# Patient Record
Sex: FEMALE | Race: WHITE | NOT HISPANIC OR LATINO | Employment: UNEMPLOYED | URBAN - METROPOLITAN AREA
[De-identification: names, ages, dates, MRNs, and addresses within clinical notes are randomized per-mention and may not be internally consistent; named-entity substitution may affect disease eponyms.]

---

## 2017-03-03 ENCOUNTER — TELEPHONE (OUTPATIENT)
Dept: OBSTETRICS AND GYNECOLOGY | Facility: CLINIC | Age: 27
End: 2017-03-03

## 2017-03-03 NOTE — TELEPHONE ENCOUNTER
----- Message from Magnolia Wong sent at 3/3/2017  1:10 PM CST -----  Contact: patient  Calling concerning if the hold is lifted on the insurance. Please call patient @ [phone]. Thanks, naseem

## 2017-03-08 ENCOUNTER — INITIAL PRENATAL (OUTPATIENT)
Dept: OBSTETRICS AND GYNECOLOGY | Facility: CLINIC | Age: 27
End: 2017-03-08
Payer: MEDICAID

## 2017-03-08 VITALS
BODY MASS INDEX: 28.93 KG/M2 | SYSTOLIC BLOOD PRESSURE: 114 MMHG | WEIGHT: 190.25 LBS | DIASTOLIC BLOOD PRESSURE: 74 MMHG

## 2017-03-08 DIAGNOSIS — Z98.891 HISTORY OF CESAREAN SECTION: ICD-10-CM

## 2017-03-08 DIAGNOSIS — B15.9 VIRAL HEPATITIS A WITHOUT HEPATIC COMA: ICD-10-CM

## 2017-03-08 DIAGNOSIS — Z36.3 ENCOUNTER FOR ROUTINE SCREENING FOR MALFORMATION USING ULTRASONICS: ICD-10-CM

## 2017-03-08 DIAGNOSIS — B19.10 HEPATITIS B AFFECTING PREGNANCY: ICD-10-CM

## 2017-03-08 DIAGNOSIS — B17.10 ACUTE HEPATITIS C VIRUS INFECTION WITHOUT HEPATIC COMA: ICD-10-CM

## 2017-03-08 DIAGNOSIS — O98.419 HEPATITIS B AFFECTING PREGNANCY: ICD-10-CM

## 2017-03-08 DIAGNOSIS — Z32.00 ENCOUNTER FOR CONFIRMATION OF PREGNANCY TEST RESULT WITH PHYSICAL EXAMINATION: Primary | ICD-10-CM

## 2017-03-08 PROCEDURE — 87086 URINE CULTURE/COLONY COUNT: CPT

## 2017-03-08 PROCEDURE — 99204 OFFICE O/P NEW MOD 45 MIN: CPT | Mod: TH,S$PBB,, | Performed by: ADVANCED PRACTICE MIDWIFE

## 2017-03-08 PROCEDURE — 87591 N.GONORRHOEAE DNA AMP PROB: CPT

## 2017-03-08 PROCEDURE — 99999 PR PBB SHADOW E&M-EST. PATIENT-LVL III: CPT | Mod: PBBFAC,,, | Performed by: ADVANCED PRACTICE MIDWIFE

## 2017-03-08 PROCEDURE — 99213 OFFICE O/P EST LOW 20 MIN: CPT | Mod: PBBFAC | Performed by: ADVANCED PRACTICE MIDWIFE

## 2017-03-08 NOTE — MR AVS SNAPSHOT
[facility]  [address]  [place] [address]  Phone: [phone]  Fax: [phone]                  Radha Vanegas   3/8/2017 3:15 PM   Initial Prenatal    Description:  Female : 1990   Provider:  Rita Pagan CNM   Department:  [facility]           Reason for Visit     Initial Prenatal Visit           Diagnoses this Visit        Comments    Encounter for confirmation of pregnancy test result with physical examination    -  Primary            To Do List           Future Appointments        Provider Department Dept Phone    3/14/2017 3:00 PM ULTRASOUND, OB-GYN [facility]/ -[phone]    3/14/2017 3:30 PM Rita Pagan CNM [facility]/ -[phone]    3/14/2017 4:10 PM LABORATORY, [address] [facility] [phone]      Goals (5 Years of Data)     None      [facility]     [facility] Nurse Care Line -  Assistance  Registered nurses in the [facility] provide clinical advisement, health education, appointment booking, and other advisory services.  Call for this free service at [phone].             Medications           Message regarding Medications     Verify the changes and/or additions to your medication regime listed below are the same as discussed with your clinician today.  If any of these changes or additions are incorrect, please notify your healthcare provider.             Verify that the below list of medications is an accurate representation of the medications you are currently taking.  If none reported, the list may be blank. If incorrect, please contact your healthcare provider. Carry this list with you in case of emergency.           Current Medications     lamotrigine (LAMICTAL) 25 MG tablet Take 1 tablet (25 mg total) by mouth once daily.    norgestimate-ethinyl estradiol (ORTHO TRI-CYCLEN,TRI-SPRINTEC) 0.18/0.215/0.25 mg-35 mcg (28) tablet Take 1 tablet by mouth once daily.     paroxetine (PAXIL) 20 MG tablet Take 1 tablet (20 mg total) by mouth every morning.    quetiapine (SEROQUEL) 50 MG tablet Take 1 tablet (50 mg total) by mouth every evening.    tramadol (ULTRAM) 50 mg tablet Take 1 tablet (50 mg total) by mouth every 6 (six) hours as needed for Pain.           Clinical Reference Information           Prenatal Vitals     Enc. Date GA Prenatal Vitals Prenatal Pulse Pain Level Urine Albumin/Glucose Edema Presentation Dilation/Effacement/Station    3/8/17  114/74 / 86.3 kg (190 lb 4.1 oz)  / 131 / Present  0          Your Vitals Were     BP Weight Last Period BMI       114/74 86.3 kg (190 lb 4.1 oz) 10/11/2016 (Approximate) 28.93 kg/m2       Allergies as of 3/8/2017     No Known Allergies      Immunizations Administered on Date of Encounter - 3/8/2017     None      Orders Placed During Today's Visit      Normal Orders This Visit    C. trachomatis/N. gonorrhoeae by AMP DNA Cervix     Toxicology screen, urine     Urine culture     Future Labs/Procedures Expected by Expires    CBC auto differential  3/8/2017 3/8/2018    Hepatitis B surface antigen  3/8/2017 3/8/2018    HIV-1 and HIV-2 antibodies  3/8/2017 3/8/2018    RPR  3/8/2017 3/8/2018    Rubella antibody, IgG  3/8/2017 3/8/2018    Type & Screen - Ob Profile  3/8/2017 3/8/2018      Language Assistance Services     ATTENTION: Language assistance services are available, free of charge. Please call [phone].      ATENCIÓN: Si habla español, tiene a montano disposición servicios gratuitos de asistencia lingüística. Llame al [phone].     CHÚ Ý: N?u b?n nói Ti?ng Vi?t, có các d?ch v? h? tr? ngôn ng? mi?n phí dành cho b?n. G?i s? [phone].         [facility] complies with applicable Federal civil rights laws and does not discriminate on the basis of race, color, national origin, age, disability, or sex.

## 2017-03-09 PROBLEM — O98.419 HEPATITIS B AFFECTING PREGNANCY: Status: ACTIVE | Noted: 2017-03-09

## 2017-03-09 PROBLEM — B19.10 HEPATITIS B AFFECTING PREGNANCY: Status: ACTIVE | Noted: 2017-03-09

## 2017-03-09 PROBLEM — B19.20 HEPATITIS C: Status: ACTIVE | Noted: 2017-03-09

## 2017-03-09 PROBLEM — Z98.891 HISTORY OF CESAREAN SECTION: Status: ACTIVE | Noted: 2017-03-09

## 2017-03-09 PROBLEM — B15.9 VIRAL HEPATITIS A WITHOUT HEPATIC COMA: Status: ACTIVE | Noted: 2017-03-09

## 2017-03-09 NOTE — PROGRESS NOTES
Subjective:      Radha Vanegas is being seen today for her first obstetrical visit.  This is not a planned pregnancy. She is at Unknown gestation. Her obstetrical history is significant for late to care, hx drug abuse, Hep A, B, and C.   Pregnancy history fully reviewed.    Menstrual History:  OB History      Para Term  AB TAB SAB Ectopic Multiple Living    4 2 2  1 1   0 1           Patient's last menstrual period was 10/11/2016 (approximate).       The following portions of the patient's history were reviewed and updated as appropriate: allergies, current medications, past family history, past medical history, past social history, past surgical history and problem list.    Review of Systems  A comprehensive review of systems was negative.      Objective:         General Appearance:    Alert, cooperative, no distress, appears stated age   Head:    Normocephalic, without obvious abnormality, atraumatic       Back:     Symmetric, no curvature, ROM normal, no CVA tenderness   Lungs:      respirations unlabored           Abdomen:     Soft, non-tender, bowel sounds active all four quadrants,     no masses, no organomegaly, gravid   Genitalia:   Vulva normal in appearance, no rash or lesions noted. Vagina moist, with adequate ruggae, pink in appearance, no tenderness or lesions noted, no discharge noted. Cervix closed, pink, without bleeding or discharge. Specimens collected.  Bimanual exam: no CMT, uterus palpated approx 30 wk size, ovaries not palpated. Good vaginal tone noted. Rectal exam deferred.             Skin:   Skin color, texture, turgor normal, no rashes or lesions            Assessment:      Pregnancy at 31 and 0/7 weeks      Plan:      Initial labs drawn today  Problem list reviewed and updated.  Role of ultrasound in pregnancy discussed; fetal survey: ordered.  Amniocentesis discussed: not indicated.  Follow up in 2 weeks.  75% of 20 min visit spent on counseling and coordination of  care.

## 2017-03-10 LAB
BACTERIA UR CULT: NO GROWTH
C TRACH DNA SPEC QL NAA+PROBE: NEGATIVE
N GONORRHOEA DNA SPEC QL NAA+PROBE: NEGATIVE

## 2017-03-14 ENCOUNTER — PROCEDURE VISIT (OUTPATIENT)
Dept: OBSTETRICS AND GYNECOLOGY | Facility: CLINIC | Age: 27
End: 2017-03-14
Payer: MEDICAID

## 2017-03-14 ENCOUNTER — ROUTINE PRENATAL (OUTPATIENT)
Dept: OBSTETRICS AND GYNECOLOGY | Facility: CLINIC | Age: 27
End: 2017-03-14
Payer: MEDICAID

## 2017-03-14 ENCOUNTER — LAB VISIT (OUTPATIENT)
Dept: LAB | Facility: HOSPITAL | Age: 27
End: 2017-03-14
Attending: ADVANCED PRACTICE MIDWIFE
Payer: MEDICAID

## 2017-03-14 VITALS — DIASTOLIC BLOOD PRESSURE: 60 MMHG | BODY MASS INDEX: 28.96 KG/M2 | WEIGHT: 190.5 LBS | SYSTOLIC BLOOD PRESSURE: 118 MMHG

## 2017-03-14 DIAGNOSIS — F19.11 H/O DRUG ABUSE: ICD-10-CM

## 2017-03-14 DIAGNOSIS — O98.419 HEPATITIS B AFFECTING PREGNANCY: ICD-10-CM

## 2017-03-14 DIAGNOSIS — B19.10 HEPATITIS B AFFECTING PREGNANCY: ICD-10-CM

## 2017-03-14 DIAGNOSIS — Z32.00 ENCOUNTER FOR CONFIRMATION OF PREGNANCY TEST RESULT WITH PHYSICAL EXAMINATION: ICD-10-CM

## 2017-03-14 DIAGNOSIS — Z36.3 ENCOUNTER FOR ROUTINE SCREENING FOR MALFORMATION USING ULTRASONICS: ICD-10-CM

## 2017-03-14 DIAGNOSIS — B17.10 ACUTE HEPATITIS C VIRUS INFECTION WITHOUT HEPATIC COMA: ICD-10-CM

## 2017-03-14 DIAGNOSIS — Z34.93 ENCOUNTER FOR SUPERVISION OF NORMAL PREGNANCY IN THIRD TRIMESTER, UNSPECIFIED GRAVIDITY: Primary | ICD-10-CM

## 2017-03-14 LAB
ABO + RH BLD: NORMAL
BASOPHILS # BLD AUTO: 0.01 K/UL
BASOPHILS NFR BLD: 0.1 %
BLD GP AB SCN CELLS X3 SERPL QL: NORMAL
DIFFERENTIAL METHOD: ABNORMAL
EOSINOPHIL # BLD AUTO: 0.1 K/UL
EOSINOPHIL NFR BLD: 0.7 %
ERYTHROCYTE [DISTWIDTH] IN BLOOD BY AUTOMATED COUNT: 13.4 %
HCT VFR BLD AUTO: 35.6 %
HGB BLD-MCNC: 12.2 G/DL
LYMPHOCYTES # BLD AUTO: 2 K/UL
LYMPHOCYTES NFR BLD: 22.4 %
MCH RBC QN AUTO: 31.9 PG
MCHC RBC AUTO-ENTMCNC: 34.3 %
MCV RBC AUTO: 93 FL
MONOCYTES # BLD AUTO: 0.6 K/UL
MONOCYTES NFR BLD: 6.3 %
NEUTROPHILS # BLD AUTO: 6.1 K/UL
NEUTROPHILS NFR BLD: 70 %
PLATELET # BLD AUTO: 245 K/UL
PMV BLD AUTO: 10.9 FL
RBC # BLD AUTO: 3.82 M/UL
WBC # BLD AUTO: 8.76 K/UL

## 2017-03-14 PROCEDURE — 99212 OFFICE O/P EST SF 10 MIN: CPT | Mod: PBBFAC,25 | Performed by: ADVANCED PRACTICE MIDWIFE

## 2017-03-14 PROCEDURE — 76819 FETAL BIOPHYS PROFIL W/O NST: CPT | Mod: PBBFAC | Performed by: OBSTETRICS & GYNECOLOGY

## 2017-03-14 PROCEDURE — 76819 FETAL BIOPHYS PROFIL W/O NST: CPT | Mod: 26,S$PBB,, | Performed by: OBSTETRICS & GYNECOLOGY

## 2017-03-14 PROCEDURE — 99999 PR PBB SHADOW E&M-EST. PATIENT-LVL II: CPT | Mod: PBBFAC,,, | Performed by: ADVANCED PRACTICE MIDWIFE

## 2017-03-14 PROCEDURE — 76805 OB US >/= 14 WKS SNGL FETUS: CPT | Mod: PBBFAC | Performed by: OBSTETRICS & GYNECOLOGY

## 2017-03-14 PROCEDURE — 99213 OFFICE O/P EST LOW 20 MIN: CPT | Mod: TH,S$PBB,, | Performed by: ADVANCED PRACTICE MIDWIFE

## 2017-03-14 PROCEDURE — 76805 OB US >/= 14 WKS SNGL FETUS: CPT | Mod: 26,S$PBB,, | Performed by: OBSTETRICS & GYNECOLOGY

## 2017-03-14 NOTE — PROGRESS NOTES
Pt states doing well. U/s today shows pt to be 32w2d. Appt scheduled to establish care with Dr Galloway for Hep A, B, and C.  New ob/28 week labs today. PTL talk. bg

## 2017-03-14 NOTE — MR AVS SNAPSHOT
[facility]  [address]  [place] [address]  Phone: [phone]  Fax: [phone]                  Radha Vanegas   3/14/2017 3:30 PM   Routine Prenatal    Description:  Female : 1990   Provider:  Rita Pagan CNM   Department:  [facility]           Reason for Visit     Routine Prenatal Visit                To Do List           Future Appointments        Provider Department Dept Phone    3/14/2017 3:30 PM Rita Pagan CNM [facility]/ -[phone]    3/14/2017 4:10 PM LABORATORY, [address] [facility] [phone]    3/27/2017 1:40 PM Zeb Galloway MD [facility] [phone]    3/28/2017 2:45 PM [facility] [phone]      Goals (5 Years of Data)     None      [facility]     [facility] Care [facility] -  Assistance  Registered nurses in the [facility] provide clinical advisement, health education, appointment booking, and other advisory services.  Call for this free service at [phone].             Medications           Message regarding Medications     Verify the changes and/or additions to your medication regime listed below are the same as discussed with your clinician today.  If any of these changes or additions are incorrect, please notify your healthcare provider.             Verify that the below list of medications is an accurate representation of the medications you are currently taking.  If none reported, the list may be blank. If incorrect, please contact your healthcare provider. Carry this list with you in case of emergency.           Current Medications     lamotrigine (LAMICTAL) 25 MG tablet Take 1 tablet (25 mg total) by mouth once daily.    norgestimate-ethinyl estradiol (ORTHO TRI-CYCLEN,TRI-SPRINTEC) 0.18/0.215/0.25 mg-35 mcg (28) tablet Take 1 tablet by mouth once daily.    paroxetine (PAXIL) 20 MG tablet Take 1 tablet (20 mg  total) by mouth every morning.    quetiapine (SEROQUEL) 50 MG tablet Take 1 tablet (50 mg total) by mouth every evening.    tramadol (ULTRAM) 50 mg tablet Take 1 tablet (50 mg total) by mouth every 6 (six) hours as needed for Pain.           Clinical Reference Information           Prenatal Vitals     Enc. Date GA Prenatal Vitals Prenatal Pulse Pain Level Urine Albumin/Glucose Edema Presentation Dilation/Effacement/Station    3/14/17 32w2d 118/60 / 86.4 kg (190 lb 7.6 oz)  / 152  0        3/8/17 31w3d 114/74 / 86.3 kg (190 lb 4.1 oz)  / 131 / Present  0 Negative / Negative None / None        Your Vitals Were     BP Weight Last Period BMI       118/60 86.4 kg (190 lb 7.6 oz) 10/11/2016 (Approximate) 28.96 kg/m2       Allergies as of 3/14/2017     No Known Allergies      Immunizations Administered on Date of Encounter - 3/14/2017     None      Language Assistance Services     ATTENTION: Language assistance services are available, free of charge. Please call [phone].      ATENCIÓN: Si habla jus, tiene a montano disposición servicios gratuitos de asistencia lingüística. Llame al [phone].     SEDRICK Ý: N?u b?n nói Ti?ng Vi?t, có các d?ch v? h? tr? ngôn ng? mi?n phí dành cho b?n. G?i s? [phone].         [facility] complies with applicable Federal civil rights laws and does not discriminate on the basis of race, color, national origin, age, disability, or sex.

## 2017-03-15 LAB
HAV IGM SERPL QL IA: NEGATIVE
HBSAG CONFIRMATION: POSITIVE
HBV CORE IGM SERPL QL IA: POSITIVE
HBV SURFACE AG SERPL QL IA: POSITIVE
HBV SURFACE AG SERPL QL IA: POSITIVE
HCV AB SERPL QL IA: POSITIVE
HIV 1+2 AB+HIV1 P24 AG SERPL QL IA: NEGATIVE
RPR SER QL: NORMAL
RUBV IGG SER-ACNC: 12.2 IU/ML
RUBV IGG SER-IMP: REACTIVE

## 2017-03-27 ENCOUNTER — PATIENT MESSAGE (OUTPATIENT)
Dept: GASTROENTEROLOGY | Facility: CLINIC | Age: 27
End: 2017-03-27

## 2017-03-27 ENCOUNTER — INITIAL CONSULT (OUTPATIENT)
Dept: GASTROENTEROLOGY | Facility: CLINIC | Age: 27
End: 2017-03-27
Payer: MEDICAID

## 2017-03-27 ENCOUNTER — LAB VISIT (OUTPATIENT)
Dept: LAB | Facility: HOSPITAL | Age: 27
End: 2017-03-27
Attending: INTERNAL MEDICINE
Payer: MEDICAID

## 2017-03-27 VITALS
HEART RATE: 79 BPM | DIASTOLIC BLOOD PRESSURE: 78 MMHG | BODY MASS INDEX: 29.37 KG/M2 | WEIGHT: 193.81 LBS | HEIGHT: 68 IN | SYSTOLIC BLOOD PRESSURE: 128 MMHG

## 2017-03-27 DIAGNOSIS — O98.419 HEPATITIS B SURFACE ANTIGEN POSITIVE, CURRENTLY PREGNANT: ICD-10-CM

## 2017-03-27 DIAGNOSIS — B18.1 HEPATITIS B SURFACE ANTIGEN POSITIVE, CURRENTLY PREGNANT: ICD-10-CM

## 2017-03-27 DIAGNOSIS — R76.8 HEPATITIS B CORE ANTIBODY POSITIVE: ICD-10-CM

## 2017-03-27 DIAGNOSIS — R76.8 HEPATITIS C ANTIBODY TEST POSITIVE: ICD-10-CM

## 2017-03-27 DIAGNOSIS — O98.413 HEPATITIS B AFFECTING PREGNANCY IN THIRD TRIMESTER, ANTEPARTUM: Primary | ICD-10-CM

## 2017-03-27 DIAGNOSIS — B19.10 HEPATITIS B AFFECTING PREGNANCY IN THIRD TRIMESTER, ANTEPARTUM: Primary | ICD-10-CM

## 2017-03-27 DIAGNOSIS — O98.413 HEPATITIS B AFFECTING PREGNANCY IN THIRD TRIMESTER, ANTEPARTUM: ICD-10-CM

## 2017-03-27 DIAGNOSIS — B19.10 HEPATITIS B AFFECTING PREGNANCY IN THIRD TRIMESTER, ANTEPARTUM: ICD-10-CM

## 2017-03-27 PROCEDURE — 87522 HEPATITIS C REVRS TRNSCRPJ: CPT

## 2017-03-27 PROCEDURE — 99204 OFFICE O/P NEW MOD 45 MIN: CPT | Mod: S$PBB,,, | Performed by: INTERNAL MEDICINE

## 2017-03-27 PROCEDURE — 87340 HEPATITIS B SURFACE AG IA: CPT

## 2017-03-27 PROCEDURE — 86706 HEP B SURFACE ANTIBODY: CPT

## 2017-03-27 PROCEDURE — 86704 HEP B CORE ANTIBODY TOTAL: CPT

## 2017-03-27 PROCEDURE — 87517 HEPATITIS B DNA QUANT: CPT

## 2017-03-27 PROCEDURE — 87902 NFCT AGT GNTYP ALYS HEP C: CPT

## 2017-03-27 PROCEDURE — 99999 PR PBB SHADOW E&M-EST. PATIENT-LVL III: CPT | Mod: PBBFAC,,, | Performed by: INTERNAL MEDICINE

## 2017-03-27 PROCEDURE — 36415 COLL VENOUS BLD VENIPUNCTURE: CPT

## 2017-03-27 NOTE — MR AVS SNAPSHOT
[facility]  [address]  [place] [address]  Phone: [phone]  Fax: [phone]                  Radha Vanegas   3/27/2017 1:40 PM   Initial consult    Description:  Female : 1990   Provider:  Zeb Galloway MD   [facility]           Reason for Visit     Hepatitis           Diagnoses this Visit        Comments    Hepatitis B affecting pregnancy in third trimester, antepartum    -  Primary     Hepatitis B core antibody positive         Hepatitis C antibody test positive         Hepatitis B surface antigen positive, currently pregnant                To Do List           Future Appointments        Provider Department Dept Phone    3/27/2017 4:45 PM LAB, SAME DAY [address] [facility] [phone]    2017 11:00 AM NAVEEN [place] [facility] [phone]    4/10/2017 9:40 AM MARGO Najera [facility] [phone]      Goals (5 Years of Data)     None      Follow-Up and Disposition     Return in about 3 weeks (around 2017), or if symptoms worsen or fail to improve, for To check labs and determine prophylaxis for Hep B.      [facility]     [facility] Care [facility] -  Assistance  Registered nurses in the [facility] provide clinical advisement, health education, appointment booking, and other advisory services.  Call for this free service at [phone].             Medications           Message regarding Medications     Verify the changes and/or additions to your medication regime listed below are the same as discussed with your clinician today.  If any of these changes or additions are incorrect, please notify your healthcare provider.             Verify that the below list of medications is an accurate representation of the medications you are currently taking.  If none reported, the list may be blank. If incorrect, please contact your healthcare  "provider. Carry this list with you in case of emergency.           Current Medications     lamotrigine (LAMICTAL) 25 MG tablet Take 1 tablet (25 mg total) by mouth once daily.    norgestimate-ethinyl estradiol (ORTHO TRI-CYCLEN,TRI-SPRINTEC) 0.18/0.215/0.25 mg-35 mcg (28) tablet Take 1 tablet by mouth once daily.    paroxetine (PAXIL) 20 MG tablet Take 1 tablet (20 mg total) by mouth every morning.    quetiapine (SEROQUEL) 50 MG tablet Take 1 tablet (50 mg total) by mouth every evening.    tramadol (ULTRAM) 50 mg tablet Take 1 tablet (50 mg total) by mouth every 6 (six) hours as needed for Pain.           Clinical Reference Information           Prenatal Vitals     Enc. Date GA Prenatal Vitals Prenatal Pulse Pain Level Urine Albumin/Glucose Edema Presentation Dilation/Effacement/Station    3/27/17 34w1d 128/78 / 87.9 kg (193 lb 12.6 oz)  79         3/14/17 32w2d 118/60 / 86.4 kg (190 lb 7.6 oz)  / 152  0        3/8/17 31w3d 114/74 / 86.3 kg (190 lb 4.1 oz)  / 131 / Present  0 Negative / Negative None / None        Your Vitals Were     BP Pulse Height Weight Last Period BMI    128/78 79 5' 8" (1.727 m) 87.9 kg (193 lb 12.6 oz) 10/11/2016 (Approximate) 29.46 kg/m2      Blood Pressure          Most Recent Value    BP  128/78      Allergies as of 3/27/2017     No Known Allergies      Immunizations Administered on Date of Encounter - 3/27/2017     None      Orders Placed During Today's Visit     Future Labs/Procedures Expected by Expires    Hepatitis B core antibody, total  3/27/2017 5/26/2018    Hepatitis B DNA, Quant  3/27/2017 5/26/2018    Hepatitis B surface antibody  3/27/2017 5/26/2018    Hepatitis B surface antigen  3/27/2017 5/26/2018    Hepatitis C genotype  3/27/2017 5/26/2018    Hepatitis C RNA, quantitative, PCR  3/27/2017 5/26/2018      Instructions      Treating Hepatitis B (HBV)     Keep appointments with your healthcare provider.    Acute hepatitis B symptoms usually go away. You can be treated for " hepatitis B. But there is very rarely a cure. Follow your healthcare providers instructions for follow-up to be sure the virus is gone from your blood. If you develop long-term (chronic) hepatitis B, you can help your body fight it. Your provider may tell you to follow these guidelines:  Avoid most over-the-counter pain medicines  Dont take aspirin, ibuprofen, or naproxen. If taking acetaminophen, dont take more than 2 grams a day. Always ask your provider before taking any medicines. Some can do more harm to your liver.   Don't drink alcohol  It may do more harm to your liver.  Eat a balanced diet  A diet low in fat, high in fiber, and full of fresh fruits and vegetables helps you stay healthy.  Keep your appointments  Going to checkups with your provider helps keep a close watch on your liver. If you are pregnant, your provider will check for hepatitis B. He or she will discuss treatment to reduce the chance you will pass the disease to your baby.  Get treatment if your provider tells you to  Usually, no medicine is used to treat an acute infection. But if you have chronic hepatitis B, your healthcare provider may give you medicine to treat it. The medicines are called antiviral drugs. Your provider will watch your infection to see if it getting worse. He or she will also watch to see if you have any liver damage. Sometimes the medicines must be taken for years.  Use a condom when having sex  This is to avoid passing the disease on to someone else. Also let your partner know you have hepatitis. HBV is a sexually transmitted disease. This means it can be passed on to other people through sex. It can also be transmitted through drug use by sharing needles, straws, and other drug equipment. The disease can also be prevented by getting a vaccine.      Be informed  Stay in touch with your provider. Make sure anyone you have close contact with sees a provider for testing, vaccination, and counseling. Help tell others  about how HBV is spread. The more people know about hepatitis B, the easier it is to prevent its spread.   Date Last Reviewed: 7/1/2016 © 2000-2016 [employer]. [address] [place] [address], [place] [address]. All rights reserved. This information is not intended as a substitute for professional medical care. Always follow your healthcare professional's instructions.        Hepatitis Type B    You have been diagnosed with hepatitis B, also called HBV. Hepatitis is an inflammation of the liver. In your case, it is from an infection with the hepatitis B virus.  Causes  The most common causes of hepatitis are viruses. Alcohol and drug abuse, chemical toxins, and autoimmune disorders can also cause hepatitis.  When a virus causes hepatitis, it is called viral hepatitis. The hepatitis viruses A, B, and C commonly cause viral hepatitis. Other viral infections can also cause hepatitis, such as the viruses that cause mononucleosis and chicken pox.  What all the hepatic (liver) viruses have in common is that once they are transmitted to you, they infect the liver and then cause inflammation (hepatitis). The different viruses are spread in different ways, but all of them can affect your health over a long time. Possible complications include cirrhosis, liver cancer, and liver failure.  HBV is the most common cause of acute (short-term) viral hepatitis. HBV is commonly spread through contact with the body fluids of an infected person, exposure to contaminated blood or needles, unprotected sex with an infected person, or from an infected mother to baby.  Avoiding these common routes of infection is the best way to prevent the spread of HBV.  Symptoms  Many people with HBV have either mild symptoms or no symptoms when they are first infected. Often this is also the case for many years afterwards. However, hepatitis can cause damage to your liver. It can become chronic in some people. Symptoms usually last 1 to 3 months  in the early stages. Symptoms can include:  · Tiredness, fatigue, or weakness  · Low-grade fever  · Loss of appetite  · Upset stomach, nausea, or vomiting  · Abdominal pain  · Dark-colored urine  · Light-colored or pale stool  · Yellow color of the skin or eyes (jaundice)  Home care  · A diet low in saturated fats and high in fruits and vegetables is best for you and your liver. Have small, frequent meals if you experience nausea.  · If you are having symptoms of hepatitis, you may fatigue easily. Get lots of rest. Don't exert yourself too much.  · Acetaminophen and anti-inflammatory drugs such as ibuprofen and naproxen can be toxic to the liver in high doses, with prolonged use, or in the presence of existing liver damage.  ¨ If you have hepatitis, you should not take these medicines until you talk about them with your healthcare provider.  ¨ If you have only mild or no liver damage from chronic hepatitis, you may take acetaminophen in low doses (2 grams per 24 hours). Do not take anti-inflammatory medicines. Never take acetaminophen with alcohol, since this increases the risk of liver damage.  · Alcohol stresses the liver. People with hepatitis should avoid it.  Preventing the spread of hepatitis  · This virus is present in the blood and body fluids. It can be spread through sexual contact. Inform your partner and use condoms when you have sex until the virus has been eliminated from your system. Condoms may lower the risk of spreading the virus, but they are not a guarantee  · Never share needles, syringes, tattoo equipment, snorting straws, razors, or toothbrushes.  · Do not attempt to donate blood.  · If you require medical or dental care, inform the staff that you have hepatitis so that extra care may be taken to avoid spread of the infection.  · If you are pregnant or plan to become pregnant, notify your physician. Hepatitis can be transmitted to the unborn baby.  · People who are exposed to you in any of the  ways described in the Causes section above should contact their healthcare provider or the local public health department as soon as possible. This is so they can be tested and receive immunization. A vaccine can be given up to 2 weeks after a person is exposed.  Follow-up care  Follow up with your healthcare provider, or as advised, to be sure that you are doing well and do not have any complications. It is possible to become a chronic carrier of HBV. If this happens, you could infect others even though you no longer feel ill. Only a blood test can identify this condition. This makes it important to have a follow-up exam.  If X-rays, a CT scan, or an ultrasound were done, they will be reviewed by a specialist. You will be notified of the results, especially if they affect treatment.  Call 911  Call emergency services right away if any of these occur:  · Trouble breathing or swallowing, wheezing  · Confusion  · Extreme drowsiness or trouble awakening  · Fainting or loss of consciousness  · Rapid heart rate  When to seek medical advice  Call your healthcare provider right away if any of these occur:  · Frequent vomiting  · Weight loss from poor appetite  · Increase in abdominal pain or swelling  · Increasing drowsiness or confusion  · Weakness or dizziness  · New or increasing yellow color of skin or eyes  · Bleeding from the gums or nose or easy bruising  Date Last Reviewed: 6/16/2015 © 2000-2016 [employer]. [address] [place] [address], [place] [address]. All rights reserved. This information is not intended as a substitute for professional medical care. Always follow your healthcare professional's instructions.      Hepatitis B affecting pregnancy in third trimester, antepartum  -     Hepatitis B surface antigen; Future; Expected date: 3/27/17  -     Hepatitis B surface antibody; Future; Expected date: 3/27/17  -     Hepatitis B core antibody, total; Future; Expected date: 3/27/17  -     Hepatitis B DNA,  Quant; Future; Expected date: 3/27/17    Hepatitis B core antibody positive    Hepatitis C antibody test positive  -     Hepatitis C RNA, quantitative, PCR; Future; Expected date: 3/27/17  -     Hepatitis C genotype; Future; Expected date: 3/27/17    Hepatitis B surface antigen positive, currently pregnant  -     Hepatitis B DNA, Quant; Future; Expected date: 3/27/17    Return in about 3 weeks (around 4/17/2017), or if symptoms worsen or fail to improve, for To check labs and determine prophylaxis for Hep B.    Thanks for trusting us with your healthcare needs and using [facility]. If you want to ask us a question, you can do so by replying to this message or by calling [phone].    Sincerely,    Zeb Galloway M.D.      To rate your experience with Dr. Galloway please click on the link below:    [email or web address]               Language Assistance Services     ATTENTION: Language assistance services are available, free of charge. Please call [phone].      ATENCIÓN: Si habla español, tiene a montano disposición servicios gratuitos de asistencia lingüística. Llame al [phone].     CHÚ Ý: N?u b?n nói Ti?ng Vi?t, có các d?ch v? h? tr? ngôn ng? mi?n phí dành cho b?n. G?i s? [phone].         [facility] complies with applicable Federal civil rights laws and does not discriminate on the basis of race, color, national origin, age, disability, or sex.

## 2017-03-27 NOTE — LETTER
March 27, 2017      Rita Pagan, [facility]  [address] [facility] [address]           [facility]  [address] [place] [id number]  Phone: [phone]  Fax: [phone]          Patient: Radha Vanegas   MR Number: [id number]   YOB: 1990   Date of Visit: 3/27/2017       Dear Rita Pagan:    Thank you for referring Radha Vanegas to me for evaluation. Attached you will find relevant portions of my assessment and plan of care.    If you have questions, please do not hesitate to call me. I look forward to following Radha Vanegas along with you.    Sincerely,    Zeb Galloway MD    Enclosure  CC:  No Recipients    If you would like to receive this communication electronically, please contact [email or web address] or [phone] to request more information on [employer] access.    For providers and/or their staff who would like to refer a patient to [facility], please contact us through our one-stop-shop provider referral line, [facility], at [phone].    If you feel you have received this communication in error or would no longer like to receive these types of communications, please e-mail [email or web address]

## 2017-03-27 NOTE — PROGRESS NOTES
Subjective:       Patient ID: Radha Vanegas is a 26 y.o. female.    Chief Complaint: Hepatitis    HPI Comments: Hepatitis C: Patient complains of a positive Hepatitis C antibody test. She also tested positive for Hepatitis B surface Antigen and Core antibody. Patient tested positive 4 months ago. Test was performed as part of an evaluation of abnormal liver function test:(elevated ALT, elevated AST, elevated total bilirubin) and jaundice. Hepatitis C risk factors present are IV drug abuse. Patient denies history of blood transfusion, renal dialysis. Patient has had other studies performed. Results: see labs. Patient has not had prior treatment for Hepatitis C. Patient does not have a past history of liver disease. Patient does not have a family history of liver disease.  Patient's current symptoms include fatigue. Patient denies abdominal pain, fever, nausea and weight loss. Symptoms have been present for approximately several months. The symptoms are stable.    Radha was using IV drugs until last October. She ended up in assisted for violating her probation and while in retirement she got really sick, developing itching/pruritus, nausea, vomiting and jaundice. She was diagnosed with acute hepatitis. She is also 34 weeks pregnant.     Lab Results       Component                Value               Date                       ALT                      20                  12/06/2013                 AST                      27                  12/06/2013                 ALKPHOS                  69                  12/06/2013                 BILITOT                  1.1 (H)             12/06/2013              BMP  Lab Results       Component                Value               Date                       NA                       137                 12/06/2013                 K                        3.1 (L)             12/06/2013                 CL                       105                 12/06/2013                 CO2                       19 (L)              2013                 BUN                      17                  2013                 CREATININE               0.7                 2013                 CALCIUM                  9.2                 2013                 ANIONGAP                 13                  2013                 ESTGFRAFRICA             >60                 2013                 EGFRNONAA                >60                 2013            Lab Results       Component                Value               Date                       WBC                      8.76                2017                 HGB                      12.2                2017                 HCT                      35.6 (L)            2017                 MCV                      93                  2017                 PLT                      245                 2017                  Past Medical History:   Diagnosis Date    ADD (attention deficit disorder)     Anxiety     Depression     Hepatitis     Hep A,B,C    Hypertension     Migraine headache     Panic attack      Past Surgical History:   Procedure Laterality Date    APPENDECTOMY       SECTION      x1     Current Outpatient Prescriptions on File Prior to Visit   Medication Sig Dispense Refill    lamotrigine (LAMICTAL) 25 MG tablet Take 1 tablet (25 mg total) by mouth once daily. 30 tablet 3    norgestimate-ethinyl estradiol (ORTHO TRI-CYCLEN,TRI-SPRINTEC) 0.18/0.215/0.25 mg-35 mcg (28) tablet Take 1 tablet by mouth once daily. 28 tablet 11    paroxetine (PAXIL) 20 MG tablet Take 1 tablet (20 mg total) by mouth every morning. 30 tablet 2    quetiapine (SEROQUEL) 50 MG tablet Take 1 tablet (50 mg total) by mouth every evening. 30 tablet 2    tramadol (ULTRAM) 50 mg tablet Take 1 tablet (50 mg total) by mouth every 6 (six) hours as needed for Pain. 15 tablet 0     No current facility-administered  medications on file prior to visit.      Review of patient's allergies indicates:  No Known Allergies    Social History     Social History    Marital status: Single     Spouse name: N/A    Number of children: N/A    Years of education: N/A     Occupational History    Not on file.     Social History Main Topics    Smoking status: Former Smoker    Smokeless tobacco: Never Used    Alcohol use No    Drug use: No    Sexual activity: Yes     Partners: Male     Other Topics Concern    Not on file     Social History Narrative    No narrative on file     Family History   Problem Relation Age of Onset    Hypertension Mother     Breast cancer Neg Hx     Ovarian cancer Neg Hx     Deep vein thrombosis Neg Hx     Stomach cancer Neg Hx     Liver disease Neg Hx     Colon cancer Neg Hx        Review of Systems   Constitutional: Negative for activity change, chills, diaphoresis, fatigue and fever.   HENT: Negative for ear pain, facial swelling, nosebleeds, rhinorrhea, sneezing, sore throat and trouble swallowing.    Eyes: Negative for photophobia, pain and visual disturbance.   Respiratory: Negative for apnea, cough, chest tightness, shortness of breath and wheezing.    Gastrointestinal: Negative for abdominal pain, blood in stool, constipation, nausea, rectal pain and vomiting.   Genitourinary: Negative for decreased urine volume, difficulty urinating, dysuria, flank pain and hematuria.   Musculoskeletal: Negative for arthralgias, back pain, gait problem, neck pain and neck stiffness.   Skin: Negative for pallor and rash.   Neurological: Negative for seizures, syncope, speech difficulty, weakness and headaches.   Hematological: Negative for adenopathy. Does not bruise/bleed easily.   Psychiatric/Behavioral: Negative for behavioral problems, confusion and hallucinations.       Objective:      Physical Exam   Constitutional: She is oriented to person, place, and time. She appears well-developed and well-nourished.    HENT:   Head: Normocephalic and atraumatic.   Eyes: EOM are normal. Pupils are equal, round, and reactive to light.   Neck: Normal range of motion. Neck supple. No thyromegaly present.   Cardiovascular: Normal rate, regular rhythm, normal heart sounds and intact distal pulses.    No murmur heard.  Pulmonary/Chest: Effort normal and breath sounds normal. No respiratory distress. She has no wheezes. She has no rales.   Abdominal: Soft. Bowel sounds are normal. She exhibits distension. She exhibits no mass. There is no tenderness.   34 weeks gestation abdomen   Musculoskeletal: Normal range of motion. She exhibits no edema or tenderness.   Lymphadenopathy:     She has no cervical adenopathy.   Neurological: She is alert and oriented to person, place, and time. She has normal reflexes. No cranial nerve deficit.   Skin: Skin is warm and dry. No erythema.   Psychiatric: She has a normal mood and affect. Her behavior is normal.   Vitals reviewed.      Assessment:       1. Hepatitis B affecting pregnancy in third trimester, antepartum    2. Hepatitis B core antibody positive    3. Hepatitis C antibody test positive    4. Hepatitis B surface antigen positive, currently pregnant        Plan:         Hepatitis B affecting pregnancy in third trimester, antepartum  -     Hepatitis B surface antigen; Future; Expected date: 3/27/17  -     Hepatitis B surface antibody; Future; Expected date: 3/27/17  -     Hepatitis B core antibody, total; Future; Expected date: 3/27/17  -     Hepatitis B DNA, Quant; Future; Expected date: 3/27/17    Hepatitis B core antibody positive    Hepatitis C antibody test positive  -     Hepatitis C RNA, quantitative, PCR; Future; Expected date: 3/27/17  -     Hepatitis C genotype; Future; Expected date: 3/27/17    Hepatitis B surface antigen positive, currently pregnant  -     Hepatitis B DNA, Quant; Future; Expected date: 3/27/17    Return in about 3 weeks (around 4/17/2017), or if symptoms worsen or  fail to improve, for To check labs and determine prophylaxis for Hep B. We need to determine if this is a chronic infection or in the process of clearing the Hep B infection. Also need to determine if Hepatitis C is active. If chronic Hepatitis B, she will need prophylaxis prior to her baby being delivered; hepatitis C can be treated after delivery.     Thanks for letting us participate in the care of your patient.    Zeb Galloway M.D.

## 2017-03-27 NOTE — PATIENT INSTRUCTIONS
Treating Hepatitis B (HBV)     Keep appointments with your healthcare provider.    Acute hepatitis B symptoms usually go away. You can be treated for hepatitis B. But there is very rarely a cure. Follow your healthcare providers instructions for follow-up to be sure the virus is gone from your blood. If you develop long-term (chronic) hepatitis B, you can help your body fight it. Your provider may tell you to follow these guidelines:  Avoid most over-the-counter pain medicines  Dont take aspirin, ibuprofen, or naproxen. If taking acetaminophen, dont take more than 2 grams a day. Always ask your provider before taking any medicines. Some can do more harm to your liver.   Don't drink alcohol  It may do more harm to your liver.  Eat a balanced diet  A diet low in fat, high in fiber, and full of fresh fruits and vegetables helps you stay healthy.  Keep your appointments  Going to checkups with your provider helps keep a close watch on your liver. If you are pregnant, your provider will check for hepatitis B. He or she will discuss treatment to reduce the chance you will pass the disease to your baby.  Get treatment if your provider tells you to  Usually, no medicine is used to treat an acute infection. But if you have chronic hepatitis B, your healthcare provider may give you medicine to treat it. The medicines are called antiviral drugs. Your provider will watch your infection to see if it getting worse. He or she will also watch to see if you have any liver damage. Sometimes the medicines must be taken for years.  Use a condom when having sex  This is to avoid passing the disease on to someone else. Also let your partner know you have hepatitis. HBV is a sexually transmitted disease. This means it can be passed on to other people through sex. It can also be transmitted through drug use by sharing needles, straws, and other drug equipment. The disease can also be prevented by getting a vaccine.      Be  informed  Stay in touch with your provider. Make sure anyone you have close contact with sees a provider for testing, vaccination, and counseling. Help tell others about how HBV is spread. The more people know about hepatitis B, the easier it is to prevent its spread.   Date Last Reviewed: 7/1/2016  © 8545-3633 [employer] [address] [place] [address], [place] [address]. All rights reserved. This information is not intended as a substitute for professional medical care. Always follow your healthcare professional's instructions.        Hepatitis Type B    You have been diagnosed with hepatitis B, also called HBV. Hepatitis is an inflammation of the liver. In your case, it is from an infection with the hepatitis B virus.  Causes  The most common causes of hepatitis are viruses. Alcohol and drug abuse, chemical toxins, and autoimmune disorders can also cause hepatitis.  When a virus causes hepatitis, it is called viral hepatitis. The hepatitis viruses A, B, and C commonly cause viral hepatitis. Other viral infections can also cause hepatitis, such as the viruses that cause mononucleosis and chicken pox.  What all the hepatic (liver) viruses have in common is that once they are transmitted to you, they infect the liver and then cause inflammation (hepatitis). The different viruses are spread in different ways, but all of them can affect your health over a long time. Possible complications include cirrhosis, liver cancer, and liver failure.  HBV is the most common cause of acute (short-term) viral hepatitis. HBV is commonly spread through contact with the body fluids of an infected person, exposure to contaminated blood or needles, unprotected sex with an infected person, or from an infected mother to baby.  Avoiding these common routes of infection is the best way to prevent the spread of HBV.  Symptoms  Many people with HBV have either mild symptoms or no symptoms when they are first infected. Often this is  also the case for many years afterwards. However, hepatitis can cause damage to your liver. It can become chronic in some people. Symptoms usually last 1 to 3 months in the early stages. Symptoms can include:  · Tiredness, fatigue, or weakness  · Low-grade fever  · Loss of appetite  · Upset stomach, nausea, or vomiting  · Abdominal pain  · Dark-colored urine  · Light-colored or pale stool  · Yellow color of the skin or eyes (jaundice)  Home care  · A diet low in saturated fats and high in fruits and vegetables is best for you and your liver. Have small, frequent meals if you experience nausea.  · If you are having symptoms of hepatitis, you may fatigue easily. Get lots of rest. Don't exert yourself too much.  · Acetaminophen and anti-inflammatory drugs such as ibuprofen and naproxen can be toxic to the liver in high doses, with prolonged use, or in the presence of existing liver damage.  ¨ If you have hepatitis, you should not take these medicines until you talk about them with your healthcare provider.  ¨ If you have only mild or no liver damage from chronic hepatitis, you may take acetaminophen in low doses (2 grams per 24 hours). Do not take anti-inflammatory medicines. Never take acetaminophen with alcohol, since this increases the risk of liver damage.  · Alcohol stresses the liver. People with hepatitis should avoid it.  Preventing the spread of hepatitis  · This virus is present in the blood and body fluids. It can be spread through sexual contact. Inform your partner and use condoms when you have sex until the virus has been eliminated from your system. Condoms may lower the risk of spreading the virus, but they are not a guarantee  · Never share needles, syringes, tattoo equipment, snorting straws, razors, or toothbrushes.  · Do not attempt to donate blood.  · If you require medical or dental care, inform the staff that you have hepatitis so that extra care may be taken to avoid spread of the  infection.  · If you are pregnant or plan to become pregnant, notify your physician. Hepatitis can be transmitted to the unborn baby.  · People who are exposed to you in any of the ways described in the Causes section above should contact their healthcare provider or the local public health department as soon as possible. This is so they can be tested and receive immunization. A vaccine can be given up to 2 weeks after a person is exposed.  Follow-up care  Follow up with your healthcare provider, or as advised, to be sure that you are doing well and do not have any complications. It is possible to become a chronic carrier of HBV. If this happens, you could infect others even though you no longer feel ill. Only a blood test can identify this condition. This makes it important to have a follow-up exam.  If X-rays, a CT scan, or an ultrasound were done, they will be reviewed by a specialist. You will be notified of the results, especially if they affect treatment.  Call 911  Call emergency services right away if any of these occur:  · Trouble breathing or swallowing, wheezing  · Confusion  · Extreme drowsiness or trouble awakening  · Fainting or loss of consciousness  · Rapid heart rate  When to seek medical advice  Call your healthcare provider right away if any of these occur:  · Frequent vomiting  · Weight loss from poor appetite  · Increase in abdominal pain or swelling  · Increasing drowsiness or confusion  · Weakness or dizziness  · New or increasing yellow color of skin or eyes  · Bleeding from the gums or nose or easy bruising  Date Last Reviewed: 6/16/2015 © 2000-2016 [employer]. [address] [place] [address], [place] [address]. All rights reserved. This information is not intended as a substitute for professional medical care. Always follow your healthcare professional's instructions.      Hepatitis B affecting pregnancy in third trimester, antepartum  -     Hepatitis B surface antigen; Future;  Expected date: 3/27/17  -     Hepatitis B surface antibody; Future; Expected date: 3/27/17  -     Hepatitis B core antibody, total; Future; Expected date: 3/27/17  -     Hepatitis B DNA, Quant; Future; Expected date: 3/27/17    Hepatitis B core antibody positive    Hepatitis C antibody test positive  -     Hepatitis C RNA, quantitative, PCR; Future; Expected date: 3/27/17  -     Hepatitis C genotype; Future; Expected date: 3/27/17    Hepatitis B surface antigen positive, currently pregnant  -     Hepatitis B DNA, Quant; Future; Expected date: 3/27/17    Return in about 3 weeks (around 4/17/2017), or if symptoms worsen or fail to improve, for To check labs and determine prophylaxis for Hep B.    Thanks for trusting us with your healthcare needs and using [facility]. If you want to ask us a question, you can do so by replying to this message or by calling [phone].    Sincerely,    Zeb Galloway M.D.      To rate your experience with Dr. Galloway please click on the link below:    [email or web address]

## 2017-03-28 LAB
HBV CORE AB SERPL QL IA: POSITIVE
HBV SURFACE AB SER-ACNC: NEGATIVE M[IU]/ML
HBV SURFACE AG SERPL QL IA: POSITIVE

## 2017-03-30 ENCOUNTER — TELEPHONE (OUTPATIENT)
Dept: GASTROENTEROLOGY | Facility: CLINIC | Age: 27
End: 2017-03-30

## 2017-03-30 NOTE — TELEPHONE ENCOUNTER
----- Message from Ce Rajput sent at 3/30/2017 10:02 AM CDT -----  Contact: Pt   States she is rtn nurses call from yesterday and can be reached at [phone]//[email or web address]

## 2017-03-31 LAB
HBV DNA SERPL NAA+PROBE-ACNC: NORMAL LOGIU/ML
HBV DNA SERPL NAA+PROBE-LOG IU: NORMAL IU/ML
HCV GENTYP SERPL NAA+PROBE: ABNORMAL
HCV QUALITATIVE RESULT: DETECTED
HCV QUANTITATIVE LOG: 5.21 LOG (10) IU/ML
HCV RNA SPEC NAA+PROBE-ACNC: ABNORMAL IU/ML

## 2017-04-01 ENCOUNTER — TELEPHONE (OUTPATIENT)
Dept: GASTROENTEROLOGY | Facility: HOSPITAL | Age: 27
End: 2017-04-01

## 2017-04-01 DIAGNOSIS — B18.2 CHRONIC HEPATITIS C WITHOUT HEPATIC COMA: Primary | ICD-10-CM

## 2017-04-01 NOTE — TELEPHONE ENCOUNTER
I called the patient and informed of the lab results.   Hepatitis B viral DNA is not detected.     Hepatitis C is detected.     She will need repeat labs including HBV DNA before delivery.     After she delivers her baby, she will need Rx for HCV.     Thanks,      Zeb Galloway

## 2017-04-07 ENCOUNTER — TELEPHONE (OUTPATIENT)
Dept: OBSTETRICS AND GYNECOLOGY | Facility: CLINIC | Age: 27
End: 2017-04-07

## 2017-04-07 ENCOUNTER — HOSPITAL ENCOUNTER (OUTPATIENT)
Facility: HOSPITAL | Age: 27
Discharge: HOME | End: 2017-04-07
Attending: OBSTETRICS & GYNECOLOGY | Admitting: OBSTETRICS & GYNECOLOGY
Payer: MEDICAID

## 2017-04-07 VITALS
DIASTOLIC BLOOD PRESSURE: 67 MMHG | BODY MASS INDEX: 28.79 KG/M2 | WEIGHT: 190 LBS | RESPIRATION RATE: 18 BRPM | TEMPERATURE: 98 F | HEIGHT: 68 IN | HEART RATE: 86 BPM | SYSTOLIC BLOOD PRESSURE: 127 MMHG

## 2017-04-07 DIAGNOSIS — O26.899 ABDOMINAL PAIN AFFECTING PREGNANCY: ICD-10-CM

## 2017-04-07 DIAGNOSIS — R10.9 ABDOMINAL PAIN AFFECTING PREGNANCY: ICD-10-CM

## 2017-04-07 LAB
BACTERIA #/AREA URNS HPF: ABNORMAL /HPF
BILIRUB UR QL STRIP: ABNORMAL
CLARITY UR: ABNORMAL
COLOR UR: YELLOW
GLUCOSE UR QL STRIP: NEGATIVE
HGB UR QL STRIP: NEGATIVE
KETONES UR QL STRIP: ABNORMAL
LEUKOCYTE ESTERASE UR QL STRIP: ABNORMAL
MICROSCOPIC COMMENT: ABNORMAL
NITRITE UR QL STRIP: NEGATIVE
PH UR STRIP: 6 [PH] (ref 5–8)
PROT UR QL STRIP: ABNORMAL
RBC #/AREA URNS HPF: 3 /HPF (ref 0–4)
SP GR UR STRIP: >=1.03 (ref 1–1.03)
SQUAMOUS #/AREA URNS HPF: 6 /HPF
URN SPEC COLLECT METH UR: ABNORMAL
UROBILINOGEN UR STRIP-ACNC: 1 EU/DL
WBC #/AREA URNS HPF: 8 /HPF (ref 0–5)

## 2017-04-07 PROCEDURE — 81000 URINALYSIS NONAUTO W/SCOPE: CPT

## 2017-04-07 PROCEDURE — 99211 OFF/OP EST MAY X REQ PHY/QHP: CPT

## 2017-04-07 PROCEDURE — 59025 FETAL NON-STRESS TEST: CPT | Mod: 26,S$PBB,, | Performed by: OBSTETRICS & GYNECOLOGY

## 2017-04-07 PROCEDURE — 99213 OFFICE O/P EST LOW 20 MIN: CPT | Mod: 25,TH,S$PBB, | Performed by: OBSTETRICS & GYNECOLOGY

## 2017-04-07 PROCEDURE — 59025 FETAL NON-STRESS TEST: CPT

## 2017-04-07 NOTE — DISCHARGE SUMMARY
[facility] -   Discharge Summary  Obstetrics - Triage      Admit Date: 2017    Discharge Date and Time  2017 2:44 PM:     Attending Physician: Vera Dorado, *     Discharge Provider: Lolly Espino    Reason for Admission: abdominal pain in pregnancy    Procedures Performed: * No surgery found *    Hospital Course (synopsis of major diagnoses, care, treatment, and services provided during the course of the hospital stay):     Radha Vanegas is a 27 y.o.  CaucasianF at 35w5d presents complaining of abdominal cramping.     This IUP is complicated by Hepatitis, hx drug abuse; late prenatal care due to incarceration.  Patient denies contractions, denies vaginal bleeding, denies LOF.   Fetal Movement: normal.     Vital Signs:   LMP 10/11/2016 (Approximate)  Breastfeeding? No      Physical Exam:   General:   in no apparent distress, well developed and well nourished, non-toxic, in no respiratory distress and acyanotic, alert, oriented times 3, afebrile and normal vitals   Cardiovascular: not examined   Respiratory: Respirations unlabored   Abdominal: FHT present and soft nontender   Extremities: no redness or tenderness in the calves or thighs, no edema     SVE: deferred    NST: reactive  TOCO: None    Labs:  Blood type: O POS  UA: dehydration noted with + ketone, dark urine    ASSESSMENT: Radha Vanegas is a 27 y.o.   at 35w5d with dehydration.    Final Diagnoses:    Principal Problem: <principal problem not specified>   Secondary Diagnoses:   Active Hospital Problems    Diagnosis  POA   No active problems to display.      Resolved Hospital Problems    Diagnosis Date Resolved POA    Abdominal pain affecting pregnancy [O26.899, R10.9] 2017 Yes       PLAN:  1. Encouraged to PO hydrate, labor precautions, FKCs, keep appt as scheduled for Tuesday  2. Discharge Condition: good  3. Discharge Disposition: Discharge to Home     Pt was given routine  pregnancy instructions including to return to triage if she had vaginal bleeding (other than spotting for the next 48 hrs), any loss of fluid, decreased fetal movement, or contractions that occur every 2-5 min lasting for 2 hours or more. Pt was also instructed to drink about 8-10 glasses of water per day. She was also given instructions to return for pre-eclampsia symptoms including: headache not relieved with tylenol, shortness of breath, changes in her vision, or pain in the right upper quadrant of her abdomen. Patient voiced understanding of all these instructions and was subsequently discharged home.    Follow Up/Patient Instructions:     Medications:  Reconciled Home Medications:   Current Discharge Medication List      CONTINUE these medications which have NOT CHANGED    Details   lamotrigine (LAMICTAL) 25 MG tablet Take 1 tablet (25 mg total) by mouth once daily.  Qty: 30 tablet, Refills: 3      paroxetine (PAXIL) 20 MG tablet Take 1 tablet (20 mg total) by mouth every morning.  Qty: 30 tablet, Refills: 2      quetiapine (SEROQUEL) 50 MG tablet Take 1 tablet (50 mg total) by mouth every evening.  Qty: 30 tablet, Refills: 2         STOP taking these medications       norgestimate-ethinyl estradiol (ORTHO TRI-CYCLEN,TRI-SPRINTEC) 0.18/0.215/0.25 mg-35 mcg (28) tablet Comments:   Reason for Stopping:         tramadol (ULTRAM) 50 mg tablet Comments:   Reason for Stopping:               Discharge Procedure Orders  Diet general       Follow-up Information     Follow up On 4/11/2017.    Why:  If symptoms worsen, As needed

## 2017-04-07 NOTE — IP AVS SNAPSHOT
[facility]  [address] [place] [address] [address] [place] [address]           Patient Discharge Instructions   Our goal is to set you up for success. This packet includes information on your condition, medications, and your home care.  It will help you care for yourself to prevent having to return to the hospital.     Please ask your nurse if you have any questions.      There are many details to remember when preparing to leave the hospital. Here is what you will need to do:    1. Take your medicine. If you are prescribed medications, review your Medication List on the following pages. You may have new medications to  at the pharmacy and others that you'll need to stop taking. Review the instructions for how and when to take your medications. Talk with your doctor or nurses if you are unsure of what to do.     2. Go to your follow-up appointments. Specific follow-up information is listed in the following pages. Your may be contacted by a nurse or clinical provider about future appointments. Be sure we have all of the phone numbers to reach you. Please contact your provider's office if you are unable to make an appointment.     3. Watch for warning signs. Your doctor or nurse will give you detailed warning signs to watch for and when to call for assistance. These instructions may also include educational information about your condition. If you experience any of warning signs to your health, call your doctor.           [facility]  Unless otherwise directed by your provider, please   contact [facility], our nurse care line   that is available for 24/7 assistance.     [phone] (toll-free)     Registered nurses in the [facility]   provide: appointment scheduling, clinical advisement, health education, and other advisory services.                  ** Verify the list of medication(s) below is accurate and up to date. Carry this with you in case of emergency. If your  medications have changed, please notify your healthcare provider.             Medication List      CONTINUE taking these medications        Additional Info                      lamotrigine 25 MG tablet   Commonly known as:  LAMICTAL   Quantity:  30 tablet   Refills:  3   Dose:  25 mg    Instructions:  Take 1 tablet (25 mg total) by mouth once daily.     Begin Date    AM    Noon    PM    Bedtime       paroxetine 20 MG tablet   Commonly known as:  PAXIL   Quantity:  30 tablet   Refills:  2   Dose:  20 mg    Instructions:  Take 1 tablet (20 mg total) by mouth every morning.     Begin Date    AM    Noon    PM    Bedtime       quetiapine 50 MG tablet   Commonly known as:  SEROQUEL   Quantity:  30 tablet   Refills:  2   Dose:  50 mg    Instructions:  Take 1 tablet (50 mg total) by mouth every evening.     Begin Date    AM    Noon    PM    Bedtime         STOP taking these medications     norgestimate-ethinyl estradiol 0.18/0.215/0.25 mg-35 mcg (28) tablet   Commonly known as:  ORTHO TRI-CYCLEN,TRI-SPRINTEC       tramadol 50 mg tablet   Commonly known as:  ULTRAM                  Please bring to all follow up appointments:    1. A copy of your discharge instructions.  2. All medicines you are currently taking in their original bottles.  3. Identification and insurance card.    Please arrive 15 minutes ahead of scheduled appointment time.    Please call 24 hours in advance if you must reschedule your appointment and/or time.        Your Scheduled Appointments     Apr 10, 2017  9:40 AM CDT   GASTROENTEROLOGY ESTABLISHED PATIENT with MARGO Najera   [facility]astroenter[facility] ([facility])    [address] [facility] [facility] [address]  [place] [address]   [phone]            Apr 11, 2017  1:30 PM CDT   Routine Prenatal Visit with NAVEEN Clements ([facility])    [address]  [facility] [id number] [phone]              Follow-up Information     Follow up On 4/11/2017.     Why:  If symptoms worsen, As needed        Discharge Instructions     Future Orders    Diet general     Questions:    Total calories:      Fat restriction, if any:      Protein restriction, if any:      Na restriction, if any:      Fluid restriction:      Additional restrictions:          Discharge Instructions        Discharge Instructions    Self Care Instructions:    Diet:  · Eat from the five basic food groups  · Fruits and proteins are good choices  · Limit fast foods and added salt/sugar  · Moderate carbonated and caffeine drinks    Hydration:  · Drink at least 8 large glasses of water a day    Kick Counts:  · After a meal, rest on your side and note the baby's movements until you have 8-10 movements in a 2 hour counting period.    · If you do not feel your baby move 8-10 times within 2 hours or you sense a change in the type or character of the baby's movement, you should come in to the hospital at once.  · Remember; your baby can sleep for 20-40 minutes at a time.      When to notify your provider:    · Vaginal bleeding like a period;  You may spot if we examined your cervix.  · If your water breaks, come to the birth center.  Note time, color and odor.  · Abdominal tenderness or pain that does not go away  · Contractions every 3 to 5 minutes for 1 to 2 hours.  True contractions move from front to back, are regular; usually get longer, stronger and closer together and do not stop if you change your position or activity.  · Any burning, urgency or frequency in relation to emptying your bladder.  · Any temperature greater than 100.4 degrees, chills, flu-like symptoms          Discharge References/Attachments     LABOR, RECOGNIZING (ENGLISH)    KICK COUNTS (ENGLISH)        Admission Information     Date & Time Provider Department CSN    2017 12:16 PM Vera Dorado MD [facility] [id number]      Care Providers     Provider Role Specialty Primary office phone    Vera SAGASTUME  Tita Dorado MD Attending Provider Obstetrics [phone]      Your Vitals Were     Last Period                   10/11/2016 (Approximate)           Recent Lab Values     No lab values to display.      Allergies as of 4/7/2017     No Known Allergies      Advance Directives     An advance directive is a document which, in the event you are no longer able to make decisions for yourself, tells your healthcare team what kind of treatment you do or do not want to receive, or who you would like to make those decisions for you.  If you do not currently have an advance directive, [facility] encourages you to create one.  For more information call:  [phone] ([phone]), [phone] ([phone]),  or log on to [email or web address].        Smoking Cessation     If you would like to quit smoking:   You may be eligible for free services if you are a [place] resident and started smoking cigarettes before September 1, 1988.  Call the Smoking Cessation Trust ([facility]) toll free at ([phone]) 719-7643 or [phone] [phone].   Call 8-013-QUIT-NOW if you do not meet the above criteria.   Contact us via email: [email or web address]   View our website for more information: [email or web address]        Language Assistance Services     ATTENTION: Language assistance services are available, free of charge. Please call [phone].      ATENCIÓN: Si habla español, tiene a montano disposición servicios gratuitos de asistencia lingüística. Llame al [phone].     CHÚ Ý: N?u b?n nói Ti?ng Vi?t, có các d?ch v? h? tr? ngôn ng? mi?n phí dành cho b?n. G?i s? [phone].         [facility] complies with applicable Federal civil rights laws and does not discriminate on the basis of race, color, national origin, age, disability, or sex.

## 2017-04-07 NOTE — TELEPHONE ENCOUNTER
----- Message from Daniela Leon sent at 4/7/2017 10:52 AM CDT -----  Contact: Patient  Radha, patient [phone], Patient is 36 weeks and having contractions. Call  transferred to POD. Please advise Thanks.

## 2017-04-07 NOTE — PROGRESS NOTES
FETAL SURVEILLANCE TESTING SUMMARY  NST    INDICATIONS:  rule out uterine contractions    Fetal doppler heart rate tracing obtained in the usual fashion with the patient in the left supine position.    OBJECTIVE RESULTS:  Baseline fetal heart rate: 135 bpm    Fetal heart variability: moderate  Fetal Heart Rate decelerations: none  Fetal Heart Rate accelerations: yes  Baseline FHR: 135 per minute  Fetal Non-stress Test: reactive    Fetal surveillance: reassuring

## 2017-04-11 ENCOUNTER — ROUTINE PRENATAL (OUTPATIENT)
Dept: OBSTETRICS AND GYNECOLOGY | Facility: CLINIC | Age: 27
End: 2017-04-11
Payer: MEDICAID

## 2017-04-11 VITALS
SYSTOLIC BLOOD PRESSURE: 120 MMHG | BODY MASS INDEX: 29.97 KG/M2 | DIASTOLIC BLOOD PRESSURE: 80 MMHG | WEIGHT: 197.06 LBS

## 2017-04-11 DIAGNOSIS — Z34.93 ENCOUNTER FOR SUPERVISION OF NORMAL PREGNANCY IN THIRD TRIMESTER, UNSPECIFIED GRAVIDITY: Primary | ICD-10-CM

## 2017-04-11 PROCEDURE — 99213 OFFICE O/P EST LOW 20 MIN: CPT | Mod: TH,S$PBB,, | Performed by: ADVANCED PRACTICE MIDWIFE

## 2017-04-11 PROCEDURE — 99212 OFFICE O/P EST SF 10 MIN: CPT | Mod: PBBFAC,PO | Performed by: ADVANCED PRACTICE MIDWIFE

## 2017-04-11 PROCEDURE — 87081 CULTURE SCREEN ONLY: CPT

## 2017-04-11 PROCEDURE — 99999 PR PBB SHADOW E&M-EST. PATIENT-LVL II: CPT | Mod: PBBFAC,,, | Performed by: ADVANCED PRACTICE MIDWIFE

## 2017-04-11 NOTE — PROGRESS NOTES
Doing well. GBS collected. All court ordered drug testing continues to be negative.  States initial visit with Dr Galloway went well. Will f/u tomorrow with him. Labor talk, kick counts. bg

## 2017-04-12 ENCOUNTER — OFFICE VISIT (OUTPATIENT)
Dept: GASTROENTEROLOGY | Facility: CLINIC | Age: 27
End: 2017-04-12
Payer: MEDICAID

## 2017-04-12 VITALS
DIASTOLIC BLOOD PRESSURE: 80 MMHG | BODY MASS INDEX: 29.4 KG/M2 | HEIGHT: 68 IN | SYSTOLIC BLOOD PRESSURE: 120 MMHG | HEART RATE: 64 BPM | WEIGHT: 194 LBS

## 2017-04-12 DIAGNOSIS — B19.10 HEPATITIS B AFFECTING PREGNANCY: Primary | ICD-10-CM

## 2017-04-12 DIAGNOSIS — O98.419 HEPATITIS B AFFECTING PREGNANCY: Primary | ICD-10-CM

## 2017-04-12 DIAGNOSIS — B18.2 CHRONIC HEPATITIS C WITHOUT HEPATIC COMA: ICD-10-CM

## 2017-04-12 PROCEDURE — 99213 OFFICE O/P EST LOW 20 MIN: CPT | Mod: PBBFAC,PO | Performed by: NURSE PRACTITIONER

## 2017-04-12 PROCEDURE — 99214 OFFICE O/P EST MOD 30 MIN: CPT | Mod: S$PBB,,, | Performed by: NURSE PRACTITIONER

## 2017-04-12 PROCEDURE — 99999 PR PBB SHADOW E&M-EST. PATIENT-LVL III: CPT | Mod: PBBFAC,,, | Performed by: NURSE PRACTITIONER

## 2017-04-12 NOTE — MR AVS SNAPSHOT
[facility]  [address] [place] [address]  Phone: [phone]  Fax: [phone]                  Radha Vanegas   2017 2:00 PM   Office Visit    Description:  Female : 1990   Provider:  MARGO Najera   Department:  [facility]           Reason for Visit     Follow-up           Diagnoses this Visit        Comments    Hepatitis B affecting pregnancy    -  Primary     Chronic hepatitis C without hepatic coma                To Do List           Future Appointments        Provider Department Dept Phone    2017 10:45 AM Rita Pagan CNM [facility] - OBGYN [phone]      Goals (5 Years of Data)     None      Follow-Up and Disposition     Return in about 4 months (around 2017).      [facility]     [facility] Nurse Care Line -  Assistance  Unless otherwise directed by your provider, please contact [facility], our nurse care line that is available for  assistance.     Registered nurses in the [facility] provide: appointment scheduling, clinical advisement, health education, and other advisory services.  Call: [phone] (toll free)               Medications           Message regarding Medications     Verify the changes and/or additions to your medication regime listed below are the same as discussed with your clinician today.  If any of these changes or additions are incorrect, please notify your healthcare provider.             Verify that the below list of medications is an accurate representation of the medications you are currently taking.  If none reported, the list may be blank. If incorrect, please contact your healthcare provider. Carry this list with you in case of emergency.           Current Medications     prenatal vit #105-iron-FA-dha 30 mg iron- 1.4 mg-300 mg Cmpk Take by mouth.           Clinical Reference Information           Prenatal Vitals     Enc. Date GA Prenatal Vitals Prenatal  "Pulse Pain Level Urine Albumin/Glucose Edema Presentation Dilation/Effacement/Station    4/12/17 36w3d 120/80 / 88 kg (194 lb 0.1 oz)  64         4/11/17 36w2d 120/80 / 89.4 kg (197 lb 1.5 oz)  / 138 / Present  0  None / None / None / No      4/7/17 35w5d Admission Dept: [facility] L&D    3/14/17 32w2d 118/60 / 86.4 kg (190 lb 7.6 oz)  / 152  0        3/8/17 31w3d 114/74 / 86.3 kg (190 lb 4.1 oz)  / 131 / Present  0 Negative / Negative None / None         Height: 5' 8" (1.727 m)       Your Vitals Were     BP Pulse Height Weight Last Period BMI    120/80 64 5' 8" (1.727 m) 88 kg (194 lb 0.1 oz) 10/11/2016 (Approximate) 29.5 kg/m2      Blood Pressure          Most Recent Value    BP  120/80      Allergies as of 4/12/2017     No Known Allergies      Immunizations Administered on Date of Encounter - 4/12/2017     None      Orders Placed During Today's Visit     Future Labs/Procedures Expected by Expires    Hepatitis A antibody, IgG  4/12/2017 6/11/2018    Hepatitis B core antibody, total  4/12/2017 6/11/2018    Hepatitis B e antibody  4/12/2017 6/11/2018    Hepatitis B e antigen  4/12/2017 6/11/2018    Hepatitis B surface antibody  4/12/2017 6/11/2018    Hepatitis B surface antigen  4/12/2017 6/11/2018    HEPATITIS B VIRAL DNA, QUANTITATIVE  4/12/2017 6/11/2018      Language Assistance Services     ATTENTION: Language assistance services are available, free of charge. Please call [phone].      ATENCIÓN: Si habla español, tiene a montano disposición servicios gratuitos de asistencia lingüística. Llame al [phone].     CHÚ Ý: N?u b?n nói Ti?ng Vi?t, có các d?ch v? h? tr? ngôn ng? mi?n phí dành cho b?n. G?i s? [phone].         [facility] complies with applicable Federal civil rights laws and does not discriminate on the basis of race, color, national origin, age, disability, or sex.        "

## 2017-04-13 NOTE — PROGRESS NOTES
Clinic Follow Up:  [facility] Follow Up Note    Reason for Follow Up:  The primary encounter diagnosis was Hepatitis B affecting pregnancy. A diagnosis of Chronic hepatitis C without hepatic coma was also pertinent to this visit.    PCP: Anika Johnston       HPI:  This is a 27 y.o. female here for follow up of the above  Pt was reviously seen by Dr. Galloway, comes today for follow up.  SHe was recently diagnosed with Hep B and Hep C.  Pt is 32 weeks pregnant.    Recent labs show a HBV viral ct of <20.  Hep B ag is positive, AB is negative.  SHe als was diagnosed with HCV, GT 1a.  Pt states that she is overall feeling well without any GI complaints.  Denies any abdominal pain.  No nausea or vomiting.  No change in bowel pattern.  No melena or hematochezia. No weight loss.  No upper GI bleeding.  No ascites or BLE.  No overt confusion.  She is due to deliver on 17.  Plans for infant per OB.    Review of Systems   Constitutional: Negative for chills, fever, malaise/fatigue and weight loss.   Respiratory: Negative for cough.    Cardiovascular: Negative for chest pain.   Gastrointestinal:        Per HPI   Musculoskeletal: Negative for myalgias.   Skin: Negative for itching and rash.   Neurological: Negative for headaches.   Psychiatric/Behavioral: The patient is not nervous/anxious.        Medical History:  Past Medical History:   Diagnosis Date    ADD (attention deficit disorder)     Anxiety     Depression     Hepatitis     Hep A,B,C    Hypertension     Migraine headache     Panic attack        Surgical History:   Past Surgical History:   Procedure Laterality Date    APPENDECTOMY       SECTION      x1       Family History:   Family History   Problem Relation Age of Onset    Hypertension Mother     Breast cancer Neg Hx     Ovarian cancer Neg Hx     Deep vein thrombosis Neg Hx     Stomach cancer Neg Hx     Liver disease Neg Hx     Colon cancer Neg Hx        Social History:  "  Social History   Substance Use Topics    Smoking status: Former Smoker    Smokeless tobacco: Never Used    Alcohol use No       Allergies: Reviewed    Home Medications:  Current Outpatient Prescriptions on File Prior to Visit   Medication Sig Dispense Refill    prenatal vit #105-iron-FA-dha 30 mg iron- 1.4 mg-300 mg Cmpk Take by mouth.       No current facility-administered medications on file prior to visit.        Physical Exam:  Vital Signs:  /80  Pulse 64  Ht 5' 8" (1.727 m)  Wt 88 kg (194 lb 0.1 oz)  LMP 10/11/2016 (Approximate)  BMI 29.5 kg/m2  Body mass index is 29.5 kg/(m^2).  Physical Exam   Constitutional: She is oriented to person, place, and time. She appears well-developed and well-nourished.   HENT:   Head: Normocephalic.   Eyes: No scleral icterus.   Neck: Normal range of motion.   Cardiovascular: Normal rate and regular rhythm.    Pulmonary/Chest: Effort normal and breath sounds normal.   Abdominal: Soft. Bowel sounds are normal. There is no tenderness.   Musculoskeletal: Normal range of motion.   Neurological: She is alert and oriented to person, place, and time.   Skin: Skin is warm and dry.   Psychiatric: She has a normal mood and affect.   Vitals reviewed.      Labs: Pertinent labs reviewed.      Assessment:  1. Hepatitis B affecting pregnancy    2. Chronic hepatitis C without hepatic coma        Recommendations:  - Case discussed with Dr. Tomlinson  - Will recheck labs 3 months and 6 months PP  - Treatment needs will be determined by those labs.  Hepatitis B affecting pregnancy  Chronic hepatitis C without hepatic coma  -     Hepatitis B e antigen; Future; Expected date: 4/12/17  -     Hepatitis B e antibody; Future; Expected date: 4/12/17  -     Hepatitis A antibody, IgG; Future; Expected date: 4/12/17  -     Hepatitis B surface antibody; Future; Expected date: 4/12/17  -     Hepatitis B surface antigen; Future; Expected date: 4/12/17  -     Hepatitis B core antibody, total; " Future; Expected date: 4/12/17  -     HEPATITIS B VIRAL DNA, QUANTITATIVE; Future; Expected date: 4/12/17        Return to Clinic:    Return in about 4 months (around 8/12/2017).

## 2017-04-15 LAB — BACTERIA SPEC AEROBE CULT: NORMAL

## 2017-04-18 ENCOUNTER — ROUTINE PRENATAL (OUTPATIENT)
Dept: OBSTETRICS AND GYNECOLOGY | Facility: CLINIC | Age: 27
End: 2017-04-18
Payer: MEDICAID

## 2017-04-18 VITALS
DIASTOLIC BLOOD PRESSURE: 80 MMHG | WEIGHT: 200.31 LBS | BODY MASS INDEX: 30.45 KG/M2 | SYSTOLIC BLOOD PRESSURE: 128 MMHG

## 2017-04-18 DIAGNOSIS — Z34.93 ENCOUNTER FOR SUPERVISION OF NORMAL PREGNANCY IN THIRD TRIMESTER, UNSPECIFIED GRAVIDITY: Primary | ICD-10-CM

## 2017-04-18 PROCEDURE — 99213 OFFICE O/P EST LOW 20 MIN: CPT | Mod: TH,S$PBB,, | Performed by: ADVANCED PRACTICE MIDWIFE

## 2017-04-18 PROCEDURE — 99999 PR PBB SHADOW E&M-EST. PATIENT-LVL II: CPT | Mod: PBBFAC,,, | Performed by: ADVANCED PRACTICE MIDWIFE

## 2017-04-18 PROCEDURE — 99212 OFFICE O/P EST SF 10 MIN: CPT | Mod: PBBFAC,PO | Performed by: ADVANCED PRACTICE MIDWIFE

## 2017-04-25 ENCOUNTER — ANESTHESIA EVENT (OUTPATIENT)
Dept: OBSTETRICS AND GYNECOLOGY | Facility: HOSPITAL | Age: 27
End: 2017-04-25
Payer: MEDICAID

## 2017-04-25 ENCOUNTER — PROCEDURE VISIT (OUTPATIENT)
Dept: OBSTETRICS AND GYNECOLOGY | Facility: CLINIC | Age: 27
End: 2017-04-25
Payer: MEDICAID

## 2017-04-25 ENCOUNTER — ANESTHESIA (OUTPATIENT)
Dept: OBSTETRICS AND GYNECOLOGY | Facility: HOSPITAL | Age: 27
End: 2017-04-25
Payer: MEDICAID

## 2017-04-25 ENCOUNTER — ROUTINE PRENATAL (OUTPATIENT)
Dept: OBSTETRICS AND GYNECOLOGY | Facility: CLINIC | Age: 27
End: 2017-04-25
Payer: MEDICAID

## 2017-04-25 ENCOUNTER — HOSPITAL ENCOUNTER (INPATIENT)
Facility: HOSPITAL | Age: 27
LOS: 3 days | Discharge: HOME | End: 2017-04-28
Attending: OBSTETRICS & GYNECOLOGY | Admitting: OBSTETRICS & GYNECOLOGY
Payer: MEDICAID

## 2017-04-25 ENCOUNTER — SURGERY (OUTPATIENT)
Age: 27
End: 2017-04-25

## 2017-04-25 VITALS
BODY MASS INDEX: 31.64 KG/M2 | SYSTOLIC BLOOD PRESSURE: 140 MMHG | DIASTOLIC BLOOD PRESSURE: 90 MMHG | WEIGHT: 208.13 LBS

## 2017-04-25 DIAGNOSIS — O36.5990 PREGNANCY AFFECTED BY FETAL GROWTH RESTRICTION: ICD-10-CM

## 2017-04-25 DIAGNOSIS — O14.00: ICD-10-CM

## 2017-04-25 DIAGNOSIS — Z3A.38 38 WEEKS GESTATION OF PREGNANCY: ICD-10-CM

## 2017-04-25 DIAGNOSIS — R03.0 ELEVATED BP WITHOUT DIAGNOSIS OF HYPERTENSION: ICD-10-CM

## 2017-04-25 DIAGNOSIS — Z34.93 ENCOUNTER FOR SUPERVISION OF NORMAL PREGNANCY IN THIRD TRIMESTER, UNSPECIFIED GRAVIDITY: Primary | ICD-10-CM

## 2017-04-25 DIAGNOSIS — Z98.891 STATUS POST REPEAT LOW TRANSVERSE CESAREAN SECTION: Primary | ICD-10-CM

## 2017-04-25 LAB
ABO + RH BLD: NORMAL
ALBUMIN SERPL BCP-MCNC: 2.4 G/DL
ALP SERPL-CCNC: 182 U/L
ALT SERPL W/O P-5'-P-CCNC: 51 U/L
AMPHET+METHAMPHET UR QL: NEGATIVE
ANION GAP SERPL CALC-SCNC: 10 MMOL/L
AST SERPL-CCNC: 40 U/L
BARBITURATES UR QL SCN>200 NG/ML: NEGATIVE
BASOPHILS # BLD AUTO: 0.02 K/UL
BASOPHILS NFR BLD: 0.2 %
BENZODIAZ UR QL SCN>200 NG/ML: NEGATIVE
BILIRUB SERPL-MCNC: 0.3 MG/DL
BLD GP AB SCN CELLS X3 SERPL QL: NORMAL
BUN SERPL-MCNC: 13 MG/DL
BZE UR QL SCN: NEGATIVE
CALCIUM SERPL-MCNC: 8.3 MG/DL
CANNABINOIDS UR QL SCN: NEGATIVE
CHLORIDE SERPL-SCNC: 112 MMOL/L
CO2 SERPL-SCNC: 19 MMOL/L
CREAT SERPL-MCNC: 0.7 MG/DL
CREAT UR-MCNC: 267.9 MG/DL
CREAT UR-MCNC: 267.9 MG/DL
DIFFERENTIAL METHOD: ABNORMAL
EOSINOPHIL # BLD AUTO: 0.1 K/UL
EOSINOPHIL NFR BLD: 0.7 %
ERYTHROCYTE [DISTWIDTH] IN BLOOD BY AUTOMATED COUNT: 12.9 %
EST. GFR  (AFRICAN AMERICAN): >60 ML/MIN/1.73 M^2
EST. GFR  (NON AFRICAN AMERICAN): >60 ML/MIN/1.73 M^2
GLUCOSE SERPL-MCNC: 71 MG/DL
HCT VFR BLD AUTO: 33.1 %
HGB BLD-MCNC: 11.5 G/DL
LYMPHOCYTES # BLD AUTO: 2 K/UL
LYMPHOCYTES NFR BLD: 24.8 %
MCH RBC QN AUTO: 31.3 PG
MCHC RBC AUTO-ENTMCNC: 34.7 %
MCV RBC AUTO: 90 FL
METHADONE UR QL SCN>300 NG/ML: NEGATIVE
MONOCYTES # BLD AUTO: 0.5 K/UL
MONOCYTES NFR BLD: 5.7 %
NEUTROPHILS # BLD AUTO: 5.5 K/UL
NEUTROPHILS NFR BLD: 68.6 %
OPIATES UR QL SCN: NEGATIVE
PCP UR QL SCN>25 NG/ML: NEGATIVE
PLATELET # BLD AUTO: 160 K/UL
PMV BLD AUTO: 12.8 FL
POTASSIUM SERPL-SCNC: 3.9 MMOL/L
PROT SERPL-MCNC: 6.1 G/DL
PROT UR-MCNC: 34 MG/DL
PROT/CREAT RATIO, UR: 0.13
RBC # BLD AUTO: 3.67 M/UL
SODIUM SERPL-SCNC: 141 MMOL/L
TOXICOLOGY INFORMATION: NORMAL
WBC # BLD AUTO: 8.05 K/UL

## 2017-04-25 PROCEDURE — 37000009 HC ANESTHESIA EA ADD 15 MINS: Performed by: OBSTETRICS & GYNECOLOGY

## 2017-04-25 PROCEDURE — 59025 FETAL NON-STRESS TEST: CPT

## 2017-04-25 PROCEDURE — 86920 COMPATIBILITY TEST SPIN: CPT

## 2017-04-25 PROCEDURE — 25000003 PHARM REV CODE 250: Performed by: NURSE ANESTHETIST, CERTIFIED REGISTERED

## 2017-04-25 PROCEDURE — 99499 UNLISTED E&M SERVICE: CPT | Mod: S$PBB,,, | Performed by: ADVANCED PRACTICE MIDWIFE

## 2017-04-25 PROCEDURE — 86900 BLOOD TYPING SEROLOGIC ABO: CPT

## 2017-04-25 PROCEDURE — 99999 PR PBB SHADOW E&M-EST. PATIENT-LVL II: CPT | Mod: PBBFAC,,, | Performed by: ADVANCED PRACTICE MIDWIFE

## 2017-04-25 PROCEDURE — 76820 UMBILICAL ARTERY ECHO: CPT | Mod: 26,S$PBB,, | Performed by: OBSTETRICS & GYNECOLOGY

## 2017-04-25 PROCEDURE — 80053 COMPREHEN METABOLIC PANEL: CPT | Mod: 91

## 2017-04-25 PROCEDURE — 63600175 PHARM REV CODE 636 W HCPCS: Performed by: NURSE ANESTHETIST, CERTIFIED REGISTERED

## 2017-04-25 PROCEDURE — 25000003 PHARM REV CODE 250: Performed by: ADVANCED PRACTICE MIDWIFE

## 2017-04-25 PROCEDURE — 84156 ASSAY OF PROTEIN URINE: CPT

## 2017-04-25 PROCEDURE — 36000685 HC CESAREAN SECTION LEVEL I

## 2017-04-25 PROCEDURE — 63600175 PHARM REV CODE 636 W HCPCS: Performed by: OBSTETRICS & GYNECOLOGY

## 2017-04-25 PROCEDURE — 76819 FETAL BIOPHYS PROFIL W/O NST: CPT | Mod: 26,S$PBB,, | Performed by: OBSTETRICS & GYNECOLOGY

## 2017-04-25 PROCEDURE — 85025 COMPLETE CBC W/AUTO DIFF WBC: CPT | Mod: 91

## 2017-04-25 PROCEDURE — 25000003 PHARM REV CODE 250: Performed by: OBSTETRICS & GYNECOLOGY

## 2017-04-25 PROCEDURE — 86901 BLOOD TYPING SEROLOGIC RH(D): CPT

## 2017-04-25 PROCEDURE — 51702 INSERT TEMP BLADDER CATH: CPT

## 2017-04-25 PROCEDURE — 11000001 HC ACUTE MED/SURG PRIVATE ROOM

## 2017-04-25 PROCEDURE — 99211 OFF/OP EST MAY X REQ PHY/QHP: CPT | Mod: 27

## 2017-04-25 PROCEDURE — 59514 CESAREAN DELIVERY ONLY: CPT | Mod: AT,,, | Performed by: OBSTETRICS & GYNECOLOGY

## 2017-04-25 PROCEDURE — 80307 DRUG TEST PRSMV CHEM ANLYZR: CPT

## 2017-04-25 PROCEDURE — 37000008 HC ANESTHESIA 1ST 15 MINUTES: Performed by: OBSTETRICS & GYNECOLOGY

## 2017-04-25 RX ORDER — SODIUM CITRATE AND CITRIC ACID MONOHYDRATE 334; 500 MG/5ML; MG/5ML
SOLUTION ORAL
Status: DISCONTINUED | OUTPATIENT
Start: 2017-04-25 | End: 2017-04-25

## 2017-04-25 RX ORDER — PROPOFOL 10 MG/ML
VIAL (ML) INTRAVENOUS
Status: DISCONTINUED | OUTPATIENT
Start: 2017-04-25 | End: 2017-04-25

## 2017-04-25 RX ORDER — SODIUM CHLORIDE, SODIUM LACTATE, POTASSIUM CHLORIDE, CALCIUM CHLORIDE 600; 310; 30; 20 MG/100ML; MG/100ML; MG/100ML; MG/100ML
INJECTION, SOLUTION INTRAVENOUS CONTINUOUS
Status: DISCONTINUED | OUTPATIENT
Start: 2017-04-25 | End: 2017-04-28 | Stop reason: HOSPADM

## 2017-04-25 RX ORDER — ONDANSETRON 8 MG/1
8 TABLET, ORALLY DISINTEGRATING ORAL EVERY 8 HOURS PRN
Status: DISCONTINUED | OUTPATIENT
Start: 2017-04-25 | End: 2017-04-25

## 2017-04-25 RX ORDER — AMOXICILLIN 250 MG
1 CAPSULE ORAL NIGHTLY PRN
Status: DISCONTINUED | OUTPATIENT
Start: 2017-04-25 | End: 2017-04-28 | Stop reason: HOSPADM

## 2017-04-25 RX ORDER — PHENYLEPHRINE HYDROCHLORIDE 10 MG/ML
INJECTION INTRAVENOUS
Status: DISCONTINUED | OUTPATIENT
Start: 2017-04-25 | End: 2017-04-25

## 2017-04-25 RX ORDER — ONDANSETRON 2 MG/ML
INJECTION INTRAMUSCULAR; INTRAVENOUS
Status: DISCONTINUED | OUTPATIENT
Start: 2017-04-25 | End: 2017-04-25

## 2017-04-25 RX ORDER — ACETAMINOPHEN 325 MG/1
650 TABLET ORAL EVERY 6 HOURS PRN
Status: DISCONTINUED | OUTPATIENT
Start: 2017-04-25 | End: 2017-04-28 | Stop reason: HOSPADM

## 2017-04-25 RX ORDER — OXYCODONE AND ACETAMINOPHEN 10; 325 MG/1; MG/1
1 TABLET ORAL EVERY 4 HOURS PRN
Status: DISCONTINUED | OUTPATIENT
Start: 2017-04-25 | End: 2017-04-28 | Stop reason: HOSPADM

## 2017-04-25 RX ORDER — DIPHENHYDRAMINE HYDROCHLORIDE 50 MG/ML
25 INJECTION INTRAMUSCULAR; INTRAVENOUS EVERY 4 HOURS PRN
Status: DISCONTINUED | OUTPATIENT
Start: 2017-04-25 | End: 2017-04-28 | Stop reason: HOSPADM

## 2017-04-25 RX ORDER — OXYCODONE AND ACETAMINOPHEN 5; 325 MG/1; MG/1
1 TABLET ORAL EVERY 4 HOURS PRN
Status: DISCONTINUED | OUTPATIENT
Start: 2017-04-25 | End: 2017-04-28 | Stop reason: HOSPADM

## 2017-04-25 RX ORDER — OXYTOCIN/RINGER'S LACTATE 20/1000 ML
PLASTIC BAG, INJECTION (ML) INTRAVENOUS CONTINUOUS PRN
Status: DISCONTINUED | OUTPATIENT
Start: 2017-04-25 | End: 2017-04-25

## 2017-04-25 RX ORDER — LIDOCAINE HCL/PF 100 MG/5ML
SYRINGE (ML) INTRAVENOUS
Status: DISCONTINUED | OUTPATIENT
Start: 2017-04-25 | End: 2017-04-25

## 2017-04-25 RX ORDER — MISOPROSTOL 200 UG/1
800 TABLET ORAL
Status: DISCONTINUED | OUTPATIENT
Start: 2017-04-25 | End: 2017-04-28 | Stop reason: HOSPADM

## 2017-04-25 RX ORDER — ONDANSETRON 8 MG/1
8 TABLET, ORALLY DISINTEGRATING ORAL EVERY 8 HOURS PRN
Status: DISCONTINUED | OUTPATIENT
Start: 2017-04-25 | End: 2017-04-28 | Stop reason: HOSPADM

## 2017-04-25 RX ORDER — BISACODYL 10 MG
10 SUPPOSITORY, RECTAL RECTAL ONCE AS NEEDED
Status: ACTIVE | OUTPATIENT
Start: 2017-04-25 | End: 2017-04-25

## 2017-04-25 RX ORDER — DIPHENHYDRAMINE HCL 25 MG
25 CAPSULE ORAL EVERY 4 HOURS PRN
Status: DISCONTINUED | OUTPATIENT
Start: 2017-04-25 | End: 2017-04-28 | Stop reason: HOSPADM

## 2017-04-25 RX ORDER — MORPHINE SULFATE 1 MG/ML
INJECTION, SOLUTION EPIDURAL; INTRATHECAL; INTRAVENOUS
Status: DISCONTINUED | OUTPATIENT
Start: 2017-04-25 | End: 2017-04-25

## 2017-04-25 RX ORDER — KETOROLAC TROMETHAMINE 30 MG/ML
INJECTION, SOLUTION INTRAMUSCULAR; INTRAVENOUS
Status: DISCONTINUED | OUTPATIENT
Start: 2017-04-25 | End: 2017-04-25

## 2017-04-25 RX ORDER — ADHESIVE BANDAGE
30 BANDAGE TOPICAL EVERY 6 HOURS PRN
Status: DISCONTINUED | OUTPATIENT
Start: 2017-04-26 | End: 2017-04-28 | Stop reason: HOSPADM

## 2017-04-25 RX ORDER — OXYTOCIN/RINGER'S LACTATE 20/1000 ML
41.65 PLASTIC BAG, INJECTION (ML) INTRAVENOUS CONTINUOUS
Status: ACTIVE | OUTPATIENT
Start: 2017-04-25 | End: 2017-04-26

## 2017-04-25 RX ORDER — SIMETHICONE 80 MG
1 TABLET,CHEWABLE ORAL EVERY 6 HOURS PRN
Status: DISCONTINUED | OUTPATIENT
Start: 2017-04-25 | End: 2017-04-28 | Stop reason: HOSPADM

## 2017-04-25 RX ORDER — ACETAMINOPHEN 500 MG
500 TABLET ORAL EVERY 6 HOURS PRN
Status: DISCONTINUED | OUTPATIENT
Start: 2017-04-25 | End: 2017-04-28 | Stop reason: HOSPADM

## 2017-04-25 RX ORDER — CEFAZOLIN SODIUM 2 G/50ML
2 SOLUTION INTRAVENOUS
Status: COMPLETED | OUTPATIENT
Start: 2017-04-25 | End: 2017-04-25

## 2017-04-25 RX ORDER — ZOLPIDEM TARTRATE 5 MG/1
5 TABLET ORAL NIGHTLY PRN
Status: DISCONTINUED | OUTPATIENT
Start: 2017-04-25 | End: 2017-04-28 | Stop reason: HOSPADM

## 2017-04-25 RX ORDER — ACETAMINOPHEN 10 MG/ML
1000 INJECTION, SOLUTION INTRAVENOUS ONCE
Status: COMPLETED | OUTPATIENT
Start: 2017-04-25 | End: 2017-04-26

## 2017-04-25 RX ADMIN — SODIUM CHLORIDE, SODIUM LACTATE, POTASSIUM CHLORIDE, AND CALCIUM CHLORIDE: 600; 310; 30; 20 INJECTION, SOLUTION INTRAVENOUS at 09:04

## 2017-04-25 RX ADMIN — ONDANSETRON 4 MG: 2 INJECTION, SOLUTION INTRAMUSCULAR; INTRAVENOUS at 09:04

## 2017-04-25 RX ADMIN — SODIUM CHLORIDE, SODIUM LACTATE, POTASSIUM CHLORIDE, AND CALCIUM CHLORIDE 2000 ML: .6; .31; .03; .02 INJECTION, SOLUTION INTRAVENOUS at 08:04

## 2017-04-25 RX ADMIN — PROPOFOL 40 MG: 10 INJECTION, EMULSION INTRAVENOUS at 09:04

## 2017-04-25 RX ADMIN — SODIUM CITRATE AND CITRIC ACID MONOHYDRATE 30 ML: 500; 334 SOLUTION ORAL at 08:04

## 2017-04-25 RX ADMIN — CEFAZOLIN SODIUM 2 G: 2 SOLUTION INTRAVENOUS at 09:04

## 2017-04-25 RX ADMIN — KETOROLAC TROMETHAMINE 30 MG: 30 INJECTION, SOLUTION INTRAMUSCULAR; INTRAVENOUS at 09:04

## 2017-04-25 RX ADMIN — PROPOFOL 40 MG: 10 INJECTION, EMULSION INTRAVENOUS at 10:04

## 2017-04-25 RX ADMIN — ACETAMINOPHEN 1000 MG: 10 INJECTION, SOLUTION INTRAVENOUS at 11:04

## 2017-04-25 RX ADMIN — MORPHINE SULFATE 150 MCG: 1 INJECTION, SOLUTION EPIDURAL; INTRATHECAL; INTRAVENOUS at 09:04

## 2017-04-25 RX ADMIN — Medication 333 MILLI-UNITS/MIN: at 11:04

## 2017-04-25 RX ADMIN — Medication: at 09:04

## 2017-04-25 RX ADMIN — LIDOCAINE HYDROCHLORIDE 30 MG: 20 INJECTION, SOLUTION INTRAVENOUS at 09:04

## 2017-04-25 RX ADMIN — OXYCODONE HYDROCHLORIDE AND ACETAMINOPHEN 1 TABLET: 10; 325 TABLET ORAL at 11:04

## 2017-04-25 RX ADMIN — PHENYLEPHRINE HYDROCHLORIDE 50 MCG: 10 INJECTION INTRAVENOUS at 09:04

## 2017-04-25 NOTE — H&P
"[facility]   Obstetrics  History & Physical    Patient Name: Radha Vanegas  MRN: [id number]  Admission Date: 2017  Primary Care Provider: Anika Johnston MD    Subjective:     Principal Problem:<principal problem not specified>    History of Present Illness:  Sent from clinic for high blood pressure and IUGR     Obstetric HPI:  Patient reports Date/time of onset: 17 irregular contractions, active fetal movement, No vaginal bleeding , No loss of fluid     This pregnancy has been complicated by heroin use, smoking, late prenatal care, Hep A,B,& C.     Obstetric History       T2      TAB1   SAB0   E0   M0   L1       # Outcome Date GA Lbr Brandon/2nd Weight Sex Delivery Anes PTL Lv   4 Current            3 Term 14 41w2d  4.321 kg (9 lb 8.4 oz) F CS-LTranv Spinal N Y      Apgar1:  9                Apgar5: 9   2 Term 13 39w5d  3.544 kg (7 lb 13 oz) M CS-Unspec      1 TAB                 Past Medical History:   Diagnosis Date    ADD (attention deficit disorder)     Anxiety     Depression     Hepatitis     Hep A,B,C    Hypertension     Migraine headache     Panic attack      Past Surgical History:   Procedure Laterality Date    APPENDECTOMY       SECTION      x1       PTA Medications   Medication Sig    prenatal vit #105-iron-FA-dha 30 mg iron- 1.4 mg-300 mg Cmpk Take by mouth.       Review of patient's allergies indicates:  No Known Allergies     Family History     Problem Relation (Age of Onset)    Hypertension Mother        Social History Main Topics    Smoking status: Former Smoker    Smokeless tobacco: Never Used    Alcohol use No    Drug use: No    Sexual activity: Yes     Partners: Male     Review of Systems   Constitutional: Positive for unexpected weight change.        7 lb weight gain since last visit according to patient   Eyes: Positive for visual disturbance.        "floaters"   Respiratory: Negative.    Cardiovascular: Positive for leg " swelling.   Gastrointestinal: Positive for abdominal pain.        Epigastric and RUQ abd pain since 1330 today   Genitourinary: Negative.    Musculoskeletal: Negative.    Skin:  Negative.   Neurological: Positive for headaches.   Hematological: Negative.    Psychiatric/Behavioral: Negative.       Objective:     Vital Signs (Most Recent):    Vital Signs (24h Range):  BP: (140)/(90) 140/90        There is no height or weight on file to calculate BMI.    FHT: 130 bpm Cat 1 (reassuring)  TOCO: none    Physical Exam:   Constitutional: She is oriented to person, place, and time. She appears well-developed and well-nourished.       Cardiovascular: Normal rate and regular rhythm.     Pulmonary/Chest: Effort normal and breath sounds normal.        Abdominal: Soft. Bowel sounds are normal.     Genitourinary: Uterus normal.               Neurological: She is alert and oriented to person, place, and time. She displays abnormal reflex.   DTR's +3    Skin: Skin is warm and dry.    Psychiatric: She has a normal mood and affect.       SVE defferred      Significant Labs:  Lab Results   Component Value Date    GROUPTRH O POS 2017    HEPBSAG Positive (A) 2017       I have personallly reviewed all pertinent lab results from the last 24 hours.    Assessment/Plan:     27 y.o. female  at 38w2d for:    Mild preeclampsia  Serial BP's, labs, UDS for drug Hx   NST   Discussed POC with Dr. Mcgill.    If severe PreE, will plan for c section tonight.       Lolly Mishra CNM  Obstetrics  [facility]

## 2017-04-25 NOTE — PROGRESS NOTES
Doing well. Few uc's yesterday, nothing regular. BP elevated, no PIH complaints. DTR's wnl. BPP 8/8. Overall growth 14%, AC 5%. S/D ratios mildly elevated proximal to PCI. Will consult Dr Dorado (reading u/s today).  PIH precautions provided. Staying clean. Was able to get joint custody of her daughter.

## 2017-04-25 NOTE — IP AVS SNAPSHOT
[address] [place] [address]           Patient Discharge Instructions   Our goal is to set you up for success. This packet includes information on your condition, medications, and your home care.  It will help you care for yourself to prevent having to return to the hospital.     Please ask your nurse if you have any questions.      There are many details to remember when preparing to leave the hospital. Here is what you will need to do:    1. Take your medicine. If you are prescribed medications, review your Medication List on the following pages. You may have new medications to  at the pharmacy and others that you'll need to stop taking. Review the instructions for how and when to take your medications. Talk with your doctor or nurses if you are unsure of what to do.     2. Go to your follow-up appointments. Specific follow-up information is listed in the following pages. Your may be contacted by a nurse or clinical provider about future appointments. Be sure we have all of the phone numbers to reach you. Please contact your provider's office if you are unable to make an appointment.     3. Watch for warning signs. Your doctor or nurse will give you detailed warning signs to watch for and when to call for assistance. These instructions may also include educational information about your condition. If you experience any of warning signs to your health, call your doctor.               ** Verify the list of medication(s) below is accurate and up to date. Carry this with you in case of emergency. If your medications have changed, please notify your healthcare provider.             Medication List      START taking these medications        Additional Info                      ibuprofen 600 MG tablet   Commonly known as:  ADVIL,MOTRIN   Quantity:  30 tablet   Refills:  1   Dose:  600 mg    Last time this was given:  600 mg on 4/28/2017  6:00 AM   Instructions:   Take 1 tablet (600 mg total) by mouth every 6 (six) hours as needed for Pain.     Begin Date    AM    Noon    PM    Bedtime       oxycodone-acetaminophen 5-325 mg per tablet   Commonly known as:  PERCOCET   Quantity:  30 tablet   Refills:  0   Dose:  1 tablet    Instructions:  Take 1 tablet by mouth every 4 (four) hours as needed for Pain.     Begin Date    AM    Noon    PM    Bedtime         CONTINUE taking these medications        Additional Info                      prenatal vit #105-iron-FA-dha 30 mg iron- 1.4 mg-300 mg Cmpk   Refills:  0    Instructions:  Take by mouth.     Begin Date    AM    Noon    PM    Bedtime            Where to Get Your Medications      These medications were sent to [facility]/pharmacy #[id number] - [place], [place] - [address]  [address] [address] [address]     Phone:  [phone]     ibuprofen 600 MG tablet    oxycodone-acetaminophen 5-325 mg per tablet                  Please bring to all follow up appointments:    1. A copy of your discharge instructions.  2. All medicines you are currently taking in their original bottles.  3. Identification and insurance card.    Please arrive 15 minutes ahead of scheduled appointment time.    Please call 24 hours in advance if you must reschedule your appointment and/or time.        Your Scheduled Appointments     May 02, 2017 11:45 AM CDT   [facility]    [address] [id number] [phone]            Jun 05, 2017 10:00 AM CDT   Post Partum with MD Austin Alejo - OB/ GYN ([facility])    [address] [facility] [id number] [phone]            Aug 14, 2017  9:45 AM CDT   Non-Fasting Lab with [facility]    [address] [id number] [phone]            Aug 28, 2017  9:00 AM CDT   GASTROENTEROLOGY ESTABLISHED PATIENT  "with MARGO Najera   [facility] - Gastroenterology ([facility])    [address] [place] [id number] [phone]              Follow-up Information     Follow up with Cecilia Mcgill MD. Go on 6/5/2017.    Specialties:  Obstetrics, Obstetrics and Gynecology    Why:  10AM    Contact information:    [address] [place] [address] [phone]          Follow up with Rita Pagan CNM. Go on 5/2/2017.    Specialty:  Obstetrics    Why:  11:45AM.....dressing removal    Contact information:    [address]  [place] [address] [phone]          Discharge Instructions     Future Orders    Call MD for:  difficulty breathing or increased cough     Call MD for:  persistent dizziness, light-headedness, or visual disturbances     Call MD for:  persistent nausea and vomiting or diarrhea     Call MD for:  redness, tenderness, or signs of infection (pain, swelling, redness, odor or green/yellow discharge around incision site)     Call MD for:  severe persistent headache     Call MD for:  severe uncontrolled pain     Call MD for:  temperature >100.4     Diet general     Questions:    Total calories:      Fat restriction, if any:      Protein restriction, if any:      Na restriction, if any:      Fluid restriction:      Additional restrictions:      Leave dressing on - Keep it clean, dry, and intact until clinic visit     Lifting restrictions     Scheduling Instructions:    No lifting more than 15 pounds x 4 weeks.    Other restrictions (specify):     Scheduling Instructions:    Pelvic rest x 6 weeks        Discharge Instructions       Mother Self Care:    Activity: Avoid strenuous exercise and get adequate rest.  No driving until the physician consent given.  Emotional Changes: Most women find birth to be a time of great emotional upheaval.  Sense of loss, mood swings, fatigue, anxiety, and feeling "let down" are common.  If feelings worsen or last more than a week, call your physician.  Breast " Care/Breastfeeding: Wear a bra for comfort.  Keep nipples dry and apply your own breast milk or lanolin cream as needed for soreness.  Engorgement can be relieved with warm, moist heat before feedings.  You may also take Ibuprofen.  Breast Care/Bottle Feeding: Wear support bra 24 hours a day for one week.  Avoid stimulation to breasts.  You may use ice packs for discomfort.  Petr-Care/Vaginal Bleeding: Remember to use your petr-bottle after urinating.  Your flow will change from red, to pink, to yellow/white color over a period of 2 weeks.  Menstruation will return in 3-8 weeks, or longer if breastfeeding.  Episiotomy Vaginal Delivery: Stitches will dissolve within 10 days to 3 weeks.  Warm baths, tucks, and dermoplast will promote healing.  Avoid bubble baths or strong soaps.   Section/Tubal Ligation: Keep incision clean and dry.  Please remove steri-strips in 5-7 days.  You may shower, but avoid baths.  Sexual Activity/Pelvic Rest: No sexual activity, tampons, or douching until your physician gives you consent.  Diet: Continue to eat from the five basic food groups, including plenty of protein, fruits, vegetables, and whole grains.  Limit empty calories and high fat foods.  Drink enough fluids to satisfy thirst and add an extra 500 calories for breastfeeding.  Constipation/Hemorrhoids: Drink plenty of water.  You may take a stool softener or natural laxative (Metamucil). You may use tucks or hemorrhoid ointment and soak in a warm tub.    CALL YOUR OB DOCTOR IF ANY OF THE FOLLOWING OCCURS:  *Heavy bleeding - saturating a pad an hour or passing any large (2-3 inches in size) blood clots.  *Any pain, redness, or tenderness in lower leg.  *You cannot care for yourself or your baby.  *Any signs of infection-      - Temperature greater than 100.5 degrees F      - Foul smelling vaginal discharge and/or incisional drainage      - Increased episiotomy or incisional pain      - Hot, hard, red or sore area on  "breast      - Flu-like symptoms      - Any urgency, frequency or burning with urination      Primary Diagnosis     Your primary diagnosis was:  Status Post Repeat Low Transverse  Section      Admission Information     Date & Time Provider Department CSN    2017  3:47 PM Cecilia Mcgill MD [facility] [id number]      Care Providers     Provider Role Specialty Primary office phone    Cecilia Mcgill MD Attending Provider Obstetrics [phone]    Cecilia Mcgill MD Surgeon  Obstetrics [phone]      Your Vitals Were     BP Pulse Temp Resp Height Weight    124/66 69 98.2 °F (36.8 °C) (Oral) 18 5' 8" (1.727 m) 94 kg (207 lb 3.7 oz)    Last Period SpO2 BMI          10/11/2016 (Approximate) 99% 31.51 kg/m2        Recent Lab Values     No lab values to display.      Pending Labs     Order Current Status    Specimen to Pathology - Surgery In process    Prepare RBC 2 Units; Hemorrhage Preliminary result      Allergies as of 2017     No Known Allergies      [facility]     [facility] Nurse Care Line -  Assistance  Unless otherwise directed by your provider, please contact [facility], our nurse care line that is available for  assistance.     Registered nurses in the [facility] provide clinical advisement, health education, appointment booking, and other advisory services.  Call for this free service at [phone].        Advance Directives     An advance directive is a document which, in the event you are no longer able to make decisions for yourself, tells your healthcare team what kind of treatment you do or do not want to receive, or who you would like to make those decisions for you.  If you do not currently have an advance directive, [facility] encourages you to create one.  For more information call:  [phone] ([phone]), [phone] ([phone]),  or log on to [email or web address].        Smoking Cessation     If you would like to " quit smoking:   You may be eligible for free services if you are a [place] resident and started smoking cigarettes before September 1, 1988.  Call the Smoking Cessation Trust ([facility]) toll free at (357) 675-6188 or [phone]) [phone].   Call 1-800-QUIT-NOW if you do not meet the above criteria.   Contact us via email: [email or web address]   View our website for more information: [email or web address]        Language Assistance Services     ATTENTION: Language assistance services are available, free of charge. Please call [phone].      ATENCIÓN: Si habla español, tiene a montano disposición servicios gratuitos de asistencia lingüística. Llame al [phone].     CHÚ Ý: N?u b?n nói Ti?ng Vi?t, có các d?ch v? h? tr? ngôn ng? mi?n phí dành cho b?n. G?i s? [phone].        Blood Transfusion Reaction Signs and Symptoms     The blood you have received has been matched for you as carefully as possible. Most patients who receive a blood transfusion do not experience problems. However, there can be a delayed reaction that happens a few weeks after your blood transfusion. Contact your physician immediately if you experience any NEW SYMPTOMS listed below:     Fever greater than 100.4 degrees    Chills   Yellow color to your skin or eyes(Jaundice)   Back pain, chest pain, or pain at the infusion site   Weakness (more than usual)   Discomfort or uneasiness more than usual (Malaise)   Nausea or vomiting   Shortness of breath, wheezing, or coughing   Higher or lower blood pressure than normal   Skin rash, itching, skin redness, or localized swelling (example: hands or feet)   Urinating less than normal   Urine appears reddish or orange and is darker than normal      Remember that some these signs may already exist for you--such as having chronic back pain or high blood pressure. You only need to look for and report to your doctor any new occurrences since your blood transfusion that are of concern.          [facility] complies with applicable Federal civil rights laws and does not discriminate on the basis of race, color, national origin, age, disability, or sex.

## 2017-04-25 NOTE — PROGRESS NOTES
Spoke to Dr Dorado. Wants pt to go to Labor and delivery for evaluation and NST. Called pt and informed her.  Verbalizes understanding. bg

## 2017-04-25 NOTE — SUBJECTIVE & OBJECTIVE
"Obstetric HPI:  Patient reports Date/time of onset: 17 irregular contractions, active fetal movement, No vaginal bleeding , No loss of fluid     This pregnancy has been complicated by heroin use, smoking, late prenatal care, Hep A,B,& C.     Obstetric History       T2      TAB1   SAB0   E0   M0   L1       # Outcome Date GA Lbr Brandon/2nd Weight Sex Delivery Anes PTL Lv   4 Current            3 Term 14 41w2d  4.321 kg (9 lb 8.4 oz) F CS-LTranv Spinal N Y      Apgar1:  9                Apgar5: 9   2 Term 13 39w5d  3.544 kg (7 lb 13 oz) M CS-Unspec      1 TAB                 Past Medical History:   Diagnosis Date    ADD (attention deficit disorder)     Anxiety     Depression     Hepatitis     Hep A,B,C    Hypertension     Migraine headache     Panic attack      Past Surgical History:   Procedure Laterality Date    APPENDECTOMY       SECTION      x1       PTA Medications   Medication Sig    prenatal vit #105-iron-FA-dha 30 mg iron- 1.4 mg-300 mg Cmpk Take by mouth.       Review of patient's allergies indicates:  No Known Allergies     Family History     Problem Relation (Age of Onset)    Hypertension Mother        Social History Main Topics    Smoking status: Former Smoker    Smokeless tobacco: Never Used    Alcohol use No    Drug use: No    Sexual activity: Yes     Partners: Male     Review of Systems   Constitutional: Positive for unexpected weight change.        7 lb weight gain since last visit according to patient   Eyes: Positive for visual disturbance.        "floaters"   Respiratory: Negative.    Cardiovascular: Positive for leg swelling.   Gastrointestinal: Positive for abdominal pain.        Epigastric and RUQ abd pain since 1330 today   Genitourinary: Negative.    Musculoskeletal: Negative.    Skin:  Negative.   Neurological: Positive for headaches.   Hematological: Negative.    Psychiatric/Behavioral: Negative.       Objective:     Vital Signs (Most " Recent):    Vital Signs (24h Range):  BP: (140)/(90) 140/90        There is no height or weight on file to calculate BMI.    FHT: 130 bpm Cat 1 (reassuring)  TOCO: none    Physical Exam:   Constitutional: She is oriented to person, place, and time. She appears well-developed and well-nourished.       Cardiovascular: Normal rate and regular rhythm.     Pulmonary/Chest: Effort normal and breath sounds normal.        Abdominal: Soft. Bowel sounds are normal.     Genitourinary: Uterus normal.               Neurological: She is alert and oriented to person, place, and time. She displays abnormal reflex.   DTR's +3    Skin: Skin is warm and dry.    Psychiatric: She has a normal mood and affect.       SVE defferred      Significant Labs:  Lab Results   Component Value Date    GROUPTRH O POS 03/14/2017    HEPBSAG Positive (A) 03/27/2017       I have personallly reviewed all pertinent lab results from the last 24 hours.

## 2017-04-25 NOTE — ASSESSMENT & PLAN NOTE
Serial BP's, labs, UDS for drug Hx   NST   Discussed POC with Dr. Mcgill.    If severe PreE, will plan for c section tonight.

## 2017-04-26 ENCOUNTER — ANESTHESIA (OUTPATIENT)
Dept: OBSTETRICS AND GYNECOLOGY | Facility: HOSPITAL | Age: 27
End: 2017-04-26
Payer: MEDICAID

## 2017-04-26 ENCOUNTER — ANESTHESIA EVENT (OUTPATIENT)
Dept: OBSTETRICS AND GYNECOLOGY | Facility: HOSPITAL | Age: 27
End: 2017-04-26
Payer: MEDICAID

## 2017-04-26 LAB
BASOPHILS # BLD AUTO: 0.01 K/UL
BASOPHILS # BLD AUTO: 0.02 K/UL
BASOPHILS NFR BLD: 0.1 %
BASOPHILS NFR BLD: 0.1 %
BLD PROD TYP BPU: NORMAL
BLD PROD TYP BPU: NORMAL
BLOOD UNIT EXPIRATION DATE: NORMAL
BLOOD UNIT EXPIRATION DATE: NORMAL
BLOOD UNIT TYPE CODE: 5100
BLOOD UNIT TYPE CODE: 5100
BLOOD UNIT TYPE: NORMAL
BLOOD UNIT TYPE: NORMAL
CODING SYSTEM: NORMAL
CODING SYSTEM: NORMAL
DIFFERENTIAL METHOD: ABNORMAL
DIFFERENTIAL METHOD: ABNORMAL
DISPENSE STATUS: NORMAL
DISPENSE STATUS: NORMAL
EOSINOPHIL # BLD AUTO: 0 K/UL
EOSINOPHIL # BLD AUTO: 0.1 K/UL
EOSINOPHIL NFR BLD: 0.4 %
EOSINOPHIL NFR BLD: 0.4 %
ERYTHROCYTE [DISTWIDTH] IN BLOOD BY AUTOMATED COUNT: 12.8 %
ERYTHROCYTE [DISTWIDTH] IN BLOOD BY AUTOMATED COUNT: 14.2 %
HCT VFR BLD AUTO: 23.6 %
HCT VFR BLD AUTO: 25.1 %
HGB BLD-MCNC: 8.5 G/DL
HGB BLD-MCNC: 8.6 G/DL
LYMPHOCYTES # BLD AUTO: 1.9 K/UL
LYMPHOCYTES # BLD AUTO: 3 K/UL
LYMPHOCYTES NFR BLD: 17.4 %
LYMPHOCYTES NFR BLD: 17.7 %
MCH RBC QN AUTO: 31.2 PG
MCH RBC QN AUTO: 31.6 PG
MCHC RBC AUTO-ENTMCNC: 34.3 %
MCHC RBC AUTO-ENTMCNC: 36 %
MCV RBC AUTO: 88 FL
MCV RBC AUTO: 91 FL
MONOCYTES # BLD AUTO: 0.6 K/UL
MONOCYTES # BLD AUTO: 1 K/UL
MONOCYTES NFR BLD: 5.3 %
MONOCYTES NFR BLD: 6 %
NEUTROPHILS # BLD AUTO: 12.9 K/UL
NEUTROPHILS # BLD AUTO: 8.2 K/UL
NEUTROPHILS NFR BLD: 76.5 %
NEUTROPHILS NFR BLD: 76.7 %
NUM UNITS TRANS PACKED RBC: NORMAL
NUM UNITS TRANS PACKED RBC: NORMAL
PLATELET # BLD AUTO: 111 K/UL
PLATELET # BLD AUTO: 160 K/UL
PMV BLD AUTO: 11.9 FL
PMV BLD AUTO: 12 FL
RBC # BLD AUTO: 2.69 M/UL
RBC # BLD AUTO: 2.76 M/UL
WBC # BLD AUTO: 10.66 K/UL
WBC # BLD AUTO: 16.99 K/UL

## 2017-04-26 PROCEDURE — 25000003 PHARM REV CODE 250: Performed by: OBSTETRICS & GYNECOLOGY

## 2017-04-26 PROCEDURE — 63600175 PHARM REV CODE 636 W HCPCS: Performed by: OBSTETRICS & GYNECOLOGY

## 2017-04-26 PROCEDURE — 25000003 PHARM REV CODE 250: Performed by: NURSE ANESTHETIST, CERTIFIED REGISTERED

## 2017-04-26 PROCEDURE — 63600175 PHARM REV CODE 636 W HCPCS: Performed by: NURSE ANESTHETIST, CERTIFIED REGISTERED

## 2017-04-26 PROCEDURE — 88305 TISSUE EXAM BY PATHOLOGIST: CPT | Performed by: PATHOLOGY

## 2017-04-26 PROCEDURE — 11000001 HC ACUTE MED/SURG PRIVATE ROOM

## 2017-04-26 PROCEDURE — 36430 TRANSFUSION BLD/BLD COMPNT: CPT

## 2017-04-26 PROCEDURE — 88305 TISSUE EXAM BY PATHOLOGIST: CPT | Mod: 26,,, | Performed by: PATHOLOGY

## 2017-04-26 PROCEDURE — 10D17ZZ EXTRACTION OF PRODUCTS OF CONCEPTION, RETAINED, VIA NATURAL OR ARTIFICIAL OPENING: ICD-10-PCS | Performed by: OBSTETRICS & GYNECOLOGY

## 2017-04-26 PROCEDURE — 36415 COLL VENOUS BLD VENIPUNCTURE: CPT

## 2017-04-26 PROCEDURE — P9016 RBC LEUKOCYTES REDUCED: HCPCS

## 2017-04-26 PROCEDURE — 85025 COMPLETE CBC W/AUTO DIFF WBC: CPT

## 2017-04-26 RX ORDER — SODIUM CHLORIDE, SODIUM LACTATE, POTASSIUM CHLORIDE, CALCIUM CHLORIDE 600; 310; 30; 20 MG/100ML; MG/100ML; MG/100ML; MG/100ML
INJECTION, SOLUTION INTRAVENOUS CONTINUOUS PRN
Status: DISCONTINUED | OUTPATIENT
Start: 2017-04-26 | End: 2017-04-26

## 2017-04-26 RX ORDER — MORPHINE SULFATE 4 MG/ML
2 INJECTION, SOLUTION INTRAMUSCULAR; INTRAVENOUS ONCE
Status: COMPLETED | OUTPATIENT
Start: 2017-04-26 | End: 2017-04-26

## 2017-04-26 RX ORDER — KETOROLAC TROMETHAMINE 30 MG/ML
30 INJECTION, SOLUTION INTRAMUSCULAR; INTRAVENOUS EVERY 6 HOURS
Status: DISCONTINUED | OUTPATIENT
Start: 2017-04-26 | End: 2017-04-27

## 2017-04-26 RX ORDER — HYDROCODONE BITARTRATE AND ACETAMINOPHEN 500; 5 MG/1; MG/1
TABLET ORAL
Status: DISCONTINUED | OUTPATIENT
Start: 2017-04-26 | End: 2017-04-28 | Stop reason: HOSPADM

## 2017-04-26 RX ORDER — MORPHINE SULFATE 2 MG/ML
2 INJECTION, SOLUTION INTRAMUSCULAR; INTRAVENOUS EVERY 4 HOURS PRN
Status: DISCONTINUED | OUTPATIENT
Start: 2017-04-26 | End: 2017-04-28 | Stop reason: HOSPADM

## 2017-04-26 RX ORDER — LIDOCAINE HCL/PF 100 MG/5ML
SYRINGE (ML) INTRAVENOUS
Status: DISCONTINUED | OUTPATIENT
Start: 2017-04-26 | End: 2017-04-26

## 2017-04-26 RX ORDER — PROPOFOL 10 MG/ML
VIAL (ML) INTRAVENOUS
Status: DISCONTINUED | OUTPATIENT
Start: 2017-04-26 | End: 2017-04-26

## 2017-04-26 RX ORDER — MISOPROSTOL 200 UG/1
800 TABLET ORAL ONCE
Status: COMPLETED | OUTPATIENT
Start: 2017-04-26 | End: 2017-04-26

## 2017-04-26 RX ORDER — MIDAZOLAM HYDROCHLORIDE 1 MG/ML
INJECTION, SOLUTION INTRAMUSCULAR; INTRAVENOUS
Status: DISCONTINUED | OUTPATIENT
Start: 2017-04-26 | End: 2017-04-26

## 2017-04-26 RX ORDER — FENTANYL CITRATE 50 UG/ML
INJECTION, SOLUTION INTRAMUSCULAR; INTRAVENOUS
Status: DISCONTINUED | OUTPATIENT
Start: 2017-04-26 | End: 2017-04-26

## 2017-04-26 RX ORDER — MORPHINE SULFATE 4 MG/ML
2 INJECTION, SOLUTION INTRAMUSCULAR; INTRAVENOUS EVERY 4 HOURS PRN
Status: DISCONTINUED | OUTPATIENT
Start: 2017-04-26 | End: 2017-04-26

## 2017-04-26 RX ADMIN — FENTANYL CITRATE 50 MCG: 50 INJECTION, SOLUTION INTRAMUSCULAR; INTRAVENOUS at 09:04

## 2017-04-26 RX ADMIN — SODIUM CHLORIDE, SODIUM LACTATE, POTASSIUM CHLORIDE, AND CALCIUM CHLORIDE: .6; .31; .03; .02 INJECTION, SOLUTION INTRAVENOUS at 11:04

## 2017-04-26 RX ADMIN — MORPHINE SULFATE 2 MG: 2 INJECTION, SOLUTION INTRAMUSCULAR; INTRAVENOUS at 05:04

## 2017-04-26 RX ADMIN — MORPHINE SULFATE 2 MG: 4 INJECTION, SOLUTION INTRAMUSCULAR; INTRAVENOUS at 12:04

## 2017-04-26 RX ADMIN — OXYCODONE HYDROCHLORIDE AND ACETAMINOPHEN 1 TABLET: 10; 325 TABLET ORAL at 03:04

## 2017-04-26 RX ADMIN — OXYCODONE HYDROCHLORIDE AND ACETAMINOPHEN 1 TABLET: 10; 325 TABLET ORAL at 08:04

## 2017-04-26 RX ADMIN — LIDOCAINE HYDROCHLORIDE 2 ML: 20 INJECTION, SOLUTION INTRAVENOUS at 09:04

## 2017-04-26 RX ADMIN — PROPOFOL 50 MG: 10 INJECTION, EMULSION INTRAVENOUS at 09:04

## 2017-04-26 RX ADMIN — OXYCODONE HYDROCHLORIDE AND ACETAMINOPHEN 1 TABLET: 10; 325 TABLET ORAL at 11:04

## 2017-04-26 RX ADMIN — SODIUM CHLORIDE, SODIUM LACTATE, POTASSIUM CHLORIDE, AND CALCIUM CHLORIDE: 600; 310; 30; 20 INJECTION, SOLUTION INTRAVENOUS at 09:04

## 2017-04-26 RX ADMIN — MIDAZOLAM 2 MG: 1 INJECTION INTRAMUSCULAR; INTRAVENOUS at 09:04

## 2017-04-26 RX ADMIN — MORPHINE SULFATE 2 MG: 2 INJECTION, SOLUTION INTRAMUSCULAR; INTRAVENOUS at 06:04

## 2017-04-26 RX ADMIN — MISOPROSTOL 800 MCG: 200 TABLET ORAL at 02:04

## 2017-04-26 RX ADMIN — KETOROLAC TROMETHAMINE 30 MG: 30 INJECTION, SOLUTION INTRAMUSCULAR at 01:04

## 2017-04-26 RX ADMIN — SODIUM CHLORIDE, SODIUM LACTATE, POTASSIUM CHLORIDE, AND CALCIUM CHLORIDE 1000 ML: .6; .31; .03; .02 INJECTION, SOLUTION INTRAVENOUS at 03:04

## 2017-04-26 RX ADMIN — KETOROLAC TROMETHAMINE 30 MG: 30 INJECTION, SOLUTION INTRAMUSCULAR at 06:04

## 2017-04-26 NOTE — PLAN OF CARE
Notified Dr. Mcgill of amount of bleeding at this time. Green pad changed x 3 with large amount of blood.  New order to admin cytotec 800 mcg po and opbtain cbc in am.  Will continue to monitor.

## 2017-04-26 NOTE — PROGRESS NOTES
Re-evaluated pt, still w/ clots in vaginal vault. Will  Proceed with exam under anesthesia secondary to postpartum hemorrhage.

## 2017-04-26 NOTE — PLAN OF CARE
Problem: Patient Care Overview  Goal: Plan of Care Review  Outcome: Ongoing (interventions implemented as appropriate)  PP repeat C/S. Some heavy/moderate bleeding during recovery with clots expressed; light bleeding after. Percocet, tylenol, and morphine given for pain. VSS. Plans to breast and formula feed. Bonding appropriately.

## 2017-04-26 NOTE — PLAN OF CARE
Problem: Patient Care Overview  Goal: Plan of Care Review  Outcome: Ongoing (interventions implemented as appropriate)  Will continue to monitor bleeding and pad changes as well as self care and care of infant.

## 2017-04-26 NOTE — OP NOTE
Section Procedure Note 17    Pre-operative Diagnosis:   1. Previous  x 2  2. Hepatitis C & B  3. History of substance abuse  4. Limited prenatal care  5. Mild pre-eclampsia    Post-operative Diagnosis: same     PROCEDURE:   repeat low transverse  section     Surgeon: Cecilia Mcgill MD     Assistants: Regulo Robb MS III    Anesthesia: Spinal anesthesia     IV Fluids: 1000mL    Estimated Blood Loss: 300mL     UOP: 100mL     Specimens: none    Complications: None     Operative findings: viable male infant vertex, Apgars 9/9, 6lbs 3oz; normal bilateral tubes/ovaries    Procedure Details   The patient was seen in the Holding Room. The risks, benefits, complications, treatment options, and expected outcomes were discussed with the patient. The patient concurred with the proposed plan, giving informed consent. The patient was taken to Operating Room, identified as Radhaliz Nieto Guilherme and the procedure verified as  Delivery.     After induction of anesthesia, the patient was draped and prepped in the usual sterile manner in the dorsal supine position. A Pfannenstiel incision was made and carried down through the subcutaneous tissue to the fascia. Fascial incision was made and extended transversely. The fascia was  from the underlying rectus tissue superiorly and inferiorly. The peritoneum was identified and entered bluntly then extended superiorly and inferiorly with good visualization of the underlying bowel and bladder.   The utero-vesical peritoneal reflection was adequately sharply dissected from the lower uterine segment. A low transverse uterine incision was made. There was clear amniotic fluid noted. The fetal vertex was delivered atraumatically, bulb suctioned on the field, then fully delivered. Delayed cord clamping performed. The placenta was simply expressed and the uterine cavity was cleared of clots and debris. The uterine incision was reapproximated with  running locked sutures of #1 chromic.    Lavage was performed and hemostasis noted. The peritoneum and rectus muscles were re-approximated with 3-0 chromic. The fascia was then reapproximated with running sutures of #1 loop PDS. The skin was reapproximated with 4-0 vicryl in a subcuticular fashion. Instrument, sponge, and needle counts were correct prior the abdominal closure and at the conclusion of the case.     Disposition: to recovery PP room     Condition: stable

## 2017-04-26 NOTE — PLAN OF CARE
Weighed all pads that have been changed since arrival.  First weight obtained was 500 ml blood loss 0345.  0445 changed pad again and weighed pad again.  Blood loss of additional 195 ml.  No9

## 2017-04-26 NOTE — ANESTHESIA PREPROCEDURE EVALUATION
04/26/2017  Radha Vanegas is a 27 y.o., female.    Pre-op Assessment    I have reviewed the Patient Summary Reports.     I have reviewed the Nursing Notes.   I have reviewed the Medications.     Review of Systems  Anesthesia Hx:  No problems with previous Anesthesia  History of prior surgery of interest to airway management or planning: Previous anesthesia: Spinal  c/s yesterday with spinal.  Denies Family Hx of Anesthesia complications.   Denies Personal Hx of Anesthesia complications.   Social:  Smoker  Tobacco Use: Active smoker Illicit Drug Use: drug use is in recovery.  Hematology/Oncology:        Hematology Comments: H&H 8/25 this am. Pt receiving first unit PRBC   Renal/:  Renal/ Normal     Hepatic/GI:   Hep b&C Hepatic/GI Symptoms: heartburn.  Esophageal / Stomach Disorders Gerd        Physical Exam  General:  Well nourished    Airway/Jaw/Neck:  Airway Findings: Tongue: Normal General Airway Assessment: Adult  Mallampati: II  Improves to II with phonation.  TM Distance: Normal, at least 6 cm      Dental:  Dental Findings: In tact        Mental Status:  Mental Status Findings:  Cooperative         Anesthesia Plan  Type of Anesthesia, risks & benefits discussed:  Anesthesia Type:  general  Patient's Preference:   Intra-op Monitoring Plan:   Intra-op Monitoring Plan Comments:   Post Op Pain Control Plan:   Post Op Pain Control Plan Comments:   Induction:    Beta Blocker:  Patient is not currently on a Beta-Blocker (No further documentation required).       Informed Consent: Patient understands risks and agrees with Anesthesia plan.  Questions answered. Anesthesia consent signed with patient.  ASA Score: 2  emergent   Day of Surgery Review of History & Physical: I have interviewed and examined the patient. I have reviewed the patient's H&P dated:  There are no significant changes.       Anesthesia Plan Notes: Pt with post partum hemmorhage. Receiving blood. 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

## 2017-04-26 NOTE — PLAN OF CARE
Left message on L and D to have current midwife Lolly Mishra call when available.  Lolly called while I was in patient's room changing pads.  Inquired through message of another [facility] nurse if she wanted to see amount of blood loss as of this point.  Through message of same [facility] nurse, Lolly declined at this time. OC Lorrie DE ANDA Rn came at this time to observe blood loss.  Instructed to continue to continue to change and monitor pads every 15 minutes.  Will continue to monitor at this time.

## 2017-04-26 NOTE — ANESTHESIA POSTPROCEDURE EVALUATION
"Anesthesia Post Evaluation    Patient: Radha Vanegas    Procedure(s) Performed: Procedure(s) (LRB):  DELIVERY- SECTION (N/A)    Final Anesthesia Type: spinal  Patient location during evaluation: labor & delivery  Patient participation: Yes- Able to Participate  Level of consciousness: awake and alert and oriented  Post-procedure vital signs: reviewed and stable  Pain management: adequate  Airway patency: patent  PONV status at discharge: No PONV  Anesthetic complications: no      Cardiovascular status: blood pressure returned to baseline  Respiratory status: unassisted, spontaneous ventilation and room air  Hydration status: euvolemic  Follow-up not needed.        Visit Vitals    /68    Pulse 65    Temp 36.6 °C (97.9 °F) (Oral)    Resp 20    Ht 5' 8" (1.727 m)    Wt 94 kg (207 lb 3.7 oz)    LMP 10/11/2016 (Approximate)    SpO2 97%    Breastfeeding No    BMI 31.51 kg/m2       Pain/Deng Score: No Data Recorded      "

## 2017-04-26 NOTE — ANESTHESIA PROCEDURE NOTES
Spinal    Diagnosis: csection  Patient location during procedure: OR  End time: 4/25/2017 9:10 PM  Staffing  Anesthesiologist: JOSE MANUEL BURRIS  Performed by: anesthesiologist   Preanesthetic Checklist  Completed: patient identified, surgical consent, pre-op evaluation, timeout performed, IV checked, risks and benefits discussed and monitors and equipment checked  Spinal Block  Patient position: sitting  Prep: Betadine  Patient monitoring: heart rate, cardiac monitor and continuous pulse ox  Approach: midline  Location: L3-4  Injection technique: single shot  CSF Fluid: clear free-flowing CSF  Needle  Needle type: pencil-tip   Needle gauge: 25 G  Needle length: 3.5 in  Additional Documentation: negative aspiration for heme  Needle localization: anatomical landmarks  Assessment  Ease of block: easy  Patient's tolerance of the procedure: comfortable throughout block and no complaints  Medications:  Bolus administered: 1.6 mL of with dextrose and 0.75 bupivacaine  Opioid administered: 150 mcg of   morphine

## 2017-04-26 NOTE — ANESTHESIA RELEASE NOTE
"Anesthesia Release from PACU Note    Patient: Radha Vanegas    Procedure(s) Performed: Procedure(s) (LRB):  DELIVERY- SECTION (N/A)    Anesthesia type: spinal    Post pain: Adequate analgesia    Post assessment: no apparent anesthetic complications and tolerated procedure well    Last Vitals:   Visit Vitals    /68    Pulse 65    Temp 36.6 °C (97.9 °F) (Oral)    Resp 20    Ht 5' 8" (1.727 m)    Wt 94 kg (207 lb 3.7 oz)    LMP 10/11/2016 (Approximate)    SpO2 97%    Breastfeeding No    BMI 31.51 kg/m2       Post vital signs: stable    Level of consciousness: awake, alert  and oriented    Nausea/Vomiting: no nausea/no vomiting    Complications: none    Airway Patency: patent    Respiratory: unassisted, spontaneous ventilation, room air    Cardiovascular: stable and blood pressure at baseline    Hydration: euvolemic  "

## 2017-04-26 NOTE — PROGRESS NOTES
Chart reviewed. Plan for repeat csection secondary to elevated BPs; abnormal LFT may be due h/o hep B & hep C. Expresses desire for permanent sterilization-will sign BT medicaid consents & schedule outpatient

## 2017-04-26 NOTE — PLAN OF CARE
Problem: Patient Care Overview  Goal: Individualization & Mutuality  Outcome: Ongoing (interventions implemented as appropriate)  Patient stable at this time. No acute distress or pain noted. Bleeding is controlled and being monitored. Safety maintained throughout shift. Will continue to monitor.

## 2017-04-26 NOTE — ANESTHESIA PREPROCEDURE EVALUATION
04/25/2017  Radha Vanegas is a 27 y.o., female.    Pre-op Assessment    I have reviewed the Patient Summary Reports.    I have reviewed the Nursing Notes.   I have reviewed the Medications.     Review of Systems  Anesthesia Hx:  History of prior surgery of interest to airway management or planning: Previous anesthesia: General, Epidural Denies Family Hx of Anesthesia complications.   Denies Personal Hx of Anesthesia complications.   Social:  Smoker, No Alcohol Use Hx of IV drug use   EENT/Dental:EENT/Dental Normal   Cardiovascular:   Hypertension    Pulmonary:  Pulmonary Normal    Hepatic/GI:   Liver Disease, Hepatitis, C Hep A non-active  Hep B inactive   Neurological:   Headaches    Endocrine:  Endocrine Normal    Psych:   depression          Physical Exam  General:  Well nourished    Airway/Jaw/Neck:  Airway Findings: Mouth Opening: Normal Tongue: Normal  General Airway Assessment: Adult  Mallampati: II  Improves to II with phonation.  TM Distance: Normal, at least 6 cm       Chest/Lungs:  Chest/Lungs Findings: Normal Respiratory Rate     Heart/Vascular:  Heart Findings: Rate: Normal        Mental Status:  Mental Status Findings:  Cooperative, Alert and Oriented         Anesthesia Plan  Type of Anesthesia, risks & benefits discussed:  Anesthesia Type:  spinal  Patient's Preference:   Intra-op Monitoring Plan:   Intra-op Monitoring Plan Comments:   Post Op Pain Control Plan:   Post Op Pain Control Plan Comments:   Induction:    Beta Blocker:  Patient is not currently on a Beta-Blocker (No further documentation required).       Informed Consent: Patient understands risks and agrees with Anesthesia plan.  Questions answered. Anesthesia consent signed with patient.  ASA Score: 2     Day of Surgery Review of History & Physical: I have interviewed and examined the patient. I have reviewed the patient's H&P  dated:  There are no significant changes.

## 2017-04-26 NOTE — PLAN OF CARE
Problem: Patient Care Overview  Goal: Plan of Care Review  Outcome: Ongoing (interventions implemented as appropriate)  Plan of care discussed with pt. Pt tolerating pain with po meds. Pt tolerating po fluid and and crackers. Pt bleeding is scant to none. VSS, will continue to monitor.

## 2017-04-26 NOTE — LACTATION NOTE
Lactation Rounds:    Nurse reports mother in a lot of pain had D&C and infant getting formula. Rounds made on patient. Patient reports maybe tomorrow we can work on latch and position and is not feeling up to it right now. Support offered. Will follow up tomorrow.

## 2017-04-26 NOTE — PHYSICIAN QUERY
"PT Name: Radha Vanegas  MR #: [id number]    Physician Query Form - Hematology Clarification      CDS/: Samantha Wharton RN-BSN              Contact information:[phone]    This form is a permanent document in the medical record.      Query Date: 2017    By submitting this query, we are merely seeking further clarification of documentation. Please utilize your independent clinical judgment when addressing the question(s) below.    The Medical record contains the following:   Indicators  Supporting Clinical Findings Location in Medical Record    "Anemia" documented     X H & H = Hgb=8.6-11.9  Hct=25.1-34.6 LAB -    BP =                     HR=      "GI bleeding" documented     X Acute bleeding (Non GI site) "1500cc clot extracted from vaginal vault"    "Pt with persistent vaginal bleeding since csection. H/H has dropped, vital signs have been normal throughout night."    "still w/ clots in vaginal vault. Will Proceed with exam under anesthesia secondary to postpartum hemorrhage. "    "Estimated Blood Loss: 300mL" OB Progress note @0810                  OB Progress note @0839          OP note    X Transfusion(s) Transfuse RBC 2 Units Orders    X Treatment: misoprostol tablet 800 mcg  MAR     X Other:  "repeat low transverse  section " OP note      Provider, please specify diagnosis or diagnoses associated with above clinical findings.    [  ] Acute blood loss anemia expected post-operatively  [ x ] Acute blood loss anemia  [  ] Other Hematological Diagnosis (please specify): _________________________________  [  ] Clinically Undetermined       Please document in your progress notes daily for the duration of treatment, until resolved, and include in your discharge summary.                                                                                                      "

## 2017-04-26 NOTE — TRANSFER OF CARE
"Anesthesia Transfer of Care Note    Patient: Radha Vanegas    Procedure(s) Performed: Procedure(s) (LRB):  DELIVERY- SECTION (N/A)    Patient location: Labor and Delivery    Anesthesia Type: spinal    Transport from OR: Transported from OR on room air with adequate spontaneous ventilation    Post pain: adequate analgesia    Post assessment: no apparent anesthetic complications    Post vital signs: stable    Level of consciousness: awake, alert and oriented    Nausea/Vomiting: no nausea/vomiting    Complications: none          Last vitals:   Visit Vitals    /68    Pulse 65    Temp 36.6 °C (97.9 °F) (Oral)    Resp 20    Ht 5' 8" (1.727 m)    Wt 94 kg (207 lb 3.7 oz)    LMP 10/11/2016 (Approximate)    SpO2 97%    Breastfeeding No    BMI 31.51 kg/m2     "

## 2017-04-26 NOTE — PLAN OF CARE
Notified Dr. morillo of total 795ml blood loss in 3hrs.  No new orders noted at this time.  Will continue to monitor pads.

## 2017-04-26 NOTE — PROCEDURES
DATE OF PROCEDURE: 4/26/17                                                                                                                         PREOPERATIVE DIAGNOSIS:      1. Postpartum hemorrhage s/p repeat csection    2. H/o substance abuse with poor pain control                                                                                 POSTOPERATIVE DIAGNOSIS:      1. Same as above                                                                                                         PROCEDURE: exam under anesthesia, uterine curettage; 2 units of PRBCs transfused pre- & intra-oepratively                                              SURGEON:  Cecilia Mcgill M.D.                                                                                                                               ANESTHESIA:  MAC anesthesia.                                                                                                             IV FLUIDS: 600 mL                                                                                                                                    URINE OUTPUT:  50 mL.                                                                                                                                     ESTIMATED BLOOD LOSS:  400 mL.                                                                                                                                                                                                                                COMPLICATIONS: anterior cervical laceration repaired with 3-0 chromic                                                                                                                                    SPECIMENS:  Blood clots, possible products of conception                                                                                                                     OPERATIVE FINDINGS:  Uterine fundus firm, approximately 400cc blood clots  evacuated from vaginal vault & lower uterine segment                                                                                 PROCEDURE IN DETAIL:  After informed consent, the patient was brought to     the Operating Room. The patient, Radha Vanegas, was brought to the Operating Room.    A time out was performed and the procedure was confirmed as a exam under anesthesia with uterine curettage. She was placed under MAC anesthesia without difficulty and placed in dorsal             lithotomy position, prepped and draped in sterile fashion.  We placed a weighted       speculum in the vagina.  Anterior lip of the cervix was grasped with         single-tooth tenaculum. Sharp curettage was performed throughout the endometrial cavity. Possible scant membranes seen, mostly blood clots cleared from lower uterine segment.                                                                            The suction was then inserted to remove any clots and        debris. There was minimal bleeding at the end of the case.  Anterior lip of cervix laceration repaired. All sites were noted to be hemostatic.  Again, all sites were noted to be hemostatic after the case.      All instruments were removed from the vagina.  The patient tolerated the     procedure.  All lap counts were correct.

## 2017-04-26 NOTE — PROGRESS NOTES
POD#1  Radha Vanegas 27 y.o.  s/p repeat csection. Pt with persistent vaginal bleeding since csection. H/H has dropped, vital signs have been normal throughout night.s/p cytotec 800mcg po @ 230am. Pt w/ history of substance abuse, has poor pain control at this time.    Vitals:    17 0104 17 0118 17 0200 17 0400   BP: 117/79 (!) 133/91 139/89 (!) 137/95   Pulse: 64 63 73 (!) 123   Resp:   18 18   Temp:   98 °F (36.7 °C) 97.7 °F (36.5 °C)   TempSrc:   Oral Oral   SpO2:       Weight:       Height:         PE:  GENERAL: NAD, A&O  CHEST: normal effort  ABDOMEN: soft, fundus firm, bandage c/d/i  : 1500cc clot extracted from vaginal vault. Difficult for full uterine sweep/pelvic exam due to pt discomfort as well as narrow pelvis    A/P  1. Will re-eval-if persistent bleeding, will proceed to exam under anesthesia  2. Type & crossmatch x 2 units

## 2017-04-26 NOTE — PROGRESS NOTES
Pt transferred to OR at 0930, pt arrived in OR at 0931. Dr. Mcgill in OR at 0939. All staff participated in Time Out at 0943. Pt was prepped at 0947. Counts of 10 sponges [facility]. Case started at 0950. 1 laceration on anterior cervix repaired with 3.0 chromic. 1 needle accounted for. Counts verified at end of case to be correct. 1010 case finish. Pt in PACU at 1028.

## 2017-04-27 PROBLEM — D62 ACUTE BLOOD LOSS ANEMIA: Status: ACTIVE | Noted: 2017-04-27

## 2017-04-27 LAB
BASOPHILS # BLD AUTO: 0.01 K/UL
BASOPHILS NFR BLD: 0.1 %
DIFFERENTIAL METHOD: ABNORMAL
EOSINOPHIL # BLD AUTO: 0.1 K/UL
EOSINOPHIL NFR BLD: 1.3 %
ERYTHROCYTE [DISTWIDTH] IN BLOOD BY AUTOMATED COUNT: 14.7 %
HCT VFR BLD AUTO: 21.1 %
HGB BLD-MCNC: 7.5 G/DL
LYMPHOCYTES # BLD AUTO: 1.7 K/UL
LYMPHOCYTES NFR BLD: 23.8 %
MCH RBC QN AUTO: 31.4 PG
MCHC RBC AUTO-ENTMCNC: 35.5 %
MCV RBC AUTO: 88 FL
MONOCYTES # BLD AUTO: 0.4 K/UL
MONOCYTES NFR BLD: 5.3 %
NEUTROPHILS # BLD AUTO: 4.8 K/UL
NEUTROPHILS NFR BLD: 69.5 %
PLATELET # BLD AUTO: 104 K/UL
PMV BLD AUTO: 11.2 FL
RBC # BLD AUTO: 2.39 M/UL
WBC # BLD AUTO: 6.93 K/UL

## 2017-04-27 PROCEDURE — 36415 COLL VENOUS BLD VENIPUNCTURE: CPT

## 2017-04-27 PROCEDURE — 25000003 PHARM REV CODE 250: Performed by: OBSTETRICS & GYNECOLOGY

## 2017-04-27 PROCEDURE — 11000001 HC ACUTE MED/SURG PRIVATE ROOM

## 2017-04-27 PROCEDURE — 85025 COMPLETE CBC W/AUTO DIFF WBC: CPT

## 2017-04-27 PROCEDURE — 63600175 PHARM REV CODE 636 W HCPCS: Performed by: OBSTETRICS & GYNECOLOGY

## 2017-04-27 RX ORDER — IBUPROFEN 600 MG/1
600 TABLET ORAL 3 TIMES DAILY
Status: COMPLETED | OUTPATIENT
Start: 2017-04-27 | End: 2017-04-28

## 2017-04-27 RX ORDER — FERROUS SULFATE 325(65) MG
325 TABLET, DELAYED RELEASE (ENTERIC COATED) ORAL 2 TIMES DAILY
Status: DISCONTINUED | OUTPATIENT
Start: 2017-04-27 | End: 2017-04-28 | Stop reason: HOSPADM

## 2017-04-27 RX ADMIN — IBUPROFEN 600 MG: 600 TABLET, FILM COATED ORAL at 09:04

## 2017-04-27 RX ADMIN — OXYCODONE HYDROCHLORIDE AND ACETAMINOPHEN 1 TABLET: 10; 325 TABLET ORAL at 01:04

## 2017-04-27 RX ADMIN — OXYCODONE HYDROCHLORIDE AND ACETAMINOPHEN 1 TABLET: 10; 325 TABLET ORAL at 09:04

## 2017-04-27 RX ADMIN — STANDARDIZED SENNA CONCENTRATE AND DOCUSATE SODIUM 1 TABLET: 8.6; 5 TABLET, FILM COATED ORAL at 05:04

## 2017-04-27 RX ADMIN — FERROUS SULFATE TAB EC 325 MG (65 MG FE EQUIVALENT) 325 MG: 325 (65 FE) TABLET DELAYED RESPONSE at 09:04

## 2017-04-27 RX ADMIN — OXYCODONE HYDROCHLORIDE AND ACETAMINOPHEN 1 TABLET: 10; 325 TABLET ORAL at 08:04

## 2017-04-27 RX ADMIN — KETOROLAC TROMETHAMINE 30 MG: 30 INJECTION, SOLUTION INTRAMUSCULAR at 12:04

## 2017-04-27 RX ADMIN — IBUPROFEN 600 MG: 600 TABLET, FILM COATED ORAL at 01:04

## 2017-04-27 RX ADMIN — OXYCODONE HYDROCHLORIDE AND ACETAMINOPHEN 1 TABLET: 10; 325 TABLET ORAL at 04:04

## 2017-04-27 RX ADMIN — OXYCODONE HYDROCHLORIDE AND ACETAMINOPHEN 1 TABLET: 10; 325 TABLET ORAL at 05:04

## 2017-04-27 NOTE — PLAN OF CARE
Problem: Patient Care Overview  Goal: Plan of Care Review  Outcome: Ongoing (interventions implemented as appropriate)  Use of IV Toradol and PO Percocet-210 for pain management throughout night. Bleeding light throughout shift. Butler emptying bladder without difficulty. SCDs in place. IV infiltrated and removed towards end of shift. Aquacel dressing with small amount of dried, marked drainage. Formula feeding infant well. Bonding with infant appropriately.

## 2017-04-27 NOTE — PLAN OF CARE
Problem: Patient Care Overview  Goal: Plan of Care Review  Outcome: Ongoing (interventions implemented as appropriate)  Patient stable at this time. No acute distress or pain noted. Breast and formula feeding at this time. Bonding well with infant. Safety maintained throughout shift. Will continue to monitor.

## 2017-04-27 NOTE — ASSESSMENT & PLAN NOTE
POD#1 s/p exam under anesthesia with uterine curettage.  No heavy bleeding currently.  Continue to monitor.

## 2017-04-27 NOTE — ASSESSMENT & PLAN NOTE
S/p 2 units PRBC's yesterday.  Hb/Hct 7/21.  Pt currently asymptomatic, but will see how she does once she ambulates.  No current heavy bleeding.  Start iron and repeat CBC tomorrow AM.

## 2017-04-27 NOTE — ANESTHESIA RELEASE NOTE
"Anesthesia Release from PACU Note    Patient: Radha Vanegas    Procedure(s) Performed: * No procedures listed *    Anesthesia type: MAC    Post pain: Adequate analgesia    Post assessment: no apparent anesthetic complications    Last Vitals:   Visit Vitals    /64    Pulse 80    Temp 36.7 °C (98 °F) (Oral)    Resp 20    Ht 5' 8" (1.727 m)    Wt 94 kg (207 lb 3.7 oz)    LMP 10/11/2016 (Approximate)    SpO2 99%    Breastfeeding Unknown    BMI 31.51 kg/m2       Post vital signs: stable    Level of consciousness: awake    Nausea/Vomiting: no nausea/no vomiting    Complications: none    Airway Patency: patent    Respiratory: unassisted    Cardiovascular: stable and blood pressure at baseline    Hydration: euvolemic  "

## 2017-04-27 NOTE — SUBJECTIVE & OBJECTIVE
Interval History:  Radha Vanegas is a 27 y.o. female status post Repeat  section on 17 09:34 PM  at 38w2d, and is POD#1 s/p EUA with uterine curettage for PP hemorrhage. Patient is doing well today. She denies nausea, vomiting, fever or chills.  Patient reports mild abdominal pain that is well relieved by oral pain medications. Vaginal bleeding is light.  Butler was just removed this AM, and she has not gotten out of bed yet.  Denies any chest pain, SOB, palpitations, weakness, or dizziness in bed.    Patient is breastfeeding. She desires circumcision for her male baby: yes.     Objective:     Vital Signs (Most Recent):  Temp: 98.3 °F (36.8 °C) (17 0400)  Pulse: 85 (17 0400)  Resp: 20 (17 0400)  BP: 128/72 (17 0400)  SpO2: 99 % (17 1134) Vital Signs (24h Range):  Temp:  [98 °F (36.7 °C)-98.8 °F (37.1 °C)] 98.3 °F (36.8 °C)  Pulse:  [63-88] 85  Resp:  [17-20] 20  SpO2:  [91 %-100 %] 99 %  BP: (105-128)/(61-77) 128/72     Weight: 94 kg (207 lb 3.7 oz)  Body mass index is 31.51 kg/(m^2).      Intake/Output Summary (Last 24 hours) at 17 0908  Last data filed at 17 0400   Gross per 24 hour   Intake             1270 ml   Output             3325 ml   Net            -2055 ml       Significant Labs:  Lab Results   Component Value Date    GROUPTRH O POS 2017    HEPBSAG Positive (A) 2017       Recent Labs  Lab 17  0743   HGB 7.5*   HCT 21.1*       CBC:   Recent Labs  Lab 17  0743   WBC 6.93   RBC 2.39*   HGB 7.5*   HCT 21.1*   *   MCV 88   MCH 31.4*   MCHC 35.5       Physical Exam:   Constitutional: She is oriented to person, place, and time. She appears well-developed and well-nourished. No distress.    HENT:   Head: Normocephalic and atraumatic.       Pulmonary/Chest: Effort normal.        Abdominal: Soft. She exhibits abdominal incision (Aquacel dressing intact with small amount of drainage beneath it). She exhibits no distension and  no mass. There is no tenderness. There is no rebound and no guarding.   Fundus firm, below umbilicus; normal lochia; no heavy vaginal bleeding             Musculoskeletal: She exhibits no edema.       Neurological: She is alert and oriented to person, place, and time.    Skin: No rash noted. No erythema. No pallor.    Psychiatric: She has a normal mood and affect. Her behavior is normal. Judgment and thought content normal.

## 2017-04-27 NOTE — LACTATION NOTE
Lactation rounds  Mother states that breastfeeding is going well; Lactation packet given and admit information reviewed. Mother verbalizes understanding of expected  behaviors and output for the first 48 hours of life.  Discussed the importance of cue based feedings on demand, unrestricted access to the breast, and frequent uninterrupted skin to skin contact.  Risk and implications of artificial nipples and supplementation discussed.  Encouraged mother to call for assistance when desired or when infant is showing signs of hunger, contact number provided, mother verbalizes understanding.

## 2017-04-27 NOTE — ANESTHESIA POSTPROCEDURE EVALUATION
"Anesthesia Post Evaluation    Patient: Radha Vanegas    Procedure(s) Performed: * No procedures listed *    Final Anesthesia Type: MAC  Patient location during evaluation: labor & delivery  Patient participation: Yes- Able to Participate  Level of consciousness: awake and alert  Post-procedure vital signs: reviewed and stable  Pain management: adequate  Airway patency: patent  PONV status at discharge: No PONV  Anesthetic complications: no      Cardiovascular status: blood pressure returned to baseline  Respiratory status: unassisted  Hydration status: euvolemic  Follow-up not needed.        Visit Vitals    /64    Pulse 80    Temp 36.7 °C (98 °F) (Oral)    Resp 20    Ht 5' 8" (1.727 m)    Wt 94 kg (207 lb 3.7 oz)    LMP 10/11/2016 (Approximate)    SpO2 99%    Breastfeeding Unknown    BMI 31.51 kg/m2       Pain/Deng Score: Pain Rating Prior to Med Admin: 6 (4/26/2017  8:30 PM)  Pain Rating Post Med Admin: 5 (4/26/2017  4:30 PM)  Deng Score: 10 (4/25/2017 11:00 PM)      "

## 2017-04-27 NOTE — TRANSFER OF CARE
"Anesthesia Transfer of Care Note    Patient: Radha Vanegas    Procedure(s) Performed: * No procedures listed *    Patient location: Labor and Delivery    Anesthesia Type: MAC    Transport from OR: Transported from OR on room air with adequate spontaneous ventilation    Post pain: adequate analgesia    Post assessment: no apparent anesthetic complications    Post vital signs: stable    Level of consciousness: awake, alert and oriented    Complications: none          Last vitals:   Visit Vitals    /64    Pulse 80    Temp 36.7 °C (98 °F) (Oral)    Resp 20    Ht 5' 8" (1.727 m)    Wt 94 kg (207 lb 3.7 oz)    LMP 10/11/2016 (Approximate)    SpO2 99%    Breastfeeding Unknown    BMI 31.51 kg/m2     "

## 2017-04-27 NOTE — PROGRESS NOTES
[facility] -   Obstetrics  Postpartum Progress Note    Patient Name: Radha Vanegas  MRN: [id number]  Admission Date: 2017  Hospital Length of Stay: 2 days  Attending Physician: Cecilia Mcglil MD  Primary Care Provider: Anika Johnston MD    Subjective:     Principal Problem:Mild preeclampsia    Hospital Course:  17:  Underwent repeat LTCS for hx of C/S x 2, mild preeclampsia.  Postpartum course complicated by PP hemorrhage.  17:  Underwent exam under anesthesia with uterine curettage for PP hemorrhage.  Received 2 units PRBC's.  17:  Routine post-op advances.  Hb/Hct .  Iron supplementation started.       Interval History:  Radha Vanegas is a 27 y.o. female status post Repeat  section on 17 09:34 PM  at 38w2d, and is POD#1 s/p EUA with uterine curettage for PP hemorrhage. Patient is doing well today. She denies nausea, vomiting, fever or chills.  Patient reports mild abdominal pain that is well relieved by oral pain medications. Vaginal bleeding is light.  Butler was just removed this AM, and she has not gotten out of bed yet.  Denies any chest pain, SOB, palpitations, weakness, or dizziness in bed.    Patient is breastfeeding. She desires circumcision for her male baby: yes.     Objective:     Vital Signs (Most Recent):  Temp: 98.3 °F (36.8 °C) (17 0400)  Pulse: 85 (17 0400)  Resp: 20 (17 0400)  BP: 128/72 (17 0400)  SpO2: 99 % (17 1134) Vital Signs (24h Range):  Temp:  [98 °F (36.7 °C)-98.8 °F (37.1 °C)] 98.3 °F (36.8 °C)  Pulse:  [63-88] 85  Resp:  [17-20] 20  SpO2:  [91 %-100 %] 99 %  BP: (105-128)/(61-77) 128/72     Weight: 94 kg (207 lb 3.7 oz)  Body mass index is 31.51 kg/(m^2).      Intake/Output Summary (Last 24 hours) at 17 0908  Last data filed at 17 0400   Gross per 24 hour   Intake             1270 ml   Output             3325 ml   Net            -2055 ml       Significant Labs:  Lab Results   Component  Value Date    GROUPTRH O POS 2017    HEPBSAG Positive (A) 2017       Recent Labs  Lab 17  0743   HGB 7.5*   HCT 21.1*       CBC:   Recent Labs  Lab 17  0743   WBC 6.93   RBC 2.39*   HGB 7.5*   HCT 21.1*   *   MCV 88   MCH 31.4*   MCHC 35.5       Physical Exam:   Constitutional: She is oriented to person, place, and time. She appears well-developed and well-nourished. No distress.    HENT:   Head: Normocephalic and atraumatic.       Pulmonary/Chest: Effort normal.        Abdominal: Soft. She exhibits abdominal incision (Aquacel dressing intact with small amount of drainage beneath it). She exhibits no distension and no mass. There is no tenderness. There is no rebound and no guarding.   Fundus firm, below umbilicus; normal lochia; no heavy vaginal bleeding             Musculoskeletal: She exhibits no edema.       Neurological: She is alert and oriented to person, place, and time.    Skin: No rash noted. No erythema. No pallor.    Psychiatric: She has a normal mood and affect. Her behavior is normal. Judgment and thought content normal.       Assessment/Plan:     27 y.o. female  at 38w2d for:    * Mild preeclampsia  BP currently normal and pt asymptomatic.  Continue close surveillance.    Status post repeat low transverse  section  POD#2 s/p repeat LTCS.  Butler removed today.  Routine post-op care    Third-stage postpartum hemorrhage  POD#1 s/p exam under anesthesia with uterine curettage.  No heavy bleeding currently.  Continue to monitor.    Acute blood loss anemia  S/p 2 units PRBC's yesterday.  Hb/Hct .  Pt currently asymptomatic, but will see how she does once she ambulates.  No current heavy bleeding.  Start iron and repeat CBC tomorrow AM.      Disposition: As patient meets milestones, will plan to discharge tomorrow.    Aliza Valera MD  Obstetrics  [facility] -

## 2017-04-28 VITALS
BODY MASS INDEX: 31.41 KG/M2 | WEIGHT: 207.25 LBS | SYSTOLIC BLOOD PRESSURE: 123 MMHG | OXYGEN SATURATION: 99 % | HEART RATE: 82 BPM | HEIGHT: 68 IN | TEMPERATURE: 98 F | DIASTOLIC BLOOD PRESSURE: 86 MMHG | RESPIRATION RATE: 18 BRPM

## 2017-04-28 LAB
BASOPHILS # BLD AUTO: 0.01 K/UL
BASOPHILS NFR BLD: 0.1 %
DIFFERENTIAL METHOD: ABNORMAL
EOSINOPHIL # BLD AUTO: 0.1 K/UL
EOSINOPHIL NFR BLD: 2 %
ERYTHROCYTE [DISTWIDTH] IN BLOOD BY AUTOMATED COUNT: 14.4 %
HCT VFR BLD AUTO: 20.6 %
HGB BLD-MCNC: 7.2 G/DL
LYMPHOCYTES # BLD AUTO: 1.8 K/UL
LYMPHOCYTES NFR BLD: 25.4 %
MCH RBC QN AUTO: 31.4 PG
MCHC RBC AUTO-ENTMCNC: 35 %
MCV RBC AUTO: 90 FL
MONOCYTES # BLD AUTO: 0.4 K/UL
MONOCYTES NFR BLD: 5.6 %
NEUTROPHILS # BLD AUTO: 4.8 K/UL
NEUTROPHILS NFR BLD: 66.9 %
PLATELET # BLD AUTO: 138 K/UL
PMV BLD AUTO: 11.4 FL
RBC # BLD AUTO: 2.29 M/UL
WBC # BLD AUTO: 7.13 K/UL

## 2017-04-28 PROCEDURE — 3E0234Z INTRODUCTION OF SERUM, TOXOID AND VACCINE INTO MUSCLE, PERCUTANEOUS APPROACH: ICD-10-PCS | Performed by: OBSTETRICS & GYNECOLOGY

## 2017-04-28 PROCEDURE — 90471 IMMUNIZATION ADMIN: CPT | Performed by: OBSTETRICS & GYNECOLOGY

## 2017-04-28 PROCEDURE — 90715 TDAP VACCINE 7 YRS/> IM: CPT | Performed by: OBSTETRICS & GYNECOLOGY

## 2017-04-28 PROCEDURE — 25000003 PHARM REV CODE 250: Performed by: OBSTETRICS & GYNECOLOGY

## 2017-04-28 PROCEDURE — 63600175 PHARM REV CODE 636 W HCPCS: Performed by: OBSTETRICS & GYNECOLOGY

## 2017-04-28 PROCEDURE — 85025 COMPLETE CBC W/AUTO DIFF WBC: CPT

## 2017-04-28 PROCEDURE — 36415 COLL VENOUS BLD VENIPUNCTURE: CPT

## 2017-04-28 RX ORDER — IBUPROFEN 600 MG/1
600 TABLET ORAL EVERY 6 HOURS PRN
Qty: 30 TABLET | Refills: 1 | Status: SHIPPED | OUTPATIENT
Start: 2017-04-28 | End: 2018-04-28

## 2017-04-28 RX ORDER — OXYCODONE AND ACETAMINOPHEN 5; 325 MG/1; MG/1
1 TABLET ORAL EVERY 4 HOURS PRN
Qty: 30 TABLET | Refills: 0 | Status: SHIPPED | OUTPATIENT
Start: 2017-04-28 | End: 2017-12-15

## 2017-04-28 RX ADMIN — OXYCODONE HYDROCHLORIDE AND ACETAMINOPHEN 1 TABLET: 10; 325 TABLET ORAL at 09:04

## 2017-04-28 RX ADMIN — CLOSTRIDIUM TETANI TOXOID ANTIGEN (FORMALDEHYDE INACTIVATED), CORYNEBACTERIUM DIPHTHERIAE TOXOID ANTIGEN (FORMALDEHYDE INACTIVATED), BORDETELLA PERTUSSIS TOXOID ANTIGEN (GLUTARALDEHYDE INACTIVATED), BORDETELLA PERTUSSIS FILAMENTOUS HEMAGGLUTININ ANTIGEN (FORMALDEHYDE INACTIVATED), BORDETELLA PERTUSSIS PERTACTIN ANTIGEN, AND BORDETELLA PERTUSSIS FIMBRIAE 2/3 ANTIGEN 0.5 ML: 5; 2; 2.5; 5; 3; 5 INJECTION, SUSPENSION INTRAMUSCULAR at 09:04

## 2017-04-28 RX ADMIN — FERROUS SULFATE TAB EC 325 MG (65 MG FE EQUIVALENT) 325 MG: 325 (65 FE) TABLET DELAYED RESPONSE at 09:04

## 2017-04-28 RX ADMIN — IBUPROFEN 600 MG: 600 TABLET, FILM COATED ORAL at 06:04

## 2017-04-28 RX ADMIN — OXYCODONE HYDROCHLORIDE AND ACETAMINOPHEN 1 TABLET: 10; 325 TABLET ORAL at 04:04

## 2017-04-28 NOTE — PLAN OF CARE
Problem: Patient Care Overview  Goal: Plan of Care Review  Outcome: Ongoing (interventions implemented as appropriate)  Pt progressing, bonding well with baby boy. VSS. Breast and formula feeding at this time. No issues noted. Will continue to monitor progress.

## 2017-04-28 NOTE — DISCHARGE INSTRUCTIONS
"Mother Self Care:    Activity: Avoid strenuous exercise and get adequate rest.  No driving until the physician consent given.  Emotional Changes: Most women find birth to be a time of great emotional upheaval.  Sense of loss, mood swings, fatigue, anxiety, and feeling "let down" are common.  If feelings worsen or last more than a week, call your physician.  Breast Care/Breastfeeding: Wear a bra for comfort.  Keep nipples dry and apply your own breast milk or lanolin cream as needed for soreness.  Engorgement can be relieved with warm, moist heat before feedings.  You may also take Ibuprofen.  Breast Care/Bottle Feeding: Wear support bra 24 hours a day for one week.  Avoid stimulation to breasts.  You may use ice packs for discomfort.  Petr-Care/Vaginal Bleeding: Remember to use your petr-bottle after urinating.  Your flow will change from red, to pink, to yellow/white color over a period of 2 weeks.  Menstruation will return in 3-8 weeks, or longer if breastfeeding.  Episiotomy Vaginal Delivery: Stitches will dissolve within 10 days to 3 weeks.  Warm baths, tucks, and dermoplast will promote healing.  Avoid bubble baths or strong soaps.   Section/Tubal Ligation: Keep incision clean and dry.  Please remove steri-strips in 5-7 days.  You may shower, but avoid baths.  Sexual Activity/Pelvic Rest: No sexual activity, tampons, or douching until your physician gives you consent.  Diet: Continue to eat from the five basic food groups, including plenty of protein, fruits, vegetables, and whole grains.  Limit empty calories and high fat foods.  Drink enough fluids to satisfy thirst and add an extra 500 calories for breastfeeding.  Constipation/Hemorrhoids: Drink plenty of water.  You may take a stool softener or natural laxative (Metamucil). You may use tucks or hemorrhoid ointment and soak in a warm tub.    CALL YOUR OB DOCTOR IF ANY OF THE FOLLOWING OCCURS:  *Heavy bleeding - saturating a pad an hour or passing any " large (2-3 inches in size) blood clots.  *Any pain, redness, or tenderness in lower leg.  *You cannot care for yourself or your baby.  *Any signs of infection-      - Temperature greater than 100.5 degrees F      - Foul smelling vaginal discharge and/or incisional drainage      - Increased episiotomy or incisional pain      - Hot, hard, red or sore area on breast      - Flu-like symptoms      - Any urgency, frequency or burning with urination

## 2017-04-28 NOTE — CONSULTS
"Met with pt today per consult. Pt saw NAVEEN Salinas for prenatal care. Spoke to NAVEEN Salinas about pt's history and also received report from RN today. Pt has been successful with treatment and drug screens have been negative.     Pt states when she found out she was pregnant again and still could not stop using drugs, she turned herself into [facility] long-term. Pt voluntarily joined drug court and is in phase I still. She has to submit to random drug screens and go to group counseling 3 times a week and also 12-step counseling 3 times a week. Pt will take a 2 week maternity leave from these groups, and then plans to return. She will have to complete about 16 more months to be finished with treatment/drug court.    Pt states she is determined and has so much more support this time, because others see how well she is doing and "can now stand to be around" her. Pt is very proud of herself. She recently received joint custody of her daughter, and states the father is very proud of her, too.     Pt with no needs at this time. Encouraged pt to contact MSW if any needs arise.   "

## 2017-05-01 ENCOUNTER — HOSPITAL ENCOUNTER (INPATIENT)
Facility: HOSPITAL | Age: 27
LOS: 2 days | Discharge: HOME | DRG: 776 | End: 2017-05-03
Attending: EMERGENCY MEDICINE | Admitting: OBSTETRICS & GYNECOLOGY
Payer: MEDICAID

## 2017-05-01 DIAGNOSIS — I10 HYPERTENSION: ICD-10-CM

## 2017-05-01 DIAGNOSIS — O14.90 PREECLAMPSIA, UNSPECIFIED TRIMESTER: Primary | ICD-10-CM

## 2017-05-01 DIAGNOSIS — O14.90 PREECLAMPSIA: ICD-10-CM

## 2017-05-01 LAB
ALBUMIN SERPL BCP-MCNC: 2.7 G/DL
ALP SERPL-CCNC: 91 U/L
ALT SERPL W/O P-5'-P-CCNC: 30 U/L
ANION GAP SERPL CALC-SCNC: 9 MMOL/L
AST SERPL-CCNC: 32 U/L
BACTERIA #/AREA URNS HPF: ABNORMAL /HPF
BASOPHILS # BLD AUTO: 0.02 K/UL
BASOPHILS NFR BLD: 0.2 %
BILIRUB SERPL-MCNC: 0.4 MG/DL
BILIRUB UR QL STRIP: NEGATIVE
BUN SERPL-MCNC: 13 MG/DL
CALCIUM SERPL-MCNC: 8.4 MG/DL
CHLORIDE SERPL-SCNC: 113 MMOL/L
CLARITY UR: CLEAR
CO2 SERPL-SCNC: 20 MMOL/L
COLOR UR: YELLOW
CREAT SERPL-MCNC: 0.8 MG/DL
DIFFERENTIAL METHOD: ABNORMAL
EOSINOPHIL # BLD AUTO: 0.1 K/UL
EOSINOPHIL NFR BLD: 1.5 %
ERYTHROCYTE [DISTWIDTH] IN BLOOD BY AUTOMATED COUNT: 15.2 %
EST. GFR  (AFRICAN AMERICAN): >60 ML/MIN/1.73 M^2
EST. GFR  (NON AFRICAN AMERICAN): >60 ML/MIN/1.73 M^2
GLUCOSE SERPL-MCNC: 81 MG/DL
GLUCOSE UR QL STRIP: NEGATIVE
HCT VFR BLD AUTO: 24.6 %
HGB BLD-MCNC: 8.2 G/DL
HGB UR QL STRIP: ABNORMAL
KETONES UR QL STRIP: NEGATIVE
LEUKOCYTE ESTERASE UR QL STRIP: ABNORMAL
LYMPHOCYTES # BLD AUTO: 1.6 K/UL
LYMPHOCYTES NFR BLD: 18.5 %
MCH RBC QN AUTO: 30.6 PG
MCHC RBC AUTO-ENTMCNC: 33.3 %
MCV RBC AUTO: 92 FL
MICROSCOPIC COMMENT: ABNORMAL
MONOCYTES # BLD AUTO: 0.5 K/UL
MONOCYTES NFR BLD: 6.1 %
NEUTROPHILS # BLD AUTO: 6.3 K/UL
NEUTROPHILS NFR BLD: 73.7 %
NITRITE UR QL STRIP: NEGATIVE
PH UR STRIP: 6 [PH] (ref 5–8)
PLATELET # BLD AUTO: 258 K/UL
PMV BLD AUTO: 10.6 FL
POTASSIUM SERPL-SCNC: 4.2 MMOL/L
PROT SERPL-MCNC: 6.6 G/DL
PROT UR QL STRIP: NEGATIVE
RBC # BLD AUTO: 2.68 M/UL
RBC #/AREA URNS HPF: 3 /HPF (ref 0–4)
SODIUM SERPL-SCNC: 142 MMOL/L
SP GR UR STRIP: <=1.005 (ref 1–1.03)
URN SPEC COLLECT METH UR: ABNORMAL
UROBILINOGEN UR STRIP-ACNC: NEGATIVE EU/DL
WBC # BLD AUTO: 8.55 K/UL
WBC #/AREA URNS HPF: 10 /HPF (ref 0–5)

## 2017-05-01 PROCEDURE — 81000 URINALYSIS NONAUTO W/SCOPE: CPT

## 2017-05-01 PROCEDURE — 11000001 HC ACUTE MED/SURG PRIVATE ROOM

## 2017-05-01 PROCEDURE — 93010 ELECTROCARDIOGRAM REPORT: CPT | Mod: ,,, | Performed by: INTERNAL MEDICINE

## 2017-05-01 PROCEDURE — 93005 ELECTROCARDIOGRAM TRACING: CPT

## 2017-05-01 PROCEDURE — 80053 COMPREHEN METABOLIC PANEL: CPT

## 2017-05-01 PROCEDURE — 99284 EMERGENCY DEPT VISIT MOD MDM: CPT | Mod: 25

## 2017-05-01 PROCEDURE — 85025 COMPLETE CBC W/AUTO DIFF WBC: CPT

## 2017-05-01 PROCEDURE — 51702 INSERT TEMP BLADDER CATH: CPT

## 2017-05-01 RX ORDER — HYDRALAZINE HYDROCHLORIDE 20 MG/ML
5 INJECTION INTRAMUSCULAR; INTRAVENOUS ONCE AS NEEDED
Status: ACTIVE | OUTPATIENT
Start: 2017-05-02 | End: 2017-05-02

## 2017-05-01 RX ORDER — CALCIUM GLUCONATE 98 MG/ML
1 INJECTION, SOLUTION INTRAVENOUS
Status: DISCONTINUED | OUTPATIENT
Start: 2017-05-02 | End: 2017-05-03 | Stop reason: HOSPADM

## 2017-05-01 RX ORDER — MAGNESIUM SULFATE HEPTAHYDRATE 40 MG/ML
2 INJECTION, SOLUTION INTRAVENOUS ONCE
Status: COMPLETED | OUTPATIENT
Start: 2017-05-02 | End: 2017-05-01

## 2017-05-01 RX ORDER — ACETAMINOPHEN 325 MG/1
650 TABLET ORAL EVERY 6 HOURS PRN
Status: DISCONTINUED | OUTPATIENT
Start: 2017-05-02 | End: 2017-05-02

## 2017-05-01 RX ORDER — MAGNESIUM SULFATE HEPTAHYDRATE 40 MG/ML
2 INJECTION, SOLUTION INTRAVENOUS CONTINUOUS
Status: DISCONTINUED | OUTPATIENT
Start: 2017-05-02 | End: 2017-05-03 | Stop reason: HOSPADM

## 2017-05-01 RX ORDER — SODIUM CHLORIDE, SODIUM LACTATE, POTASSIUM CHLORIDE, CALCIUM CHLORIDE 600; 310; 30; 20 MG/100ML; MG/100ML; MG/100ML; MG/100ML
1000 INJECTION, SOLUTION INTRAVENOUS CONTINUOUS
Status: ACTIVE | OUTPATIENT
Start: 2017-05-02 | End: 2017-05-02

## 2017-05-01 RX ORDER — HYDRALAZINE HYDROCHLORIDE 20 MG/ML
5 INJECTION INTRAMUSCULAR; INTRAVENOUS ONCE
Status: DISCONTINUED | OUTPATIENT
Start: 2017-05-02 | End: 2017-05-03 | Stop reason: HOSPADM

## 2017-05-01 RX ADMIN — MAGNESIUM SULFATE IN WATER 2 G/HR: 40 INJECTION, SOLUTION INTRAVENOUS at 11:05

## 2017-05-01 RX ADMIN — MAGNESIUM SULFATE IN WATER 2 G: 40 INJECTION, SOLUTION INTRAVENOUS at 11:05

## 2017-05-01 RX ADMIN — SODIUM CHLORIDE, SODIUM LACTATE, POTASSIUM CHLORIDE, AND CALCIUM CHLORIDE 1000 ML: .6; .31; .03; .02 INJECTION, SOLUTION INTRAVENOUS at 11:05

## 2017-05-01 NOTE — IP AVS SNAPSHOT
[facility]  [id number] [place] [address] [address] [place] [address]           Patient Discharge Instructions   Our goal is to set you up for success. This packet includes information on your condition, medications, and your home care.  It will help you care for yourself to prevent having to return to the hospital.     Please ask your nurse if you have any questions.      There are many details to remember when preparing to leave the hospital. Here is what you will need to do:    1. Take your medicine. If you are prescribed medications, review your Medication List on the following pages. You may have new medications to  at the pharmacy and others that you'll need to stop taking. Review the instructions for how and when to take your medications. Talk with your doctor or nurses if you are unsure of what to do.     2. Go to your follow-up appointments. Specific follow-up information is listed in the following pages. Your may be contacted by a nurse or clinical provider about future appointments. Be sure we have all of the phone numbers to reach you. Please contact your provider's office if you are unable to make an appointment.     3. Watch for warning signs. Your doctor or nurse will give you detailed warning signs to watch for and when to call for assistance. These instructions may also include educational information about your condition. If you experience any of warning signs to your health, call your doctor.           [facility]  Unless otherwise directed by your provider, please   contact [facility], our nurse care line   that is available for 24/7 assistance.     [phone] (toll-free)     Registered nurses in the [facility]   provide: appointment scheduling, clinical advisement, health education, and other advisory services.                  ** Verify the list of medication(s) below is accurate and up to date. Carry this with you in case of emergency. If your  medications have changed, please notify your healthcare provider.             Medication List      ASK your doctor about these medications        Additional Info                      ibuprofen 600 MG tablet   Commonly known as:  ADVIL,MOTRIN   Quantity:  30 tablet   Refills:  1   Dose:  600 mg    Last time this was given:  600 mg on 5/3/2017  5:09 PM   Instructions:  Take 1 tablet (600 mg total) by mouth every 6 (six) hours as needed for Pain.     Begin Date    AM    Noon    PM    Bedtime       oxycodone-acetaminophen 5-325 mg per tablet   Commonly known as:  PERCOCET   Quantity:  30 tablet   Refills:  0   Dose:  1 tablet    Last time this was given:  1 tablet on 5/2/2017  9:23 PM   Instructions:  Take 1 tablet by mouth every 4 (four) hours as needed for Pain.     Begin Date    AM    Noon    PM    Bedtime       prenatal vit #105-iron-FA-dha 30 mg iron- 1.4 mg-300 mg Cmpk   Refills:  0    Instructions:  Take by mouth.     Begin Date    AM    Noon    PM    Bedtime                  Please bring to all follow up appointments:    1. A copy of your discharge instructions.  2. All medicines you are currently taking in their original bottles.  3. Identification and insurance card.    Please arrive 15 minutes ahead of scheduled appointment time.    Please call 24 hours in advance if you must reschedule your appointment and/or time.        Your Scheduled Appointments     May 08, 2017 11:00 AM CDT   Nurse Visit with OB GYN NURSE, BULMARO Avila - OB/ GYN ([facility])    [address] [place] [address]   [phone]            Jun 05, 2017 10:00 AM CDT   Post Partum with MD Austin Alejo - OB/ GYN ([facility])    [address] [place] [address]   [phone]            Aug 14, 2017  9:45 AM CDT   Non-Fasting Lab with LABORATORY, [facility] ([facility])    [address]  [facility] [address]   [phone]            Aug 28, 2017  " 9:00 AM CDT   GASTROENTEROLOGY ESTABLISHED PATIENT with MARGO Najera   [facility] - G[facility] ([facility])    [address]  [place] [address] [phone]              Follow-up Information     Please follow up.    Why:  Nurse Visit, blood pressure check          Discharge Instructions       Mother Self Care:    Activity: Avoid strenuous exercise and get adequate rest.  No driving until the physician consent given.  Emotional Changes: Most women find birth to be a time of great emotional upheaval.  Sense of loss, mood swings, fatigue, anxiety, and feeling "let down" are common.  If feelings worsen or last more than a week, call your physician.  Breast Care/Breastfeeding: Wear a bra for comfort.  Keep nipples dry and apply your own breast milk or lanolin cream as needed for soreness.  Engorgement can be relieved with warm, moist heat before feedings.  You may also take Ibuprofen.  Breast Care/Bottle Feeding: Wear support bra 24 hours a day for one week.  Avoid stimulation to breasts.  You may use ice packs for discomfort.  Petr-Care/Vaginal Bleeding: Remember to use your petr-bottle after urinating.  Your flow will change from red, to pink, to yellow/white color over a period of 2 weeks.  Menstruation will return in 3-8 weeks, or longer if breastfeeding.  Episiotomy Vaginal Delivery: Stitches will dissolve within 10 days to 3 weeks.  Warm baths, tucks, and dermoplast will promote healing.  Avoid bubble baths or strong soaps.   Section/Tubal Ligation: Keep incision clean and dry.  Please remove steri-strips in 5-7 days.  You may shower, but avoid baths.  Sexual Activity/Pelvic Rest: No sexual activity, tampons, or douching until your physician gives you consent.  Diet: Continue to eat from the five basic food groups, including plenty of protein, fruits, vegetables, and whole grains.  Limit empty calories and high fat foods.  Drink enough fluids to satisfy thirst and add an extra 500 " "calories for breastfeeding.  Constipation/Hemorrhoids: Drink plenty of water.  You may take a stool softener or natural laxative (Metamucil). You may use tucks or hemorrhoid ointment and soak in a warm tub.    CALL YOUR OB DOCTOR IF ANY OF THE FOLLOWING OCCURS:  *Heavy bleeding - saturating a pad an hour or passing any large (2-3 inches in size) blood clots.  *Any pain, redness, or tenderness in lower leg.  *You cannot care for yourself or your baby.  *Any signs of infection-      - Temperature greater than 100.5 degrees F      - Foul smelling vaginal discharge and/or incisional drainage      - Increased episiotomy or incisional pain      - Hot, hard, red or sore area on breast      - Flu-like symptoms      - Any urgency, frequency or burning with urination        Primary Diagnosis     Your primary diagnosis was:  High Blood Pressure And Swelling During Pregnancy      Admission Information     Date & Time Provider Department CSN    5/1/2017  8:01 PM Nidhi Oglesby MD [facility] -  [id number]      Care Providers     Provider Role Specialty Primary office phone    Nidhi Oglesby MD Attending Provider Gynecology [phone]      Your Vitals Were     BP Pulse Temp Resp Height Weight    131/82 70 97.7 °F (36.5 °C) (Oral) 18 5' 8" (1.727 m) 88.9 kg (196 lb)    Last Period SpO2 BMI          10/11/2016 (Approximate) 100% 29.8 kg/m2        Recent Lab Values     No lab values to display.      Allergies as of 5/3/2017     No Known Allergies      Advance Directives     An advance directive is a document which, in the event you are no longer able to make decisions for yourself, tells your healthcare team what kind of treatment you do or do not want to receive, or who you would like to make those decisions for you.  If you do not currently have an advance directive, [facility] encourages you to create one.  For more information call:  [phone]), [phone] ([phone]),  or log on to " [email or web address].        Language Assistance Services     ATTENTION: Language assistance services are available, free of charge. Please call [phone].      ATENCIÓN: Si habla jus, tiene a montano disposición servicios gratuitos de asistencia lingüística. Llame al [phone].     CHÚ Ý: N?u b?n nói Ti?ng Vi?t, có các d?ch v? h? tr? ngôn ng? mi?n phí dành cho b?n. G?i s? [phone].         [facility] complies with applicable Federal civil rights laws and does not discriminate on the basis of race, color, national origin, age, disability, or sex.

## 2017-05-01 NOTE — DISCHARGE SUMMARY
[facility] -   Obstetrics  Discharge Summary      Patient Name: Radha Vanegas  MRN: [id number]  Admission Date: 2017  Hospital Length of Stay: 3 days  Discharge Date and Time: 2017  Attending Physician: No att. providers found   Discharging Provider: Vera Meza MD  Primary Care Provider: Anika Johnston MD    HPI: Sent from clinic for high blood pressure and IUGR     Procedure(s) (LRB):  DELIVERY- SECTION (N/A)     Hospital Course:   17:  Underwent repeat LTCS for hx of C/S x 2, mild preeclampsia.  Postpartum course complicated by PP hemorrhage.  17:  Underwent exam under anesthesia with uterine curettage for PP hemorrhage.  Received 2 units PRBC's.  17:  Routine post-op advances.  Hb/Hct .  Iron supplementation started.       Consults         Status Ordering Provider     Inpatient consult to Social Work  Once     Provider:  (Not yet assigned)    Completed VERA MEZA          Final Active Diagnoses:    Diagnosis Date Noted POA    PRINCIPAL PROBLEM:  Status post repeat low transverse  section [Z98.891] 2017 Not Applicable    Third-stage postpartum hemorrhage [O72.0] 2017 No    Acute blood loss anemia [D62] 2017 No    Mild preeclampsia [O14.00] 2017 Yes      Problems Resolved During this Admission:    Diagnosis Date Noted Date Resolved POA        Feeding Method: bottle    Immunizations     Date Immunization Status Dose Route/Site Given by    17 1402 MMR Deleted       17 0951 Tdap Given 0.5 mL Intramuscular/Left deltoid Margaret Baker RN          Delivery:    Episiotomy: None   Lacerations: None   Repair suture:     Repair # of packets:     Blood loss (ml): 0     Birth information:  YOB: 2019   Time of birth: 9:34 PM   Sex: male   Delivery type: , Low Transverse   Gestational Age: 38w2d    Delivery Clinician:      Other providers:       Additional   information:  Forceps:    Vacuum:    Breech:    Observed anomalies      Living?:           APGARS  One minute Five minutes Ten minutes   Skin color:         Heart rate:         Grimace:         Muscle tone:         Breathing:         Totals: 9  9        Placenta: Delivered:       appearance    Pending Diagnostic Studies:     None          Discharged Condition: stable    Disposition: Home or Self Care    Follow Up:  Follow-up Information     Follow up with Cecilia Mcgill MD. Go on 6/5/2017.    Specialties:  Obstetrics, Obstetrics and Gynecology    Why:  10AM    Contact information:    [address] [id number] [phone]          Follow up with Rita Pagan CNM. Go on 5/2/2017.    Specialty:  Obstetrics    Why:  11:45AM.....dressing removal    Contact information:    [address]  [place] [address]  [phone]          Patient Instructions:     Diet general     Lifting restrictions   Scheduling Instructions: No lifting more than 15 pounds x 4 weeks.     Other restrictions (specify):   Scheduling Instructions: Pelvic rest x 6 weeks     Call MD for:  temperature >100.4     Call MD for:  persistent nausea and vomiting or diarrhea     Call MD for:  severe uncontrolled pain     Call MD for:  redness, tenderness, or signs of infection (pain, swelling, redness, odor or green/yellow discharge around incision site)     Call MD for:  difficulty breathing or increased cough     Call MD for:  severe persistent headache     Call MD for:  persistent dizziness, light-headedness, or visual disturbances     Leave dressing on - Keep it clean, dry, and intact until clinic visit       Medications:  Discharge Medication List as of 4/28/2017 11:33 AM      START taking these medications    Details   ibuprofen (ADVIL,MOTRIN) 600 MG tablet Take 1 tablet (600 mg total) by mouth every 6 (six) hours as needed for Pain., Starting 4/28/2017, Until Sat 4/28/18, Normal      oxycodone-acetaminophen (PERCOCET) 5-325 mg  per tablet Take 1 tablet by mouth every 4 (four) hours as needed for Pain., Starting 4/28/2017, Until Discontinued, Normal         CONTINUE these medications which have NOT CHANGED    Details   prenatal vit #105-iron-FA-dha 30 mg iron- 1.4 mg-300 mg Cmpk Take by mouth., Until Discontinued, Historical Med             Vera Dorado MD  Obstetrics  [facility]

## 2017-05-02 PROBLEM — D50.9 IRON DEFICIENCY ANEMIA: Status: ACTIVE | Noted: 2017-05-02

## 2017-05-02 PROCEDURE — 63600175 PHARM REV CODE 636 W HCPCS: Performed by: ADVANCED PRACTICE MIDWIFE

## 2017-05-02 PROCEDURE — 11000001 HC ACUTE MED/SURG PRIVATE ROOM

## 2017-05-02 PROCEDURE — 25000003 PHARM REV CODE 250: Performed by: ADVANCED PRACTICE MIDWIFE

## 2017-05-02 PROCEDURE — 99232 SBSQ HOSP IP/OBS MODERATE 35: CPT | Mod: ,,, | Performed by: OBSTETRICS & GYNECOLOGY

## 2017-05-02 PROCEDURE — 25000003 PHARM REV CODE 250: Performed by: OBSTETRICS & GYNECOLOGY

## 2017-05-02 RX ORDER — SODIUM CHLORIDE, SODIUM LACTATE, POTASSIUM CHLORIDE, CALCIUM CHLORIDE 600; 310; 30; 20 MG/100ML; MG/100ML; MG/100ML; MG/100ML
1000 INJECTION, SOLUTION INTRAVENOUS CONTINUOUS
Status: ACTIVE | OUTPATIENT
Start: 2017-05-02 | End: 2017-05-02

## 2017-05-02 RX ORDER — OXYCODONE AND ACETAMINOPHEN 10; 325 MG/1; MG/1
1 TABLET ORAL EVERY 4 HOURS PRN
Status: DISCONTINUED | OUTPATIENT
Start: 2017-05-02 | End: 2017-05-03 | Stop reason: HOSPADM

## 2017-05-02 RX ORDER — IBUPROFEN 600 MG/1
600 TABLET ORAL EVERY 6 HOURS
Status: DISCONTINUED | OUTPATIENT
Start: 2017-05-02 | End: 2017-05-03 | Stop reason: HOSPADM

## 2017-05-02 RX ORDER — OXYCODONE AND ACETAMINOPHEN 5; 325 MG/1; MG/1
1 TABLET ORAL EVERY 4 HOURS PRN
Status: DISCONTINUED | OUTPATIENT
Start: 2017-05-02 | End: 2017-05-03 | Stop reason: HOSPADM

## 2017-05-02 RX ADMIN — MAGNESIUM SULFATE IN WATER 2 G/HR: 40 INJECTION, SOLUTION INTRAVENOUS at 07:05

## 2017-05-02 RX ADMIN — IBUPROFEN 600 MG: 600 TABLET, FILM COATED ORAL at 12:05

## 2017-05-02 RX ADMIN — OXYCODONE HYDROCHLORIDE AND ACETAMINOPHEN 1 TABLET: 5; 325 TABLET ORAL at 05:05

## 2017-05-02 RX ADMIN — OXYCODONE HYDROCHLORIDE AND ACETAMINOPHEN 1 TABLET: 10; 325 TABLET ORAL at 12:05

## 2017-05-02 RX ADMIN — OXYCODONE HYDROCHLORIDE AND ACETAMINOPHEN 1 TABLET: 5; 325 TABLET ORAL at 08:05

## 2017-05-02 RX ADMIN — IBUPROFEN 600 MG: 600 TABLET, FILM COATED ORAL at 08:05

## 2017-05-02 RX ADMIN — IBUPROFEN 600 MG: 600 TABLET, FILM COATED ORAL at 06:05

## 2017-05-02 RX ADMIN — OXYCODONE HYDROCHLORIDE AND ACETAMINOPHEN 1 TABLET: 5; 325 TABLET ORAL at 09:05

## 2017-05-02 RX ADMIN — ACETAMINOPHEN 650 MG: 325 TABLET ORAL at 04:05

## 2017-05-02 RX ADMIN — SODIUM CHLORIDE, SODIUM LACTATE, POTASSIUM CHLORIDE, AND CALCIUM CHLORIDE 1000 ML: .6; .31; .03; .02 INJECTION, SOLUTION INTRAVENOUS at 09:05

## 2017-05-02 NOTE — PROGRESS NOTES
"Pt crying; family roles with her mother vs. FOB causing arguments; pt declines needing to talk, but "I'm stressed out right now with them!"  "

## 2017-05-02 NOTE — ED NOTES
Patient in bed with spouse at bedside.  Vitals have improved.  Patient AAOx4.  Bed is in low, locked, position with side rails up x 2 and call light within reach.  Will continue to monitor.

## 2017-05-02 NOTE — PROGRESS NOTES
[employer] Symphony pump at bedside. Instructed on proper usage and to adjust suction according to comfort level. Verified appropriate flange fit- 24. Educated mother on frequency and duration of pumping in order to promote and maintain full milk supply. Hands on pumping technique reviewed. Encouraged hand expression after. Instructed mother on cleaning of breast pump parts. Reviewed proper milk handling, collection, storage, and transportation. Voices understanding.

## 2017-05-02 NOTE — ASSESSMENT & PLAN NOTE
Start MgSo4 6/4 gm per protocol  Serial BP  Observe closely  Urinary catheter and check I&O balance

## 2017-05-02 NOTE — PROGRESS NOTES
[facility] -   Obstetrics & Gynecology  Progress Note    Patient Name: Radha Vanegas  MRN: [id number]  Admission Date: 2017  Primary Care Provider: Anika Johnston MD  Principal Problem: <principal problem not specified>    Subjective:     HPI:  17 at 2300hrs   Transferred from ED for PP MgSO4 secondary to elevated BP      No new subjective & objective note has been filed under this hospital service since the last note was generated.    Assessment/Plan:   27 yr old  1 week S/P C&S and 6 days S/P D&C  Elevated BP PP   with headache  Significant history    PLan  Start MgSO4 per protocol  Serial BP  Observe closely            Sherice Sanchez CNM  Obstetrics & Gynecology  [facility]

## 2017-05-02 NOTE — ASSESSMENT & PLAN NOTE
Continue with MgSO4.  BP improved and stable.  Continue with current management to complete 24 hrs of MgSO4.  Pt counseled on management plan and discharge goals.

## 2017-05-02 NOTE — PROGRESS NOTES
[facility] -   Obstetrics & Gynecology  Progress Note    Patient Name: Radha Vanegas  MRN: [id number]  Admission Date: 2017  Primary Care Provider: Anika Johnston MD  Principal Problem: Preeclampsia    Subjective:     HPI:  Radha Vanegas  presented to the ER c/o severe h/a, elevated blood pressures at home. Her reflexes were brisk per DrFerguson in the emergency room.  She is s/p repeat LTCS on 17.   Then later the same day had a D+C for PP hemorrhage and received 2 units pRBCs. EBL 400mL.  She also has a history of mild pre-eclampsia, a history of substance abuse, hepatitis B and C.       Interval History:     Scheduled Meds:   hydrALAZINE  5 mg Intravenous Once     Continuous Infusions:   lactated ringers 1,000 mL (17 2323)    magnesium sulfate in water 2 g/hr (17 2347)     PRN Meds:acetaminophen, calcium gluconate, hydrALAZINE    Review of patient's allergies indicates:  No Known Allergies    Objective:     Vital Signs (Most Recent):  Temp: 98.2 °F (36.8 °C) (17 2333)  Pulse: 75 (17 0503)  Resp: 18 (17 0502)  BP: 128/73 (17 0502)  SpO2: 97 % (17 0503) Vital Signs (24h Range):  Temp:  [98.2 °F (36.8 °C)-98.3 °F (36.8 °C)] 98.2 °F (36.8 °C)  Pulse:  [67-87] 75  Resp:  [16-18] 18  SpO2:  [97 %-100 %] 97 %  BP: (119-169)/() 128/73     Weight: 88.9 kg (196 lb)  Body mass index is 29.8 kg/(m^2).  Patient's last menstrual period was 10/11/2016 (approximate).    I&O (Last 24H):      Intake/Output Summary (Last 24 hours) at 17 0539  Last data filed at 17 0500    <table CELLSPACING=0 CELLPADDING=0 BORDER=1>  <tr><td VUOZC=310%></td>  <td ZJILB=558%><b>Gross per 24 hour</b></td></tr>  <tr><td EGGYR=530%>Intake</td>  <td EXWRB=752%>            822.5 ml</td></tr>  <tr><td LFLVN=308%>Output</td>  <td OBFGL=348%>             1710 ml</td></tr>  <tr><td DYNWM=855%><b>Net</b></td>  <td XDCYW=817%><font color=Red>           -887.5  ml</td></tr>  </table>          Assessment/Plan:     * Preeclampsia  Start MgSo4 6/4 gm per protocol  Serial BP  Observe closely  Urinary catheter and check I&O balance       History of heroin abuse   Urine toxicology    Hepatitis B affecting pregnancy       Hepatitis C       Iron deficiency anemia  Continue iron supplementation once eating again      Nidhi Oglesby MD  Obstetrics & Gynecology  [facility]

## 2017-05-02 NOTE — PLAN OF CARE
Problem: Patient Care Overview  Goal: Plan of Care Review  Outcome: Ongoing (interventions implemented as appropriate)  VSS; pt verbalized percocet and motrin are now helping with headache; baby and significant other at bedside; SCDs in place; pt resting

## 2017-05-02 NOTE — PROGRESS NOTES
aquacel dressing removed per Dr. Ng verbal orders when rounding; incision well-approximated and intact; no drainage or redness noted to site; incision open to air; pt tolerated well

## 2017-05-02 NOTE — SUBJECTIVE & OBJECTIVE
Interval History: Pt reports still feeling headache, but otherwise doing well.  Has some incisional pain and requests medication for this.  Denies CP.    Scheduled Meds:   hydrALAZINE  5 mg Intravenous Once     Continuous Infusions:   lactated ringers 1,000 mL (05/01/17 2323)    magnesium sulfate in water 2 g/hr (05/01/17 2347)     PRN Meds:acetaminophen, calcium gluconate, hydrALAZINE    Review of patient's allergies indicates:  No Known Allergies    Objective:     Vital Signs (Most Recent):  Temp: 98.4 °F (36.9 °C) (05/02/17 0700)  Pulse: 70 (05/02/17 0602)  Resp: 18 (05/02/17 0602)  BP: 126/67 (05/02/17 0602)  SpO2: 99 % (05/02/17 0602) Vital Signs (24h Range):  Temp:  [98.2 °F (36.8 °C)-98.4 °F (36.9 °C)] 98.4 °F (36.9 °C)  Pulse:  [67-87] 70  Resp:  [16-18] 18  SpO2:  [97 %-100 %] 99 %  BP: (119-169)/() 126/67     Weight: 88.9 kg (196 lb)  Body mass index is 29.8 kg/(m^2).  Patient's last menstrual period was 10/11/2016 (approximate).    I&O (Last 24H):      Intake/Output Summary (Last 24 hours) at 05/02/17 0726  Last data filed at 05/02/17 0700    <table CELLSPACING=0 CELLPADDING=0 BORDER=1>  <tr><td JRXJV=924%></td>  <td GGHQN=176%><b>Gross per 24 hour</b></td></tr>  <tr><td WTGGT=502%>Intake</td>  <td WARSX=077%>           1122.5 ml</td></tr>  <tr><td SSARY=816%>Output</td>  <td QZIHC=701%>             2055 ml</td></tr>  <tr><td IVSNB=553%><b>Net</b></td>  <td TXGWE=386%><font color=Red>           -932.5 ml</td></tr>  </table>      Laboratory:  BMP:   Recent Labs  Lab 05/01/17 2041   GLU 81      K 4.2   *   CO2 20*   BUN 13   CREATININE 0.8   CALCIUM 8.4*     CBC:   Recent Labs  Lab 05/01/17 2041   WBC 8.55   RBC 2.68*   HGB 8.2*   HCT 24.6*      MCV 92   MCH 30.6   MCHC 33.3       Diagnostic Results:  None    Physical Exam:   Constitutional: She is oriented to person, place, and time. She appears well-developed and well-nourished. No distress.    HENT:   Head: Normocephalic and  atraumatic.    Eyes: Pupils are equal, round, and reactive to light.    Neck: Normal range of motion.    Cardiovascular: Normal rate, regular rhythm and normal heart sounds.     Pulmonary/Chest: Effort normal and breath sounds normal.        Abdominal: Soft. Bowel sounds are normal. She exhibits abdominal incision. She exhibits no distension. There is no tenderness.                 Musculoskeletal: Normal range of motion and moves all extremeties. She exhibits no edema or tenderness.       Neurological: She is alert and oriented to person, place, and time.    Skin: Skin is warm and dry.    Psychiatric: She has a normal mood and affect. Her behavior is normal. Thought content normal.

## 2017-05-02 NOTE — PROGRESS NOTES
[facility] -   Obstetrics & Gynecology  Progress Note    Patient Name: Radha Vanegas  MRN: [id number]  Admission Date: 2017  Primary Care Provider: Anika Johnston MD  Principal Problem: Preeclampsia    Subjective:     HPI:  Radha Vanegas  presented to the ER c/o severe h/a, elevated blood pressures at home. Her reflexes were brisk per DrFerguson in the emergency room.  She is s/p repeat LTCS on 17.   Then later the same day had a D+C for PP hemorrhage and received 2 units pRBCs. EBL 400mL.  She also has a history of mild pre-eclampsia, a history of substance abuse, hepatitis B and C.       Interval History: Pt reports still feeling headache, but otherwise doing well.  Has some incisional pain and requests medication for this.  Denies CP.    Scheduled Meds:   hydrALAZINE  5 mg Intravenous Once     Continuous Infusions:   lactated ringers 1,000 mL (17 2323)    magnesium sulfate in water 2 g/hr (17 0067)     PRN Meds:acetaminophen, calcium gluconate, hydrALAZINE    Review of patient's allergies indicates:  No Known Allergies    Objective:     Vital Signs (Most Recent):  Temp: 98.4 °F (36.9 °C) (17 0700)  Pulse: 70 (17 0602)  Resp: 18 (17 0602)  BP: 126/67 (17 0602)  SpO2: 99 % (17 0602) Vital Signs (24h Range):  Temp:  [98.2 °F (36.8 °C)-98.4 °F (36.9 °C)] 98.4 °F (36.9 °C)  Pulse:  [67-87] 70  Resp:  [16-18] 18  SpO2:  [97 %-100 %] 99 %  BP: (119-169)/() 126/67     Weight: 88.9 kg (196 lb)  Body mass index is 29.8 kg/(m^2).  Patient's last menstrual period was 10/11/2016 (approximate).    I&O (Last 24H):      Intake/Output Summary (Last 24 hours) at 17  Last data filed at 05/02/17 0700    <table CELLSPACING=0 CELLPADDING=0 BORDER=1>  <tr><td YNDMI=478%></td>  <td VKVRY=714%><b>Gross per 24 hour</b></td></tr>  <tr><td QXZUI=420%>Intake</td>  <td TEQGE=088%>           1122.5 ml</td></tr>  <tr><td  GBRHP=411%>Output</td>  <td LOSTR=810%>             2055 ml</td></tr>  <tr><td OELCT=687%><b>Net</b></td>  <td CMBAB=896%><font color=Red>           -932.5 ml</td></tr>  </table>      Laboratory:  BMP:   Recent Labs  Lab 17   GLU 81      K 4.2   *   CO2 20*   BUN 13   CREATININE 0.8   CALCIUM 8.4*     CBC:   Recent Labs  Lab 17   WBC 8.55   RBC 2.68*   HGB 8.2*   HCT 24.6*      MCV 92   MCH 30.6   MCHC 33.3       Diagnostic Results:  None    Physical Exam:   Constitutional: She is oriented to person, place, and time. She appears well-developed and well-nourished. No distress.    HENT:   Head: Normocephalic and atraumatic.    Eyes: Pupils are equal, round, and reactive to light.    Neck: Normal range of motion.    Cardiovascular: Normal rate, regular rhythm and normal heart sounds.     Pulmonary/Chest: Effort normal and breath sounds normal.        Abdominal: Soft. Bowel sounds are normal. She exhibits abdominal incision. She exhibits no distension. There is no tenderness.                 Musculoskeletal: Normal range of motion and moves all extremeties. She exhibits no edema or tenderness.       Neurological: She is alert and oriented to person, place, and time.    Skin: Skin is warm and dry.    Psychiatric: She has a normal mood and affect. Her behavior is normal. Thought content normal.       Assessment/Plan:     * Preeclampsia  Continue with MgSO4.  BP improved and stable.  Continue with current management to complete 24 hrs of MgSO4.  Pt counseled on management plan and discharge goals.       Status post repeat low transverse  section  Doing well.  Remove silver dressing today.  Will add oral pain meds.      Abram Ng MD  Obstetrics & Gynecology  [facility] [place]

## 2017-05-02 NOTE — SUBJECTIVE & OBJECTIVE
Interval History:     Scheduled Meds:   hydrALAZINE  5 mg Intravenous Once     Continuous Infusions:   lactated ringers 1,000 mL (05/01/17 2323)    magnesium sulfate in water 2 g/hr (05/01/17 2347)     PRN Meds:acetaminophen, calcium gluconate, hydrALAZINE    Review of patient's allergies indicates:  No Known Allergies    Objective:     Vital Signs (Most Recent):  Temp: 98.2 °F (36.8 °C) (05/01/17 2333)  Pulse: 75 (05/02/17 0503)  Resp: 18 (05/02/17 0502)  BP: 128/73 (05/02/17 0502)  SpO2: 97 % (05/02/17 0503) Vital Signs (24h Range):  Temp:  [98.2 °F (36.8 °C)-98.3 °F (36.8 °C)] 98.2 °F (36.8 °C)  Pulse:  [67-87] 75  Resp:  [16-18] 18  SpO2:  [97 %-100 %] 97 %  BP: (119-169)/() 128/73     Weight: 88.9 kg (196 lb)  Body mass index is 29.8 kg/(m^2).  Patient's last menstrual period was 10/11/2016 (approximate).    I&O (Last 24H):      Intake/Output Summary (Last 24 hours) at 05/02/17 0539  Last data filed at 05/02/17 0500    <table CELLSPACING=0 CELLPADDING=0 BORDER=1>  <tr><td SINQW=754%></td>  <td TIQPH=853%><b>Gross per 24 hour</b></td></tr>  <tr><td PFLYX=515%>Intake</td>  <td IFKZK=167%>            822.5 ml</td></tr>  <tr><td NXAVX=416%>Output</td>  <td EBGXW=755%>             1710 ml</td></tr>  <tr><td EZHKP=310%><b>Net</b></td>  <td DQNTA=258%><font color=Red>           -887.5 ml</td></tr>  </table>      Laboratory:  None    Diagnostic Results:  None    Physical Exam

## 2017-05-02 NOTE — ED PROVIDER NOTES
SCRIBE #1 NOTE: I, Mariela Natacha, am scribing for, and in the presence of, Slim Julio MD I have scribed the entire note.      History      Chief Complaint   Patient presents with    Hypertension     pt reports she is 6 days postpartem and was preeclamptic and her BP is still running high       Review of patient's allergies indicates:  No Known Allergies     HPI   HPI    2017, 8:09 PM   History obtained from the patient      History of Present Illness: Radha Vanegas is a 27 y.o. female patient who is 6 days postpartum presents to the Emergency Department for HTN which onset gradually prior to   6 days ago. Sxs are constant and moderate in severity. Pt reports she was preeclamptic prior to  and her blood pressure is still running high. There are no mitigating or exacerbating factors noted. Associated sxs include HA.  Pt denies any fever, N/V/D, dizziness, CP, SOB, abd pain, vaginal bleeding, and all other sxs at this time. No further complaints or concerns at this time.     Arrival mode: Personal vehicle    PCP: Anika Johnston MD       Past Medical History:  Past Medical History:   Diagnosis Date    ADD (attention deficit disorder)     Anxiety     Depression     Hepatitis     Hep A,B,C    Hypertension     Migraine headache     Panic attack        Past Surgical History:  Past Surgical History:   Procedure Laterality Date    APPENDECTOMY       SECTION      x1         Family History:  Family History   Problem Relation Age of Onset    Hypertension Mother     Breast cancer Neg Hx     Ovarian cancer Neg Hx     Deep vein thrombosis Neg Hx     Stomach cancer Neg Hx     Liver disease Neg Hx     Colon cancer Neg Hx        Social History:  Social History     Social History Main Topics    Smoking status: Current Some Day Smoker     Packs/day: 0.25     Types: Cigarettes    Smokeless tobacco: Never Used    Alcohol use No    Drug use: No      Comment: previous  drug user (heroin, meth)    Sexual activity: Yes     Partners: Male       ROS   Review of Systems   Constitutional: Negative for fever.   HENT: Negative for sore throat.    Respiratory: Negative for shortness of breath.    Cardiovascular: Negative for chest pain.        (+)  HTN     Gastrointestinal: Negative for abdominal pain, diarrhea, nausea and vomiting.   Genitourinary: Negative for dysuria.   Musculoskeletal: Negative for back pain.   Skin: Negative for rash.   Neurological: Positive for headaches. Negative for dizziness and weakness.   Hematological: Does not bruise/bleed easily.       Physical Exam    Initial Vitals   BP Pulse Resp Temp SpO2   05/01/17 1911 05/01/17 1911 05/01/17 1911 05/01/17 1911 05/01/17 1911   169/103 87 18 98.3 °F (36.8 °C) 100 %      Physical Exam  Nursing Notes and Vital Signs Reviewed.  Constitutional: Patient is in no acute distress. Awake and alert. Well-developed and well-nourished.  Head: Atraumatic. Normocephalic.  Eyes: PERRL. EOM intact. Conjunctivae are not pale. No scleral icterus.  ENT: Mucous membranes are moist. Oropharynx is clear and symmetric.    Neck: Supple. Full ROM. No lymphadenopathy.  Cardiovascular: Regular rate. Regular rhythm. No murmurs, rubs, or gallops. Distal pulses are 2+ and symmetric.  Pulmonary/Chest: No respiratory distress. Clear to auscultation bilaterally. No wheezing, rales, or rhonchi.  Abdominal: Soft and non-distended.  There is no tenderness.  No rebound, guarding, or rigidity. Good bowel sounds.  Genitourinary: No CVA tenderness  Musculoskeletal: Moves all extremities. No obvious deformities. No edema. No calf tenderness. Hyperreflexia.  Skin: Warm and dry.  Neurological:  Alert, awake, and appropriate.  Normal speech.  No acute focal neurological deficits are appreciated.  Psychiatric: Normal affect. Good eye contact. Appropriate in content.    ED Course    Procedures  ED Vital Signs:  Vitals:    05/01/17 1911 05/01/17 2049 05/01/17 2216  "05/01/17 2333   BP: (!) 169/103 (!) 146/92 137/71 (!) 147/91   Pulse: 87 79 75 68   Resp: 18 18 16 18   Temp: 98.3 °F (36.8 °C)   98.2 °F (36.8 °C)   TempSrc: Oral   Oral   SpO2: 100% 100% 100% 100%   Weight: 88.9 kg (196 lb)      Height: 5' 8" (1.727 m)       05/01/17 2358 05/02/17 0002 05/02/17 0101 05/02/17 0103   BP:  121/73 133/88    Pulse: 81 83 74 67   Resp:   18    Temp:       TempSrc:       SpO2: 100%   100%   Weight:       Height:        05/02/17 0203 05/02/17 0302 05/02/17 0303 05/02/17 0405   BP: 130/86 119/71  128/74   Pulse: 80 79 79 76   Resp: 18 16 18   Temp:       TempSrc:       SpO2: 100%  97%    Weight:       Height:        05/02/17 0408   BP:    Pulse: 72   Resp:    Temp:    TempSrc:    SpO2: 98%   Weight:    Height:        Abnormal Lab Results:  Labs Reviewed   CBC W/ AUTO DIFFERENTIAL - Abnormal; Notable for the following:        Result Value    RBC 2.68 (*)     Hemoglobin 8.2 (*)     Hematocrit 24.6 (*)     RDW 15.2 (*)     Gran% 73.7 (*)     All other components within normal limits   COMPREHENSIVE METABOLIC PANEL - Abnormal; Notable for the following:     Chloride 113 (*)     CO2 20 (*)     Calcium 8.4 (*)     Albumin 2.7 (*)     All other components within normal limits   URINALYSIS - Abnormal; Notable for the following:     Specific Gravity, UA <=1.005 (*)     Occult Blood UA 3+ (*)     Leukocytes, UA 2+ (*)     All other components within normal limits   URINALYSIS MICROSCOPIC - Abnormal; Notable for the following:     WBC, UA 10 (*)     All other components within normal limits        All Lab Results:  Results for orders placed or performed during the hospital encounter of 05/01/17   CBC auto differential   Result Value Ref Range    WBC 8.55 3.90 - 12.70 K/uL    RBC 2.68 (L) 4.00 - 5.40 M/uL    Hemoglobin 8.2 (L) 12.0 - 16.0 g/dL    Hematocrit 24.6 (L) 37.0 - 48.5 %    MCV 92 82 - 98 fL    MCH 30.6 27.0 - 31.0 pg    MCHC 33.3 32.0 - 36.0 %    RDW 15.2 (H) 11.5 - 14.5 %    Platelets 258 " 150 - 350 K/uL    MPV 10.6 9.2 - 12.9 fL    Gran # 6.3 1.8 - 7.7 K/uL    Lymph # 1.6 1.0 - 4.8 K/uL    Mono # 0.5 0.3 - 1.0 K/uL    Eos # 0.1 0.0 - 0.5 K/uL    Baso # 0.02 0.00 - 0.20 K/uL    Gran% 73.7 (H) 38.0 - 73.0 %    Lymph% 18.5 18.0 - 48.0 %    Mono% 6.1 4.0 - 15.0 %    Eosinophil% 1.5 0.0 - 8.0 %    Basophil% 0.2 0.0 - 1.9 %    Differential Method Automated    Comprehensive metabolic panel   Result Value Ref Range    Sodium 142 136 - 145 mmol/L    Potassium 4.2 3.5 - 5.1 mmol/L    Chloride 113 (H) 95 - 110 mmol/L    CO2 20 (L) 23 - 29 mmol/L    Glucose 81 70 - 110 mg/dL    BUN, Bld 13 6 - 20 mg/dL    Creatinine 0.8 0.5 - 1.4 mg/dL    Calcium 8.4 (L) 8.7 - 10.5 mg/dL    Total Protein 6.6 6.0 - 8.4 g/dL    Albumin 2.7 (L) 3.5 - 5.2 g/dL    Total Bilirubin 0.4 0.1 - 1.0 mg/dL    Alkaline Phosphatase 91 55 - 135 U/L    AST 32 10 - 40 U/L    ALT 30 10 - 44 U/L    Anion Gap 9 8 - 16 mmol/L    eGFR if African American >60 >60 mL/min/1.73 m^2    eGFR if non African American >60 >60 mL/min/1.73 m^2   Urinalysis   Result Value Ref Range    Specimen UA Urine, Clean Catch     Color, UA Yellow Yellow, Straw, Iman    Appearance, UA Clear Clear    pH, UA 6.0 5.0 - 8.0    Specific Gravity, UA <=1.005 (A) 1.005 - 1.030    Protein, UA Negative Negative    Glucose, UA Negative Negative    Ketones, UA Negative Negative    Bilirubin (UA) Negative Negative    Occult Blood UA 3+ (A) Negative    Nitrite, UA Negative Negative    Urobilinogen, UA Negative <2.0 EU/dL    Leukocytes, UA 2+ (A) Negative   Urinalysis Microscopic   Result Value Ref Range    RBC, UA 3 0 - 4 /hpf    WBC, UA 10 (H) 0 - 5 /hpf    Bacteria, UA Occasional None-Occ /hpf    Microscopic Comment SEE COMMENT          Imaging Results:  Imaging Results     None                  The Emergency Provider reviewed the vital signs and test results, which are outlined above.    ED Discussion       10:15 PM: Re-evaluated pt. Pt is resting comfortably and is in no acute  distress.  D/w pt all pertinent results. D/w pt any concerns expressed at this time. Answered all questions. Pt expresses understanding at this time.    10:18 PM: Discussed case with Dr. Oglesby (OB/GYN). Dr. Oglesby agrees with current care and management of pt and accepts admission.   Admitting Service: Labor & Delivery  Admitting Physician: Dr. Oglesby  Admit to: L&D    10:18 PM: Re-evaluated pt. I have discussed test results, shared treatment plan, and the need for admission with patient and family at bedside. Pt and family express understanding at this time and agree with all information. All questions answered. Pt and family have no further questions or concerns at this time. Pt is ready for admit.      .      ED Medication(s):  Medications   hydrALAZINE injection 5 mg (not administered)   lactated ringers infusion (1,000 mLs Intravenous New Bag 5/1/17 2323)   calcium gluconate 100 mg/mL (10%) injection 1 g (not administered)   magnesium sulfate in water 40 gram/1,000 mL (4 %) infusion (2 g/hr Intravenous New Bag 5/1/17 2347)   hydrALAZINE injection 5 mg (not administered)   acetaminophen tablet 650 mg (650 mg Oral Given 5/2/17 0414)   magnesium sulfate 2g in water 50mL IVPB (premix) (2 g Intravenous New Bag 5/2/17 0033)     And   magnesium sulfate 2g in water 50mL IVPB (premix) (2 g Intravenous New Bag 5/1/17 2323)       Current Discharge Medication List                Medical Decision Making    Medical Decision Making:   History:   Old Records Summarized: records from previous admission(s) and records from clinic visits.  Clinical Tests:   Lab Tests: Ordered and Reviewed           Scribe Attestation:   Scribe #1: I performed the above scribed service and the documentation accurately describes the services I performed. I attest to the accuracy of the note.    Attending:   Physician Attestation Statement for Scribe #1: I, Slim Julio MD, personally performed the services described in this  documentation, as scribed by Mariela Manzano, in my presence, and it is both accurate and complete.          Clinical Impression       ICD-10-CM ICD-9-CM   1. Preeclampsia, unspecified trimester O14.90 642.43   2. Hypertension I10 401.9       Disposition:   Disposition: Admitted  Condition: Thelma Julio MD  05/02/17 0447

## 2017-05-02 NOTE — PROGRESS NOTES
"Pt c/o constant, throbbing headache with pressure at the back of her head; pt verbalized that headache is "slightly better" when lying completely flat, but worse when sitting up; Pt requesting medication for headache; Dr. Ng notified of patient's blood pressures and constant, and now worsening, headache with no relief from last dose of percocet; will continue to monitor  "

## 2017-05-02 NOTE — ED NOTES
Spoke to the charge nurse in L&D and she states that the RN on the floor is ready to receive the patient.  Preparing to transport patient at this time.

## 2017-05-03 VITALS
TEMPERATURE: 98 F | OXYGEN SATURATION: 100 % | HEIGHT: 68 IN | WEIGHT: 196 LBS | SYSTOLIC BLOOD PRESSURE: 142 MMHG | BODY MASS INDEX: 29.7 KG/M2 | DIASTOLIC BLOOD PRESSURE: 81 MMHG | RESPIRATION RATE: 18 BRPM | HEART RATE: 89 BPM

## 2017-05-03 LAB
ALBUMIN SERPL BCP-MCNC: 2.5 G/DL
ALP SERPL-CCNC: 80 U/L
ALT SERPL W/O P-5'-P-CCNC: 24 U/L
ANION GAP SERPL CALC-SCNC: 7 MMOL/L
AST SERPL-CCNC: 21 U/L
BASOPHILS # BLD AUTO: 0.01 K/UL
BASOPHILS NFR BLD: 0.2 %
BILIRUB SERPL-MCNC: 0.4 MG/DL
BUN SERPL-MCNC: 10 MG/DL
CALCIUM SERPL-MCNC: 6.9 MG/DL
CHLORIDE SERPL-SCNC: 108 MMOL/L
CO2 SERPL-SCNC: 23 MMOL/L
CREAT SERPL-MCNC: 0.8 MG/DL
DIFFERENTIAL METHOD: ABNORMAL
EOSINOPHIL # BLD AUTO: 0.1 K/UL
EOSINOPHIL NFR BLD: 1.8 %
ERYTHROCYTE [DISTWIDTH] IN BLOOD BY AUTOMATED COUNT: 15.5 %
EST. GFR  (AFRICAN AMERICAN): >60 ML/MIN/1.73 M^2
EST. GFR  (NON AFRICAN AMERICAN): >60 ML/MIN/1.73 M^2
GLUCOSE SERPL-MCNC: 81 MG/DL
HCT VFR BLD AUTO: 24.3 %
HGB BLD-MCNC: 8 G/DL
LYMPHOCYTES # BLD AUTO: 1.4 K/UL
LYMPHOCYTES NFR BLD: 22.8 %
MCH RBC QN AUTO: 30.8 PG
MCHC RBC AUTO-ENTMCNC: 32.9 %
MCV RBC AUTO: 94 FL
MONOCYTES # BLD AUTO: 0.4 K/UL
MONOCYTES NFR BLD: 5.9 %
NEUTROPHILS # BLD AUTO: 4.1 K/UL
NEUTROPHILS NFR BLD: 69.3 %
PLATELET # BLD AUTO: 292 K/UL
PMV BLD AUTO: 10.1 FL
POTASSIUM SERPL-SCNC: 4.3 MMOL/L
PROT SERPL-MCNC: 6.3 G/DL
RBC # BLD AUTO: 2.6 M/UL
SODIUM SERPL-SCNC: 138 MMOL/L
WBC # BLD AUTO: 5.97 K/UL

## 2017-05-03 PROCEDURE — 36415 COLL VENOUS BLD VENIPUNCTURE: CPT

## 2017-05-03 PROCEDURE — 25000003 PHARM REV CODE 250: Performed by: OBSTETRICS & GYNECOLOGY

## 2017-05-03 PROCEDURE — 80053 COMPREHEN METABOLIC PANEL: CPT

## 2017-05-03 PROCEDURE — 85025 COMPLETE CBC W/AUTO DIFF WBC: CPT

## 2017-05-03 RX ADMIN — IBUPROFEN 600 MG: 600 TABLET, FILM COATED ORAL at 05:05

## 2017-05-03 RX ADMIN — OXYCODONE HYDROCHLORIDE AND ACETAMINOPHEN 1 TABLET: 10; 325 TABLET ORAL at 08:05

## 2017-05-03 RX ADMIN — OXYCODONE HYDROCHLORIDE AND ACETAMINOPHEN 1 TABLET: 10; 325 TABLET ORAL at 04:05

## 2017-05-03 RX ADMIN — IBUPROFEN 600 MG: 600 TABLET, FILM COATED ORAL at 12:05

## 2017-05-03 RX ADMIN — IBUPROFEN 600 MG: 600 TABLET, FILM COATED ORAL at 06:05

## 2017-05-03 RX ADMIN — OXYCODONE HYDROCHLORIDE AND ACETAMINOPHEN 1 TABLET: 10; 325 TABLET ORAL at 12:05

## 2017-05-03 NOTE — PLAN OF CARE
Problem: Patient Care Overview  Goal: Plan of Care Review  Outcome: Ongoing (interventions implemented as appropriate)  Patient progressing fine. . Pain controlled with medication.

## 2017-05-03 NOTE — LACTATION NOTE
Lactation rounds  Mother is felling better today. States that she is pumping to relieve her breasts. Reviewed milk production mechanism with mother; mother states that she is about to start pumping now. Denies any concerns at the moment.

## 2017-05-03 NOTE — PROGRESS NOTES
[facility] -   Obstetrics & Gynecology  Progress Note    Patient Name: Radha Vanegas  MRN: [id number]  Admission Date: 2017  Primary Care Provider: Anika Johnston MD  Principal Problem: Preeclampsia    Subjective:     HPI:  Radha Vanegas  presented to the ER c/o severe h/a, elevated blood pressures at home. Her reflexes were brisk per DrFerguson in the emergency room.  She is s/p repeat LTCS on 17.   Then later the same day had a D+C for PP hemorrhage and received 2 units pRBCs. EBL 400mL.  She also has a history of mild pre-eclampsia, a history of substance abuse, hepatitis B and C.       Interval History:  Currently denies headache since early this am; feels well; +breast, min lochia; no prob void, flatus, +bm  Denies blurry vision,seeing spots  Scheduled Meds:   hydrALAZINE  5 mg Intravenous Once    ibuprofen  600 mg Oral Q6H     Continuous Infusions:   magnesium sulfate in water Stopped (17 2335)     PRN Meds:calcium gluconate, oxycodone-acetaminophen, oxycodone-acetaminophen    Review of patient's allergies indicates:  No Known Allergies    Objective:     Vital Signs (Most Recent):  Temp: 97.7 °F (36.5 °C) (17 1600)  Pulse: 70 (17 1600)  Resp: 18 (17 1600)  BP: 131/82 (17 1600)  SpO2: 100 % (17 0409) Vital Signs (24h Range):  Temp:  [97.7 °F (36.5 °C)-99.1 °F (37.3 °C)] 97.7 °F (36.5 °C)  Pulse:  [70-87] 70  Resp:  [16-20] 18  SpO2:  [99 %-100 %] 100 %  BP: (119-145)/(66-96) 131/82     Weight: 88.9 kg (196 lb)  Body mass index is 29.8 kg/(m^2).  Patient's last menstrual period was 10/11/2016 (approximate).    I&O (Last 24H):      Intake/Output Summary (Last 24 hours) at 17 1810  Last data filed at 17 0400      Laboratory:  CBC:   Recent Labs  Lab 17  0820   WBC 5.97   RBC 2.60*   HGB 8.0*   HCT 24.3*      MCV 94   MCH 30.8   MCHC 32.9     CMP:   Recent Labs  Lab 17  1105   GLU 81   CALCIUM 6.9*   ALBUMIN  2.5*   PROT 6.3      K 4.3   CO2 23      BUN 10   CREATININE 0.8   ALKPHOS 80   ALT 24   AST 21   BILITOT 0.4       Diagnostic Results:  None    Physical Exam:   Constitutional: She is oriented to person, place, and time. She appears well-developed.    HENT:   Head: Normocephalic.    Eyes: Pupils are equal, round, and reactive to light.    Neck: Normal range of motion.    Cardiovascular: Normal rate and regular rhythm.     Pulmonary/Chest: Effort normal and breath sounds normal.        Abdominal: Soft. Bowel sounds are normal. She exhibits no abdominal incision (no errythema, exudate, induration).             Musculoskeletal: Normal range of motion.       Neurological: She is alert and oriented to person, place, and time.    Skin: Skin is warm and dry.    Psychiatric: She has a normal mood and affect. Her behavior is normal. Judgment and thought content normal.          Assessment/Plan:     * Preeclampsia  Magnesium sulfate was discontinued earlier this am.  Pt on initial exam this am (0700)--was tired and sleepy; c/o headache--posterior head  On pm rounds, pt requesting discharge; has ambulated without difficulty; and bp has remained normotensive without antihypertensives    Feel patient is stable for d/c home, plan f/u for bp check on monday    Laura Mishra MD  Obstetrics & Gynecology  [facility]

## 2017-05-03 NOTE — DISCHARGE SUMMARY
[facility] -   Obstetrics & Gynecology  Discharge Summary    Patient Name: Radha Vanegas  MRN: [id number]  Admission Date: 2017  Hospital Length of Stay: 2 days  Discharge Date and Time:  2017 6:20 PM  Attending Physician: Nidhi Oglesby MD   Discharging Provider: Laura Mishra MD  Primary Care Provider: Anika Johnston MD    HPI:  Radha Vanegas  presented to the ER c/o severe h/a, elevated blood pressures at home. Her reflexes were brisk per Langon in the emergency room.  She is s/p repeat LTCS on 17.   Then later the same day had a D+C for PP hemorrhage and received 2 units pRBCs. EBL 400mL.  She also has a history of mild pre-eclampsia, a history of substance abuse, hepatitis B and C.       Hospital Course:  Pt was admitted for 24hrs MgSO4 seizure prolphylaxis, blood pressure management, and close monitoring.    * No surgery found *     Consults         Status Ordering Provider     Inpatient consult to Obstetrics / Gynecology  Once     Provider:  (Not yet assigned)    Acknowledged JOSE HALEY          Significant Diagnostic Studies: Labs:   CMP   Recent Labs  Lab 17  1105    138   K 4.2 4.3   * 108   CO2 20* 23   GLU 81 81   BUN 13 10   CREATININE 0.8 0.8   CALCIUM 8.4* 6.9*   PROT 6.6 6.3   ALBUMIN 2.7* 2.5*   BILITOT 0.4 0.4   ALKPHOS 91 80   AST 32 21   ALT 30 24   ANIONGAP 9 7*   ESTGFRAFRICA >60 >60   EGFRNONAA >60 >60    and CBC   Recent Labs  Lab 17  0820   WBC 8.55 5.97   HGB 8.2* 8.0*   HCT 24.6* 24.3*    292       Pending Diagnostic Studies:     None        Final Active Diagnoses:    Diagnosis Date Noted POA    PRINCIPAL PROBLEM:  Preeclampsia [O14.90] 2017 Yes    Iron deficiency anemia [D50.9] 2017 Yes    Mild preeclampsia [O14.00] 2017 Yes    Hepatitis C [B19.20] 2017 Yes    Status post repeat low transverse  section [Z98.891] 2017 Not  Applicable    History of heroin abuse [Z87.898] 04/21/2016 Yes      Problems Resolved During this Admission:    Diagnosis Date Noted Date Resolved POA        Discharged Condition: good    Disposition: Home or Self Care    Follow Up:  Follow-up Information     Please follow up.    Why:  Nurse Visit, blood pressure check        Follow up with NURSE, OB-GYN In 5 days.    Specialty:  Obstetrics and Gynecology    Why:  bp ck        Patient Instructions:     Diet general     Call MD for:  temperature >100.4     Call MD for:  persistent nausea and vomiting or diarrhea     Call MD for:  severe uncontrolled pain     Call MD for:  redness, tenderness, or signs of infection (pain, swelling, redness, odor or green/yellow discharge around incision site)     Call MD for:  difficulty breathing or increased cough     Call MD for:  persistent dizziness, light-headedness, or visual disturbances     Other restrictions (specify):   Order Comments: Pelvic rest x 6 weeks (no tampons, intercourse douching); showers only for 6 wks; no lifting more than baby; call for headache, blurry vision, seeing spots     Call MD for:   Order Comments: Bleeding more than 1 pad per hour  Headache, blurry vision, seeing spots       Medications:  Reconciled Home Medications:   Current Discharge Medication List      CONTINUE these medications which have NOT CHANGED    Details   ibuprofen (ADVIL,MOTRIN) 600 MG tablet Take 1 tablet (600 mg total) by mouth every 6 (six) hours as needed for Pain.  Qty: 30 tablet, Refills: 1      oxycodone-acetaminophen (PERCOCET) 5-325 mg per tablet Take 1 tablet by mouth every 4 (four) hours as needed for Pain.  Qty: 30 tablet, Refills: 0         STOP taking these medications       prenatal vit #105-iron-FA-dha 30 mg iron- 1.4 mg-300 mg Cmpk Comments:   Reason for Stopping:               Laura Mishra MD  Obstetrics & Gynecology  [facility]

## 2017-05-03 NOTE — PLAN OF CARE
Problem: Patient Care Overview  Goal: Plan of Care Review  Outcome: Ongoing (interventions implemented as appropriate)  Patient is stable. Patient has stable VS. Patient is ambulating with no assistance.

## 2017-05-03 NOTE — ASSESSMENT & PLAN NOTE
Magnesium sulfate was discontinued earlier this am.  Pt on initial exam this am (0700)--was tired and sleepy; c/o headache--posterior head  On pm rounds, pt requesting discharge; has ambulated without difficulty; and bp has remained normotensive without antihypertensives

## 2017-05-08 ENCOUNTER — CLINICAL SUPPORT (OUTPATIENT)
Dept: OBSTETRICS AND GYNECOLOGY | Facility: CLINIC | Age: 27
End: 2017-05-08
Payer: MEDICAID

## 2017-05-08 VITALS — DIASTOLIC BLOOD PRESSURE: 92 MMHG | SYSTOLIC BLOOD PRESSURE: 140 MMHG

## 2017-05-08 PROCEDURE — 99999 PR PBB SHADOW E&M-EST. PATIENT-LVL I: CPT | Mod: PBBFAC,,,

## 2017-05-08 PROCEDURE — 99211 OFF/OP EST MAY X REQ PHY/QHP: CPT | Mod: PBBFAC

## 2017-05-08 NOTE — PROGRESS NOTES
Pt arrived to clinic for blood pressure check her reading was 140/92 she had some complaints of pain in the back of her headache that comes and goes.  Katiuska Salomon went in and spoke with patient regarding her blood pressure and a letter that she needs to return back to her drug program.  Pt advised pt to continue monitoring her blood pressure at home and to call the office if she gets a reading of 150/100 or greater, she's to take Ibuprofen for the intermittent pain she's having in her head and apply a warm compress per Katiuska Salomon and she was given a letter to return to her program.  She's to keep her scheduled post-op appointment wit Dr. Mcgill on 6/5/17 she voiced understanding.

## 2017-08-16 ENCOUNTER — TELEPHONE (OUTPATIENT)
Dept: GASTROENTEROLOGY | Facility: CLINIC | Age: 27
End: 2017-08-16

## 2017-08-16 NOTE — TELEPHONE ENCOUNTER
----- Message from Farooq Baca sent at 8/16/2017  9:33 AM CDT -----  Contact: PT   Pt called to reschedule her lab appointment, could not reschedule because The order for Hep B E antigen linked to a non fasting lab has already been scheduled but I don see any appointments, pt is requesting a call back [phone]

## 2017-08-17 ENCOUNTER — LAB VISIT (OUTPATIENT)
Dept: LAB | Facility: HOSPITAL | Age: 27
End: 2017-08-17
Attending: NURSE PRACTITIONER
Payer: MEDICAID

## 2017-08-17 DIAGNOSIS — B18.2 CHRONIC HEPATITIS C WITHOUT HEPATIC COMA: ICD-10-CM

## 2017-08-17 DIAGNOSIS — O98.419 HEPATITIS B AFFECTING PREGNANCY: ICD-10-CM

## 2017-08-17 DIAGNOSIS — B19.10 HEPATITIS B AFFECTING PREGNANCY: ICD-10-CM

## 2017-08-17 PROCEDURE — 86704 HEP B CORE ANTIBODY TOTAL: CPT

## 2017-08-17 PROCEDURE — 86790 VIRUS ANTIBODY NOS: CPT

## 2017-08-17 PROCEDURE — 87517 HEPATITIS B DNA QUANT: CPT

## 2017-08-17 PROCEDURE — 87350 HEPATITIS BE AG IA: CPT

## 2017-08-17 PROCEDURE — 86707 HEPATITIS BE ANTIBODY: CPT

## 2017-08-17 PROCEDURE — 86706 HEP B SURFACE ANTIBODY: CPT

## 2017-08-17 PROCEDURE — 87340 HEPATITIS B SURFACE AG IA: CPT

## 2017-08-18 LAB
HBV CORE AB SERPL QL IA: POSITIVE
HBV SURFACE AB SER-ACNC: ABNORMAL M[IU]/ML
HBV SURFACE AG SERPL QL IA: NEGATIVE
HEPATITIS A ANTIBODY, IGG: NEGATIVE

## 2017-08-22 LAB
HBV E AB SER QL: ABNORMAL
HBV E AG SPEC QL: NORMAL
HEPATITIS B VIRAL DNA - QUANTITATIVE: <10 IU/ML
HEPATITIS B VIRUS DNA: NOT DETECTED
LOG HBV IU/ML: <1 LOG (10) IU/ML

## 2017-08-28 ENCOUNTER — OFFICE VISIT (OUTPATIENT)
Dept: GASTROENTEROLOGY | Facility: CLINIC | Age: 27
End: 2017-08-28
Payer: MEDICAID

## 2017-08-28 VITALS
BODY MASS INDEX: 29.07 KG/M2 | HEIGHT: 68 IN | WEIGHT: 191.81 LBS | SYSTOLIC BLOOD PRESSURE: 140 MMHG | DIASTOLIC BLOOD PRESSURE: 84 MMHG | HEART RATE: 70 BPM

## 2017-08-28 DIAGNOSIS — B18.2 CHRONIC HEPATITIS C WITHOUT HEPATIC COMA: ICD-10-CM

## 2017-08-28 DIAGNOSIS — B18.1 CHRONIC HEPATITIS B: Primary | ICD-10-CM

## 2017-08-28 PROCEDURE — 99999 PR PBB SHADOW E&M-EST. PATIENT-LVL III: CPT | Mod: PBBFAC,,, | Performed by: NURSE PRACTITIONER

## 2017-08-28 PROCEDURE — 99214 OFFICE O/P EST MOD 30 MIN: CPT | Mod: S$PBB,,, | Performed by: NURSE PRACTITIONER

## 2017-08-28 PROCEDURE — 99213 OFFICE O/P EST LOW 20 MIN: CPT | Mod: PBBFAC,PO | Performed by: NURSE PRACTITIONER

## 2017-08-28 PROCEDURE — 3008F BODY MASS INDEX DOCD: CPT | Mod: ,,, | Performed by: NURSE PRACTITIONER

## 2017-08-28 NOTE — PROGRESS NOTES
Clinic Follow Up:  [facility] Follow Up Note    Reason for Follow Up:  The primary encounter diagnosis was Chronic hepatitis B. A diagnosis of Chronic hepatitis C without hepatic coma was also pertinent to this visit.    PCP: Anika Johnston       HPI:  This is a 27 y.o. female here for follow up of the above  Since her last visit, she has delivered her baby boy. SHe reports some complications with bleeding post-partum, all of which have resolved.  SHe states that she is overall feeling well without any complaints.   Recent labs show that she now has a Hep B Core ab positive, eAb positive, and a negative surface ag.     Denies any abdominal pain.  No nausea or vomiting.  No change in bowel pattern.  No melena or hematochezia. No weight loss.  No upper GI bleeding.  No ascites or BLE.  No overt confusion.      Review of Systems   Constitutional: Negative for chills, fever, malaise/fatigue and weight loss.   Respiratory: Negative for cough.    Cardiovascular: Negative for chest pain.   Gastrointestinal:        Per HPI   Musculoskeletal: Negative for myalgias.   Skin: Negative for itching and rash.   Neurological: Negative for headaches.   Psychiatric/Behavioral: The patient is not nervous/anxious.        Medical History:  Past Medical History:   Diagnosis Date    ADD (attention deficit disorder)     Anxiety     Depression     Hepatitis     Hep A,B,C    Hypertension     Migraine headache     Panic attack     Postpartum depression associated with first pregnancy     pt verbalized she was hospitalized       Surgical History:   Past Surgical History:   Procedure Laterality Date    APPENDECTOMY       SECTION      x1       Family History:   Family History   Problem Relation Age of Onset    Hypertension Mother     Breast cancer Neg Hx     Ovarian cancer Neg Hx     Deep vein thrombosis Neg Hx     Stomach cancer Neg Hx     Liver disease Neg Hx     Colon cancer Neg Hx        Social  "History:   Social History   Substance Use Topics    Smoking status: Current Some Day Smoker     Packs/day: 0.25     Types: Cigarettes    Smokeless tobacco: Never Used    Alcohol use No       Allergies: Reviewed    Home Medications:  Current Outpatient Prescriptions on File Prior to Visit   Medication Sig Dispense Refill    ibuprofen (ADVIL,MOTRIN) 600 MG tablet Take 1 tablet (600 mg total) by mouth every 6 (six) hours as needed for Pain. 30 tablet 1    oxycodone-acetaminophen (PERCOCET) 5-325 mg per tablet Take 1 tablet by mouth every 4 (four) hours as needed for Pain. 30 tablet 0     No current facility-administered medications on file prior to visit.        Physical Exam:  Vital Signs:  BP (!) 140/84   Pulse 70   Ht 5' 8" (1.727 m)   Wt 87 kg (191 lb 12.8 oz)   LMP 08/21/2017 (Exact Date)   Breastfeeding? No   BMI 29.16 kg/m²   Body mass index is 29.16 kg/m².  Physical Exam   Constitutional: She is oriented to person, place, and time. She appears well-developed and well-nourished.   HENT:   Head: Normocephalic.   Eyes: No scleral icterus.   Neck: Normal range of motion.   Cardiovascular: Normal rate and regular rhythm.    Pulmonary/Chest: Effort normal and breath sounds normal.   Abdominal: Soft. Bowel sounds are normal. She exhibits no distension. There is no tenderness.   Musculoskeletal: Normal range of motion.   Neurological: She is alert and oriented to person, place, and time.   Skin: Skin is warm and dry.   Psychiatric: She has a normal mood and affect.   Vitals reviewed.      Labs: Pertinent labs reviewed.      Assessment:  1. Chronic hepatitis B    2. Chronic hepatitis C without hepatic coma        Recommendations:  - Possible conversion to immune to Hep B given her recent labs.  WIll repeat in 4 weeks  - Pt would be a good candidate for treatment of HCV.  Further labs and imaging needed to determine best treatment options.   Chronic hepatitis B  -     CBC auto differential; Future; Expected " date: 08/28/2017  -     Comprehensive metabolic panel; Future; Expected date: 08/28/2017  -     Protime-INR; Future; Expected date: 08/28/2017  -     HEPATITIS B VIRAL DNA, QUANTITATIVE; Future; Expected date: 08/28/2017  -     Hepatitis B e antibody; Future; Expected date: 08/28/2017  -     US Abdomen Complete; Future; Expected date: 08/28/2017  -     Hepatitis B surface antibody; Future; Expected date: 08/28/2017  -     Hepatitis B surface antigen; Future; Expected date: 08/28/2017  -     Hepatitis B core antibody, total; Future; Expected date: 08/28/2017  -     Hepatitis C RNA, quantitative, PCR; Future; Expected date: 08/28/2017  -     HCV FibroSURE; Future; Expected date: 08/28/2017  -     Hepatitis C genotype; Future; Expected date: 08/28/2017  -     HIV-1 and HIV-2 antibodies; Future; Expected date: 08/28/2017    Chronic hepatitis C without hepatic coma  -     CBC auto differential; Future; Expected date: 08/28/2017  -     Comprehensive metabolic panel; Future; Expected date: 08/28/2017  -     Protime-INR; Future; Expected date: 08/28/2017  -     HEPATITIS B VIRAL DNA, QUANTITATIVE; Future; Expected date: 08/28/2017  -     Hepatitis B e antibody; Future; Expected date: 08/28/2017  -     US Abdomen Complete; Future; Expected date: 08/28/2017  -     Hepatitis B surface antibody; Future; Expected date: 08/28/2017  -     Hepatitis B surface antigen; Future; Expected date: 08/28/2017  -     Hepatitis B core antibody, total; Future; Expected date: 08/28/2017  -     Hepatitis C RNA, quantitative, PCR; Future; Expected date: 08/28/2017  -     HCV FibroSURE; Future; Expected date: 08/28/2017  -     Hepatitis C genotype; Future; Expected date: 08/28/2017  -     HIV-1 and HIV-2 antibodies; Future; Expected date: 08/28/2017        Return to Clinic:    Follow up to be determined by results of above.

## 2017-08-30 ENCOUNTER — TELEPHONE (OUTPATIENT)
Dept: RADIOLOGY | Facility: HOSPITAL | Age: 27
End: 2017-08-30

## 2017-09-12 ENCOUNTER — APPOINTMENT (OUTPATIENT)
Dept: RADIOLOGY | Facility: HOSPITAL | Age: 27
End: 2017-09-12
Attending: NURSE PRACTITIONER
Payer: MEDICAID

## 2017-09-12 DIAGNOSIS — B18.1 CHRONIC HEPATITIS B: ICD-10-CM

## 2017-09-12 DIAGNOSIS — B18.2 CHRONIC HEPATITIS C WITHOUT HEPATIC COMA: ICD-10-CM

## 2017-09-12 PROCEDURE — 76700 US EXAM ABDOM COMPLETE: CPT | Mod: TC,PO

## 2017-09-12 PROCEDURE — 76700 US EXAM ABDOM COMPLETE: CPT | Mod: 26,,, | Performed by: RADIOLOGY

## 2017-09-26 ENCOUNTER — OFFICE VISIT (OUTPATIENT)
Dept: OBSTETRICS AND GYNECOLOGY | Facility: CLINIC | Age: 27
End: 2017-09-26
Payer: MEDICAID

## 2017-09-26 VITALS
HEIGHT: 68 IN | WEIGHT: 194.88 LBS | DIASTOLIC BLOOD PRESSURE: 86 MMHG | BODY MASS INDEX: 29.54 KG/M2 | SYSTOLIC BLOOD PRESSURE: 126 MMHG

## 2017-09-26 DIAGNOSIS — Z30.09 CONSULTATION FOR STERILIZATION: ICD-10-CM

## 2017-09-26 DIAGNOSIS — Z12.4 PAP SMEAR FOR CERVICAL CANCER SCREENING: ICD-10-CM

## 2017-09-26 DIAGNOSIS — K43.2 INCISIONAL HERNIA, WITHOUT OBSTRUCTION OR GANGRENE: Primary | ICD-10-CM

## 2017-09-26 PROBLEM — O14.00 MILD PREECLAMPSIA: Status: RESOLVED | Noted: 2017-04-25 | Resolved: 2017-09-26

## 2017-09-26 PROBLEM — B19.10 HEPATITIS B AFFECTING PREGNANCY: Status: RESOLVED | Noted: 2017-03-09 | Resolved: 2017-09-26

## 2017-09-26 PROBLEM — O98.419 HEPATITIS B AFFECTING PREGNANCY: Status: RESOLVED | Noted: 2017-03-09 | Resolved: 2017-09-26

## 2017-09-26 PROBLEM — O14.90 PREECLAMPSIA: Status: RESOLVED | Noted: 2017-05-01 | Resolved: 2017-09-26

## 2017-09-26 PROBLEM — Z98.891 STATUS POST REPEAT LOW TRANSVERSE CESAREAN SECTION: Status: RESOLVED | Noted: 2017-03-09 | Resolved: 2017-09-26

## 2017-09-26 PROCEDURE — 99213 OFFICE O/P EST LOW 20 MIN: CPT | Mod: S$PBB,,, | Performed by: OBSTETRICS & GYNECOLOGY

## 2017-09-26 PROCEDURE — 88175 CYTOPATH C/V AUTO FLUID REDO: CPT

## 2017-09-26 PROCEDURE — 99213 OFFICE O/P EST LOW 20 MIN: CPT | Mod: PBBFAC | Performed by: OBSTETRICS & GYNECOLOGY

## 2017-09-26 PROCEDURE — 99999 PR PBB SHADOW E&M-EST. PATIENT-LVL III: CPT | Mod: PBBFAC,,, | Performed by: OBSTETRICS & GYNECOLOGY

## 2017-09-26 PROCEDURE — 3008F BODY MASS INDEX DOCD: CPT | Mod: ,,, | Performed by: OBSTETRICS & GYNECOLOGY

## 2017-09-26 NOTE — PROGRESS NOTES
Subjective:       Patient ID: Radha Vanegas is a 27 y.o. female.    Chief Complaint:  Follow-up      History of Present Illness  HPI  Pt reports complaints of feeling a bulge inside of her C/S incision for the past month.  Pt has also noted intermittent pulling sensations in same time frame.  Pt is s/p RLTCS with Dr. Mcgill on 2017 which was followed by a D+C due to hemorrhage.  Denies fevers, nausea, vomiting, diarrhea.  Lastly, pt is done with her fertility and would like to discuss sterilization options.    GYN & OB History  Patient's last menstrual period was 2017 (exact date).   Date of Last Pap: 3/8/2014    OB History    Para Term  AB Living   4 3 3   1 2   SAB TAB Ectopic Multiple Live Births     1   0 2      # Outcome Date GA Lbr Brandon/2nd Weight Sex Delivery Anes PTL Lv   4 Term 17 38w2d  2.81 kg (6 lb 3.1 oz) M CS-LTranv Spinal N MARCIANO   3 Term 14 41w2d  4.321 kg (9 lb 8.4 oz) F CS-LTranv Spinal N MARCIANO   2 Term 13 39w5d  3.544 kg (7 lb 13 oz) M CS-Unspec      1 TAB                   Review of Systems  Review of Systems   Constitutional: Negative for activity change, appetite change, fatigue, fever and unexpected weight change.   Respiratory: Negative for shortness of breath.    Cardiovascular: Negative for chest pain, palpitations and leg swelling.   Gastrointestinal: Positive for abdominal pain. Negative for constipation, diarrhea, nausea and vomiting.   Genitourinary: Positive for pelvic pain. Negative for dysuria, genital sores, hematuria, menorrhagia, menstrual problem, vaginal bleeding, vaginal discharge, vaginal pain and vaginal odor.   Musculoskeletal: Negative for back pain.   Neurological: Negative for syncope and headaches.   Breast: Negative for breast mass, breast pain, nipple discharge and skin changes          Objective:    Physical Exam:   Constitutional: She is oriented to person, place, and time. She appears well-developed and well-nourished.  No distress.       Cardiovascular: Normal rate, regular rhythm and normal heart sounds.     Pulmonary/Chest: Effort normal and breath sounds normal.        Abdominal: Soft. Bowel sounds are normal. She exhibits abdominal incision. She exhibits no distension and no mass. There is no tenderness. There is no rebound and no guarding. A hernia is present.                     Neurological: She is alert and oriented to person, place, and time.     Psychiatric: She has a normal mood and affect. Her behavior is normal. Thought content normal.          Assessment:        1. Incisional hernia, without obstruction or gangrene    2. Consultation for sterilization    3. Pap smear for cervical cancer screening              Plan:      Incisional hernia, without obstruction or gangrene  -     Ambulatory Referral to General Surgery  -     Clinical presentation and exam findings suggestive of midline, reducible, incisional hernia.  Recommend evaluation with General Surgery.     Consultation for sterilization  -     Pt was counseled on fertility planning options, including contraception and sterilization options.  Pt is done with her fertility and desires permanent sterilization.  Surgery details, indications, risks, benefits, and alternatives were discussed.  Medicaid consents were reviewed with pt and signed.  Will await surgery evaluation.  If surgery is deemed necessary to correct hernia, may be able to combine surgery with LSC BTL.  Pt voiced understanding and will call back when she makes decision.    Pap smear for cervical cancer screening  -     Liquid-based pap smear, screening  -     Pt was counseled on cervical/vaginal screening guidelines and recommendations.  Last pap NILM on 2014.  If today's pap smear result is negative, next pap smear will be due in 2020.  -     Follow up with PCP for routine health maintenance needs.

## 2017-09-27 ENCOUNTER — TELEPHONE (OUTPATIENT)
Dept: OBSTETRICS AND GYNECOLOGY | Facility: CLINIC | Age: 27
End: 2017-09-27

## 2017-09-27 ENCOUNTER — LAB VISIT (OUTPATIENT)
Dept: LAB | Facility: HOSPITAL | Age: 27
End: 2017-09-27
Attending: NURSE PRACTITIONER
Payer: MEDICAID

## 2017-09-27 DIAGNOSIS — B18.1 CHRONIC HEPATITIS B: ICD-10-CM

## 2017-09-27 DIAGNOSIS — B18.2 CHRONIC HEPATITIS C WITHOUT HEPATIC COMA: ICD-10-CM

## 2017-09-27 LAB
ALBUMIN SERPL BCP-MCNC: 3.6 G/DL
ALP SERPL-CCNC: 58 U/L
ALT SERPL W/O P-5'-P-CCNC: 140 U/L
ANION GAP SERPL CALC-SCNC: 7 MMOL/L
AST SERPL-CCNC: 71 U/L
BASOPHILS # BLD AUTO: 0.03 K/UL
BASOPHILS NFR BLD: 0.8 %
BILIRUB SERPL-MCNC: 0.3 MG/DL
BUN SERPL-MCNC: 17 MG/DL
CALCIUM SERPL-MCNC: 8.8 MG/DL
CHLORIDE SERPL-SCNC: 109 MMOL/L
CO2 SERPL-SCNC: 22 MMOL/L
CREAT SERPL-MCNC: 0.7 MG/DL
DIFFERENTIAL METHOD: ABNORMAL
EOSINOPHIL # BLD AUTO: 0.3 K/UL
EOSINOPHIL NFR BLD: 7.1 %
ERYTHROCYTE [DISTWIDTH] IN BLOOD BY AUTOMATED COUNT: 18.6 %
EST. GFR  (AFRICAN AMERICAN): >60 ML/MIN/1.73 M^2
EST. GFR  (NON AFRICAN AMERICAN): >60 ML/MIN/1.73 M^2
GLUCOSE SERPL-MCNC: 85 MG/DL
HCT VFR BLD AUTO: 34.6 %
HGB BLD-MCNC: 11.5 G/DL
INR PPP: 0.9
LYMPHOCYTES # BLD AUTO: 1.5 K/UL
LYMPHOCYTES NFR BLD: 41.2 %
MCH RBC QN AUTO: 27.3 PG
MCHC RBC AUTO-ENTMCNC: 33.2 G/DL
MCV RBC AUTO: 82 FL
MONOCYTES # BLD AUTO: 0.4 K/UL
MONOCYTES NFR BLD: 11.9 %
NEUTROPHILS # BLD AUTO: 1.4 K/UL
NEUTROPHILS NFR BLD: 39 %
PLATELET # BLD AUTO: 218 K/UL
PMV BLD AUTO: 10.6 FL
POTASSIUM SERPL-SCNC: 4 MMOL/L
PROT SERPL-MCNC: 7.2 G/DL
PROTHROMBIN TIME: 10.3 SEC
RBC # BLD AUTO: 4.22 M/UL
SODIUM SERPL-SCNC: 138 MMOL/L
WBC # BLD AUTO: 3.54 K/UL

## 2017-09-27 PROCEDURE — 86703 HIV-1/HIV-2 1 RESULT ANTBDY: CPT

## 2017-09-27 PROCEDURE — 87340 HEPATITIS B SURFACE AG IA: CPT

## 2017-09-27 PROCEDURE — 86707 HEPATITIS BE ANTIBODY: CPT

## 2017-09-27 PROCEDURE — 85610 PROTHROMBIN TIME: CPT | Mod: PO

## 2017-09-27 PROCEDURE — 87517 HEPATITIS B DNA QUANT: CPT

## 2017-09-27 PROCEDURE — 87522 HEPATITIS C REVRS TRNSCRPJ: CPT

## 2017-09-27 PROCEDURE — 85025 COMPLETE CBC W/AUTO DIFF WBC: CPT

## 2017-09-27 PROCEDURE — 80053 COMPREHEN METABOLIC PANEL: CPT

## 2017-09-27 PROCEDURE — 82247 BILIRUBIN TOTAL: CPT

## 2017-09-27 PROCEDURE — 36415 COLL VENOUS BLD VENIPUNCTURE: CPT | Mod: PO

## 2017-09-27 PROCEDURE — 86704 HEP B CORE ANTIBODY TOTAL: CPT

## 2017-09-27 PROCEDURE — 86706 HEP B SURFACE ANTIBODY: CPT

## 2017-09-27 PROCEDURE — 87902 NFCT AGT GNTYP ALYS HEP C: CPT

## 2017-09-27 NOTE — TELEPHONE ENCOUNTER
----- Message from Marylou Onofre sent at 9/27/2017 12:07 PM CDT -----  Contact: Pt  Pt is requesting to speak to the nurse. Pt states that she has a hernia in her incision that's causing a lot of pain. Pls call pt back at [phone].

## 2017-09-27 NOTE — TELEPHONE ENCOUNTER
Patient informed that we have a 1:45pm available on tomorrow.  Patient voiced understanding and appointment booked.

## 2017-09-27 NOTE — TELEPHONE ENCOUNTER
----- Message from Deisy Rebollar sent at 9/27/2017  4:06 PM CDT -----  Contact: pt   Call pt regarding questions about her appt.   ..[phone] (home) [phone] (work)

## 2017-09-28 LAB
HBV CORE AB SERPL QL IA: POSITIVE
HBV SURFACE AB SER-ACNC: NEGATIVE M[IU]/ML
HBV SURFACE AG SERPL QL IA: NEGATIVE
HIV 1+2 AB+HIV1 P24 AG SERPL QL IA: NEGATIVE

## 2017-09-29 LAB
HBV E AB SER QL: ABNORMAL
HEPATITIS B VIRAL DNA - QUANTITATIVE: <10 IU/ML
HEPATITIS B VIRUS DNA: NOT DETECTED
LOG HBV IU/ML: <1 LOG (10) IU/ML

## 2017-10-02 LAB
HCV GENTYP SERPL NAA+PROBE: ABNORMAL
HCV QUALITATIVE RESULT: DETECTED
HCV QUANTITATIVE LOG: 5.43 LOG (10) IU/ML
HCV RNA SPEC NAA+PROBE-ACNC: ABNORMAL IU/ML

## 2017-10-03 LAB
A2 MACROGLOB SERPL-MCNC: 231 MG/DL (ref 106–279)
ALT SERPL W P-5'-P-CCNC: 117 U/L (ref 6–29)
APO A-I SERPL-MCNC: 154 MG/DL (ref 101–198)
BILIRUB SERPL-MCNC: 0.2 MG/DL (ref 0.2–1.2)
FIBROSIS STAGE SERPL QL: ABNORMAL
FIBROTEST INTERPRETATION: ABNORMAL
FOOTNOTE: ABNORMAL
GGT SERPL-CCNC: 20 U/L (ref 3–40)
HAPTOGLOB SERPL-MCNC: 111 MG/DL (ref 43–212)
LIVER FIBR SCORE SERPL CALC.FIBROSURE: 0.06
NECROINFLAMMAT INTERP: ABNORMAL
NECROINFLAMMATORY ACT GRADE SERPL QL: ABNORMAL
NECROINFLAMMATORY ACT SCORE SERPL: 0.54
REFERENCE ID: ABNORMAL

## 2017-10-20 ENCOUNTER — TELEPHONE (OUTPATIENT)
Dept: GASTROENTEROLOGY | Facility: CLINIC | Age: 27
End: 2017-10-20

## 2017-10-20 NOTE — TELEPHONE ENCOUNTER
Returned call. Informed pt next availability for Medicaid apts is in 2018. Recommended she call [facility]. Gave her the number.

## 2017-11-14 ENCOUNTER — TELEPHONE (OUTPATIENT)
Dept: GASTROENTEROLOGY | Facility: CLINIC | Age: 27
End: 2017-11-14

## 2017-11-14 NOTE — TELEPHONE ENCOUNTER
----- Message from Toñito Lin sent at 11/14/2017  9:54 AM CST -----  Contact: self [phone]  Would like to consult with nurse regarding treatment.  Please call back at [phone].  Md Kay

## 2017-11-14 NOTE — TELEPHONE ENCOUNTER
----- Message from Chely Simms sent at 11/14/2017 10:30 AM CST -----  Contact: Patient  Patient returned call to Roselyn. She can be contacted at [phone].    Thanks,  Chely

## 2017-12-15 ENCOUNTER — OFFICE VISIT (OUTPATIENT)
Dept: GASTROENTEROLOGY | Facility: CLINIC | Age: 27
End: 2017-12-15
Payer: MEDICAID

## 2017-12-15 VITALS
DIASTOLIC BLOOD PRESSURE: 88 MMHG | HEART RATE: 90 BPM | BODY MASS INDEX: 28.15 KG/M2 | HEIGHT: 70 IN | SYSTOLIC BLOOD PRESSURE: 124 MMHG | WEIGHT: 196.63 LBS

## 2017-12-15 DIAGNOSIS — R76.8 HEPATITIS B CORE ANTIBODY POSITIVE: ICD-10-CM

## 2017-12-15 DIAGNOSIS — D64.9 ANEMIA, UNSPECIFIED TYPE: ICD-10-CM

## 2017-12-15 DIAGNOSIS — B18.2 CHRONIC HEPATITIS C WITHOUT HEPATIC COMA: Primary | ICD-10-CM

## 2017-12-15 DIAGNOSIS — R53.83 FATIGUE, UNSPECIFIED TYPE: ICD-10-CM

## 2017-12-15 PROCEDURE — 99214 OFFICE O/P EST MOD 30 MIN: CPT | Mod: S$PBB,,, | Performed by: NURSE PRACTITIONER

## 2017-12-15 PROCEDURE — 99213 OFFICE O/P EST LOW 20 MIN: CPT | Mod: PBBFAC,PO | Performed by: NURSE PRACTITIONER

## 2017-12-15 PROCEDURE — 99999 PR PBB SHADOW E&M-EST. PATIENT-LVL III: CPT | Mod: PBBFAC,,, | Performed by: NURSE PRACTITIONER

## 2017-12-15 NOTE — PROGRESS NOTES
Clinic Follow Up:  [facility] Follow Up Note    Reason for Follow Up:  The primary encounter diagnosis was Chronic hepatitis C without hepatic coma. Diagnoses of Hepatitis B core antibody positive, Fatigue, unspecified type, and Anemia, unspecified type were also pertinent to this visit.    PCP: Anika Johnston       HPI:  This is a 27 y.o. female here for follow up of the above  She states that she has overall been feeling well without any major complaints with the exception of severe fatigue.    She has been maintaining her sobriety for over 15 months.   Denies any abdominal pain.  No nausea or vomiting.  No change in bowel pattern.  No melena or hematochezia. No weight loss.  No upper GI bleeding.  No ascites or BLE.  No overt confusion.      Review of Systems   Constitutional: Negative for chills, fever, malaise/fatigue and weight loss.   Respiratory: Negative for cough.    Cardiovascular: Negative for chest pain.   Gastrointestinal:        Per HPI   Musculoskeletal: Negative for myalgias.   Skin: Negative for itching and rash.   Neurological: Negative for headaches.   Psychiatric/Behavioral: The patient is not nervous/anxious.        Medical History:  Past Medical History:   Diagnosis Date    ADD (attention deficit disorder)     Anxiety     Depression     Hepatitis     Hep A,B,C    Hypertension     Migraine headache     Panic attack     Postpartum depression associated with first pregnancy     pt verbalized she was hospitalized       Surgical History:   Past Surgical History:   Procedure Laterality Date    APPENDECTOMY       SECTION      x1       Family History:   Family History   Problem Relation Age of Onset    Hypertension Mother     Breast cancer Neg Hx     Ovarian cancer Neg Hx     Deep vein thrombosis Neg Hx     Stomach cancer Neg Hx     Liver disease Neg Hx     Colon cancer Neg Hx        Social History:   Social History   Substance Use Topics    Smoking status:  "Current Some Day Smoker     Packs/day: 0.25     Types: Cigarettes    Smokeless tobacco: Never Used    Alcohol use No       Allergies: Reviewed    Home Medications:  Current Outpatient Prescriptions on File Prior to Visit   Medication Sig Dispense Refill    ibuprofen (ADVIL,MOTRIN) 600 MG tablet Take 1 tablet (600 mg total) by mouth every 6 (six) hours as needed for Pain. 30 tablet 1    [DISCONTINUED] oxycodone-acetaminophen (PERCOCET) 5-325 mg per tablet Take 1 tablet by mouth every 4 (four) hours as needed for Pain. 30 tablet 0     No current facility-administered medications on file prior to visit.        Physical Exam:  Vital Signs:  /88   Pulse 90   Ht 5' 9.6" (1.768 m)   Wt 89.2 kg (196 lb 10.4 oz)   BMI 28.54 kg/m²   Body mass index is 28.54 kg/m².  Physical Exam   Constitutional: She is oriented to person, place, and time. She appears well-developed and well-nourished.   HENT:   Head: Normocephalic.   Eyes: No scleral icterus.   Neck: Normal range of motion.   Cardiovascular: Normal rate and regular rhythm.    Pulmonary/Chest: Effort normal and breath sounds normal.   Abdominal: Soft. Bowel sounds are normal. She exhibits no distension. There is no tenderness.   Musculoskeletal: Normal range of motion.   Neurological: She is alert and oriented to person, place, and time.   Skin: Skin is warm and dry.   Psychiatric: She has a normal mood and affect.   Vitals reviewed.      Labs: Pertinent labs reviewed.      Assessment:  1. Chronic hepatitis C without hepatic coma    2. Hepatitis B core antibody positive    3. Fatigue, unspecified type    4. Anemia, unspecified type        Recommendations:  - Will update viral hepatitis labs  - Given that she is having worsening fatigue and is of Child-bearing age with intent to have more children, will attempt to treat HCV.   - Iron studies to evaluate for additional causes of the fatigue.   Chronic hepatitis C without hepatic coma  -     CBC auto differential; " Future; Expected date: 12/15/2017  -     Comprehensive metabolic panel; Future; Expected date: 12/15/2017  -     Iron and TIBC; Future; Expected date: 12/15/2017  -     Ferritin; Future; Expected date: 12/15/2017  -     Hepatitis B core antibody, total; Future; Expected date: 12/15/2017  -     Hepatitis B surface antigen; Future; Expected date: 12/15/2017  -     Hepatitis B surface antibody; Future; Expected date: 12/15/2017  -     Hepatitis A antibody, IgG; Future; Expected date: 12/15/2017  -     Hepatitis C RNA, quantitative, PCR; Future; Expected date: 12/15/2017    Hepatitis B core antibody positive  -     CBC auto differential; Future; Expected date: 12/15/2017  -     Comprehensive metabolic panel; Future; Expected date: 12/15/2017  -     Iron and TIBC; Future; Expected date: 12/15/2017  -     Ferritin; Future; Expected date: 12/15/2017  -     Hepatitis B core antibody, total; Future; Expected date: 12/15/2017  -     Hepatitis B surface antigen; Future; Expected date: 12/15/2017  -     Hepatitis B surface antibody; Future; Expected date: 12/15/2017  -     Hepatitis A antibody, IgG; Future; Expected date: 12/15/2017  -     Hepatitis C RNA, quantitative, PCR; Future; Expected date: 12/15/2017    Fatigue, unspecified type  -     CBC auto differential; Future; Expected date: 12/15/2017  -     Comprehensive metabolic panel; Future; Expected date: 12/15/2017  -     Iron and TIBC; Future; Expected date: 12/15/2017  -     Ferritin; Future; Expected date: 12/15/2017  -     Hepatitis B core antibody, total; Future; Expected date: 12/15/2017  -     Hepatitis B surface antigen; Future; Expected date: 12/15/2017  -     Hepatitis B surface antibody; Future; Expected date: 12/15/2017  -     Hepatitis A antibody, IgG; Future; Expected date: 12/15/2017  -     Hepatitis C RNA, quantitative, PCR; Future; Expected date: 12/15/2017    Anemia, unspecified type  -     CBC auto differential; Future; Expected date: 12/15/2017  -      Comprehensive metabolic panel; Future; Expected date: 12/15/2017  -     Iron and TIBC; Future; Expected date: 12/15/2017  -     Ferritin; Future; Expected date: 12/15/2017  -     Hepatitis B core antibody, total; Future; Expected date: 12/15/2017  -     Hepatitis B surface antigen; Future; Expected date: 12/15/2017  -     Hepatitis B surface antibody; Future; Expected date: 12/15/2017  -     Hepatitis A antibody, IgG; Future; Expected date: 12/15/2017  -     Hepatitis C RNA, quantitative, PCR; Future; Expected date: 12/15/2017        Return to Clinic:  Follow up to be determined by results of above.

## 2019-01-03 PROCEDURE — 99231 SBSQ HOSP IP/OBS SF/LOW 25: CPT | Mod: ,,, | Performed by: PHYSICIAN ASSISTANT

## 2019-01-03 PROCEDURE — 99231 PR SUBSEQUENT HOSPITAL CARE,LEVL I: ICD-10-PCS | Mod: ,,, | Performed by: PHYSICIAN ASSISTANT

## 2019-01-04 PROCEDURE — 99238 HOSP IP/OBS DSCHRG MGMT 30/<: CPT | Mod: ,,, | Performed by: OBSTETRICS & GYNECOLOGY

## 2019-01-04 PROCEDURE — 99238 PR HOSPITAL DISCHARGE DAY,<30 MIN: ICD-10-PCS | Mod: ,,, | Performed by: OBSTETRICS & GYNECOLOGY

## 2019-04-10 ENCOUNTER — OFFICE VISIT (OUTPATIENT)
Dept: OBSTETRICS AND GYNECOLOGY | Facility: CLINIC | Age: 29
End: 2019-04-10
Payer: MEDICAID

## 2019-04-10 VITALS
SYSTOLIC BLOOD PRESSURE: 126 MMHG | HEIGHT: 70 IN | WEIGHT: 179.25 LBS | DIASTOLIC BLOOD PRESSURE: 82 MMHG | BODY MASS INDEX: 25.66 KG/M2

## 2019-04-10 DIAGNOSIS — N89.8 VAGINAL CYST: Primary | ICD-10-CM

## 2019-04-10 PROCEDURE — 99213 PR OFFICE/OUTPT VISIT, EST, LEVL III, 20-29 MIN: ICD-10-PCS | Mod: S$PBB,,, | Performed by: NURSE PRACTITIONER

## 2019-04-10 PROCEDURE — 99212 OFFICE O/P EST SF 10 MIN: CPT | Mod: PBBFAC | Performed by: NURSE PRACTITIONER

## 2019-04-10 PROCEDURE — 99213 OFFICE O/P EST LOW 20 MIN: CPT | Mod: S$PBB,,, | Performed by: NURSE PRACTITIONER

## 2019-04-10 PROCEDURE — 99999 PR PBB SHADOW E&M-EST. PATIENT-LVL II: ICD-10-PCS | Mod: PBBFAC,,, | Performed by: NURSE PRACTITIONER

## 2019-04-10 PROCEDURE — 99999 PR PBB SHADOW E&M-EST. PATIENT-LVL II: CPT | Mod: PBBFAC,,, | Performed by: NURSE PRACTITIONER

## 2019-04-10 NOTE — PROGRESS NOTES
CC: Vaginal lesion    Radha Vanegas is a 29 y.o. female  presents for  Painless,vaginal lesion.  LMP: Patient's last menstrual period was 2019..  No h/p HSV. Denies pelvic pain.       Past Medical History:   Diagnosis Date    ADD (attention deficit disorder)     Anxiety     Depression     Hepatitis     Hep A,B,C    Hypertension     Migraine headache     Panic attack     Postpartum depression associated with first pregnancy     pt verbalized she was hospitalized     Past Surgical History:   Procedure Laterality Date    APPENDECTOMY       SECTION      x1    DELIVERY-CEASAREAN SECTION N/A 2014    [facility]    DELIVERY- SECTION N/A 2017    Performed by Cec[facility]gill MD at [facility] L&D     Social History     Socioeconomic History    Marital status: Single     Spouse name: Not on file    Number of children: Not on file    Years of education: Not on file    Highest education level: Not on file   Occupational History    Not on file   Social Needs    Financial resource strain: Not on file    Food insecurity:     Worry: Not on file     Inability: Not on file    Transportation needs:     Medical: Not on file     Non-medical: Not on file   Tobacco Use    Smoking status: Current Some Day Smoker     Packs/day: 0.25     Types: Cigarettes    Smokeless tobacco: Never Used   Substance and Sexual Activity    Alcohol use: Yes     Comment: OCC    Drug use: No     Comment: previous drug user (heroin, meth)    Sexual activity: Yes     Partners: Male     Birth control/protection: None   Lifestyle    Physical activity:     Days per week: Not on file     Minutes per session: Not on file    Stress: Not on file   Relationships    Social connections:     Talks on phone: Not on file     Gets together: Not on file     Attends Religion service: Not on file     Active member of club or organization: Not on file     Attends meetings of clubs or  "organizations: Not on file     Relationship status: Not on file   Other Topics Concern    Not on file   Social History Narrative    Not on file     Family History   Problem Relation Age of Onset    Hypertension Mother     Breast cancer Neg Hx     Ovarian cancer Neg Hx     Deep vein thrombosis Neg Hx     Stomach cancer Neg Hx     Liver disease Neg Hx     Colon cancer Neg Hx      OB History        4    Para   3    Term   3            AB   1    Living   2       SAB        TAB   1    Ectopic        Multiple   0    Live Births   2                 /82   Ht 5' 9.6" (1.768 m)   Wt 81.3 kg (179 lb 3.7 oz)   LMP 2019   Breastfeeding? No   BMI 26.01 kg/m²       ROS:  GENERAL: Denies weight gain or weight loss. Feeling well overall.   SKIN: HPI  REPRODUCTIVE: See HPI.     PHYSICAL EXAM:  APPEARANCE: Well nourished, well developed, in no acute distress.  AFFECT: WNL, alert and oriented x 3  PELVIC: Eternal genitalia, small Sebaceous cyst, left labia    1. Vaginal cyst      PLAN:  Patient was reassured and will monitor            "

## 2019-05-23 ENCOUNTER — TELEPHONE (OUTPATIENT)
Dept: OBSTETRICS AND GYNECOLOGY | Facility: CLINIC | Age: 29
End: 2019-05-23

## 2019-05-23 NOTE — TELEPHONE ENCOUNTER
Spoke to patient and scheduled an appointment for 05/27/19 at 10:20am to see NAVEEN Salinas at the [place] location.

## 2019-05-23 NOTE — TELEPHONE ENCOUNTER
----- Message from Magy Padron sent at 5/23/2019 11:08 AM CDT -----  Contact: pt  Pt needs to schedule new pregnancy .. .[phone] (mwyb)

## 2019-05-27 ENCOUNTER — LAB VISIT (OUTPATIENT)
Dept: LAB | Facility: HOSPITAL | Age: 29
End: 2019-05-27
Attending: ADVANCED PRACTICE MIDWIFE
Payer: MEDICAID

## 2019-05-27 ENCOUNTER — TELEPHONE (OUTPATIENT)
Dept: OBSTETRICS AND GYNECOLOGY | Facility: CLINIC | Age: 29
End: 2019-05-27

## 2019-05-27 ENCOUNTER — INITIAL PRENATAL (OUTPATIENT)
Dept: OBSTETRICS AND GYNECOLOGY | Facility: CLINIC | Age: 29
End: 2019-05-27
Payer: MEDICAID

## 2019-05-27 VITALS — BODY MASS INDEX: 27.21 KG/M2 | SYSTOLIC BLOOD PRESSURE: 120 MMHG | DIASTOLIC BLOOD PRESSURE: 70 MMHG | WEIGHT: 187.5 LBS

## 2019-05-27 DIAGNOSIS — Z32.00 ENCOUNTER FOR CONFIRMATION OF PREGNANCY TEST RESULT WITH PHYSICAL EXAMINATION: Primary | ICD-10-CM

## 2019-05-27 DIAGNOSIS — O26.849 UTERINE SIZE-DATE DISCREPANCY, ANTEPARTUM: ICD-10-CM

## 2019-05-27 DIAGNOSIS — Z32.00 ENCOUNTER FOR CONFIRMATION OF PREGNANCY TEST RESULT WITH PHYSICAL EXAMINATION: ICD-10-CM

## 2019-05-27 DIAGNOSIS — F17.200 SMOKER: ICD-10-CM

## 2019-05-27 PROBLEM — D62 ACUTE BLOOD LOSS ANEMIA: Status: RESOLVED | Noted: 2017-04-27 | Resolved: 2019-05-27

## 2019-05-27 LAB
AMPHET+METHAMPHET UR QL: NEGATIVE
ANION GAP SERPL CALC-SCNC: 8 MMOL/L (ref 8–16)
BARBITURATES UR QL SCN>200 NG/ML: NEGATIVE
BASOPHILS # BLD AUTO: 0.04 K/UL (ref 0–0.2)
BASOPHILS NFR BLD: 0.6 % (ref 0–1.9)
BENZODIAZ UR QL SCN>200 NG/ML: NEGATIVE
BUN SERPL-MCNC: 9 MG/DL (ref 6–20)
BZE UR QL SCN: NEGATIVE
CALCIUM SERPL-MCNC: 9.5 MG/DL (ref 8.7–10.5)
CANNABINOIDS UR QL SCN: NEGATIVE
CHLORIDE SERPL-SCNC: 105 MMOL/L (ref 95–110)
CO2 SERPL-SCNC: 24 MMOL/L (ref 23–29)
CREAT SERPL-MCNC: 0.7 MG/DL (ref 0.5–1.4)
CREAT UR-MCNC: 100 MG/DL (ref 15–325)
DIFFERENTIAL METHOD: ABNORMAL
EOSINOPHIL # BLD AUTO: 0.1 K/UL (ref 0–0.5)
EOSINOPHIL NFR BLD: 1.1 % (ref 0–8)
ERYTHROCYTE [DISTWIDTH] IN BLOOD BY AUTOMATED COUNT: 13.5 % (ref 11.5–14.5)
EST. GFR  (AFRICAN AMERICAN): >60 ML/MIN/1.73 M^2
EST. GFR  (NON AFRICAN AMERICAN): >60 ML/MIN/1.73 M^2
ETHANOL UR-MCNC: <10 MG/DL
GLUCOSE SERPL-MCNC: 82 MG/DL (ref 70–110)
HCT VFR BLD AUTO: 38.7 % (ref 37–48.5)
HGB BLD-MCNC: 12.8 G/DL (ref 12–16)
IMM GRANULOCYTES # BLD AUTO: 0.02 K/UL (ref 0–0.04)
IMM GRANULOCYTES NFR BLD AUTO: 0.3 % (ref 0–0.5)
LYMPHOCYTES # BLD AUTO: 1.6 K/UL (ref 1–4.8)
LYMPHOCYTES NFR BLD: 25.6 % (ref 18–48)
MCH RBC QN AUTO: 31.7 PG (ref 27–31)
MCHC RBC AUTO-ENTMCNC: 33.1 G/DL (ref 32–36)
MCV RBC AUTO: 96 FL (ref 82–98)
METHADONE UR QL SCN>300 NG/ML: NEGATIVE
MONOCYTES # BLD AUTO: 0.4 K/UL (ref 0.3–1)
MONOCYTES NFR BLD: 5.7 % (ref 4–15)
NEUTROPHILS # BLD AUTO: 4.2 K/UL (ref 1.8–7.7)
NEUTROPHILS NFR BLD: 66.7 % (ref 38–73)
NRBC BLD-RTO: 0 /100 WBC
OPIATES UR QL SCN: NEGATIVE
PCP UR QL SCN>25 NG/ML: NEGATIVE
PLATELET # BLD AUTO: 203 K/UL (ref 150–350)
PMV BLD AUTO: 12 FL (ref 9.2–12.9)
POTASSIUM SERPL-SCNC: 4 MMOL/L (ref 3.5–5.1)
RBC # BLD AUTO: 4.04 M/UL (ref 4–5.4)
SODIUM SERPL-SCNC: 137 MMOL/L (ref 136–145)
TOXICOLOGY INFORMATION: NORMAL
WBC # BLD AUTO: 6.29 K/UL (ref 3.9–12.7)

## 2019-05-27 PROCEDURE — 85025 COMPLETE CBC W/AUTO DIFF WBC: CPT

## 2019-05-27 PROCEDURE — 36415 COLL VENOUS BLD VENIPUNCTURE: CPT | Mod: PO

## 2019-05-27 PROCEDURE — 86592 SYPHILIS TEST NON-TREP QUAL: CPT

## 2019-05-27 PROCEDURE — 80048 BASIC METABOLIC PNL TOTAL CA: CPT

## 2019-05-27 PROCEDURE — 99204 PR OFFICE/OUTPT VISIT, NEW, LEVL IV, 45-59 MIN: ICD-10-PCS | Mod: TH,S$PBB,, | Performed by: ADVANCED PRACTICE MIDWIFE

## 2019-05-27 PROCEDURE — 87086 URINE CULTURE/COLONY COUNT: CPT

## 2019-05-27 PROCEDURE — 87491 CHLMYD TRACH DNA AMP PROBE: CPT

## 2019-05-27 PROCEDURE — 86703 HIV-1/HIV-2 1 RESULT ANTBDY: CPT

## 2019-05-27 PROCEDURE — 86901 BLOOD TYPING SEROLOGIC RH(D): CPT

## 2019-05-27 PROCEDURE — 99204 OFFICE O/P NEW MOD 45 MIN: CPT | Mod: TH,S$PBB,, | Performed by: ADVANCED PRACTICE MIDWIFE

## 2019-05-27 PROCEDURE — 80307 DRUG TEST PRSMV CHEM ANLYZR: CPT

## 2019-05-27 PROCEDURE — 80074 ACUTE HEPATITIS PANEL: CPT

## 2019-05-27 PROCEDURE — 99999 PR PBB SHADOW E&M-EST. PATIENT-LVL II: ICD-10-PCS | Mod: PBBFAC,,, | Performed by: ADVANCED PRACTICE MIDWIFE

## 2019-05-27 PROCEDURE — 99999 PR PBB SHADOW E&M-EST. PATIENT-LVL II: CPT | Mod: PBBFAC,,, | Performed by: ADVANCED PRACTICE MIDWIFE

## 2019-05-27 PROCEDURE — 86762 RUBELLA ANTIBODY: CPT

## 2019-05-27 PROCEDURE — 99212 OFFICE O/P EST SF 10 MIN: CPT | Mod: PBBFAC,TH,PO | Performed by: ADVANCED PRACTICE MIDWIFE

## 2019-05-27 NOTE — PROGRESS NOTES
Pt here for new ob visit  Oriented to the practice including NAVEEN/MD collaboration  New OB labs today  Introduced to Coffective broderick  Blue bag materials reviewed  Warning signs discussed.  Genprobe collected.  Pt reports history of drug use but has been off of drugs for 3 years. States she drank alcohol before finding out she was pregnant then stopped. states she vapes instead of smoking cigarettes now. Educated pt on dangers of exposure to tobacco and nicotine. Pt states she will try to cut down.   Pt reports her family is urging her to have an IAB but she decided she wants to keep the baby.   Mild nausea and difficulty sleeping due to leg cramps. Discussed normal T1 concerns and relief measures.   Dating u/s scheduled for tomorrow.     RPAVEEN MONTANEZ/CONNOR Trejo CNM

## 2019-05-27 NOTE — TELEPHONE ENCOUNTER
----- Message from Alma Rosa Sloan sent at 5/27/2019 10:06 AM CDT -----  Contact: pt  Type:  Needs Medical Advice    Who Called: HARRY MTZ   Symptoms (please be specific):   How long has patient had these symptoms:   Pharmacy name and phone #:   Would the patient rather a call back or a response via [facility]? Call   Best Call Back Number:  [phone] (home)  Additional Information: pt is requesting a call back from the nurse in regards to the pt rescheduling her apt today please

## 2019-05-27 NOTE — TELEPHONE ENCOUNTER
Spoke to patient and r/s her appointment for 05/27/19 at 2:20pm to see NAVEEN Salinas at the [place] location. Patient verbalized understanding.

## 2019-05-28 ENCOUNTER — PROCEDURE VISIT (OUTPATIENT)
Dept: OBSTETRICS AND GYNECOLOGY | Facility: CLINIC | Age: 29
End: 2019-05-28
Payer: MEDICAID

## 2019-05-28 DIAGNOSIS — B18.2 CHRONIC HEPATITIS C WITHOUT HEPATIC COMA: ICD-10-CM

## 2019-05-28 DIAGNOSIS — O26.849 UTERINE SIZE-DATE DISCREPANCY, ANTEPARTUM: ICD-10-CM

## 2019-05-28 DIAGNOSIS — F19.11 H/O DRUG ABUSE: ICD-10-CM

## 2019-05-28 DIAGNOSIS — F11.11 HISTORY OF HEROIN ABUSE: ICD-10-CM

## 2019-05-28 LAB
ABO + RH BLD: NORMAL
BLD GP AB SCN CELLS X3 SERPL QL: NORMAL
C TRACH DNA SPEC QL NAA+PROBE: NOT DETECTED
HAV IGM SERPL QL IA: NEGATIVE
HBV CORE IGM SERPL QL IA: NEGATIVE
HBV SURFACE AG SERPL QL IA: NEGATIVE
HBV SURFACE AG SERPL QL IA: NEGATIVE
HCV AB SERPL QL IA: POSITIVE
HIV 1+2 AB+HIV1 P24 AG SERPL QL IA: NEGATIVE
N GONORRHOEA DNA SPEC QL NAA+PROBE: NOT DETECTED
RPR SER QL: NORMAL
RUBV IGG SER-ACNC: 5.8 IU/ML
RUBV IGG SER-IMP: ABNORMAL

## 2019-05-28 PROCEDURE — 76801 OB US < 14 WKS SINGLE FETUS: CPT | Mod: 26,S$PBB,, | Performed by: OBSTETRICS & GYNECOLOGY

## 2019-05-28 PROCEDURE — 76801 PR US, OB <14WKS, TRANSABD, SINGLE GESTATION: ICD-10-PCS | Mod: 26,S$PBB,, | Performed by: OBSTETRICS & GYNECOLOGY

## 2019-05-28 PROCEDURE — 76801 OB US < 14 WKS SINGLE FETUS: CPT | Mod: PBBFAC,PO | Performed by: OBSTETRICS & GYNECOLOGY

## 2019-05-29 LAB — BACTERIA UR CULT: NORMAL

## 2019-06-27 ENCOUNTER — ROUTINE PRENATAL (OUTPATIENT)
Dept: OBSTETRICS AND GYNECOLOGY | Facility: CLINIC | Age: 29
End: 2019-06-27
Payer: MEDICAID

## 2019-06-27 ENCOUNTER — LAB VISIT (OUTPATIENT)
Dept: LAB | Facility: HOSPITAL | Age: 29
End: 2019-06-27
Attending: ADVANCED PRACTICE MIDWIFE
Payer: MEDICAID

## 2019-06-27 VITALS — DIASTOLIC BLOOD PRESSURE: 68 MMHG | WEIGHT: 190.5 LBS | SYSTOLIC BLOOD PRESSURE: 110 MMHG | BODY MASS INDEX: 27.65 KG/M2

## 2019-06-27 DIAGNOSIS — R76.8 POSITIVE HEPATITIS C ANTIBODY TEST: ICD-10-CM

## 2019-06-27 DIAGNOSIS — R76.8 POSITIVE HEPATITIS C ANTIBODY TEST: Primary | ICD-10-CM

## 2019-06-27 PROCEDURE — 99212 OFFICE O/P EST SF 10 MIN: CPT | Mod: PBBFAC,PO | Performed by: ADVANCED PRACTICE MIDWIFE

## 2019-06-27 PROCEDURE — 99999 PR PBB SHADOW E&M-EST. PATIENT-LVL II: ICD-10-PCS | Mod: PBBFAC,,, | Performed by: ADVANCED PRACTICE MIDWIFE

## 2019-06-27 PROCEDURE — 87522 HEPATITIS C REVRS TRNSCRPJ: CPT

## 2019-06-27 PROCEDURE — 99213 PR OFFICE/OUTPT VISIT, EST, LEVL III, 20-29 MIN: ICD-10-PCS | Mod: TH,S$PBB,, | Performed by: ADVANCED PRACTICE MIDWIFE

## 2019-06-27 PROCEDURE — 36415 COLL VENOUS BLD VENIPUNCTURE: CPT | Mod: PO

## 2019-06-27 PROCEDURE — 99999 PR PBB SHADOW E&M-EST. PATIENT-LVL II: CPT | Mod: PBBFAC,,, | Performed by: ADVANCED PRACTICE MIDWIFE

## 2019-06-27 PROCEDURE — 99213 OFFICE O/P EST LOW 20 MIN: CPT | Mod: TH,S$PBB,, | Performed by: ADVANCED PRACTICE MIDWIFE

## 2019-06-27 NOTE — PROGRESS NOTES
Pt doing well, denies complaints or concerns.   Hep C RNA today.   Discussed birth plan, plans repeat CS with BTL. Aware consents for BTL will be signed at 28 weeks.

## 2019-07-01 LAB
HCV RNA SERPL NAA+PROBE-LOG IU: 5.36 LOG (10) IU/ML
HCV RNA SERPL QL NAA+PROBE: DETECTED IU/ML
HCV RNA SPEC NAA+PROBE-ACNC: ABNORMAL IU/ML

## 2019-07-04 ENCOUNTER — TELEPHONE (OUTPATIENT)
Dept: OBSTETRICS AND GYNECOLOGY | Facility: HOSPITAL | Age: 29
End: 2019-07-04

## 2019-07-05 ENCOUNTER — TELEPHONE (OUTPATIENT)
Dept: GASTROENTEROLOGY | Facility: CLINIC | Age: 29
End: 2019-07-05

## 2019-07-05 NOTE — TELEPHONE ENCOUNTER
----- Message from Elsi Page LPN sent at 7/5/2019  9:48 AM CDT -----  Pregnant patient with Hep C.  Could you please make her an appointment to see Dr. Tomlinson? Thanks so much!    Elsi  [phone]

## 2019-07-25 ENCOUNTER — LAB VISIT (OUTPATIENT)
Dept: LAB | Facility: HOSPITAL | Age: 29
End: 2019-07-25
Attending: ADVANCED PRACTICE MIDWIFE
Payer: MEDICAID

## 2019-07-25 ENCOUNTER — ROUTINE PRENATAL (OUTPATIENT)
Dept: OBSTETRICS AND GYNECOLOGY | Facility: CLINIC | Age: 29
End: 2019-07-25
Payer: MEDICAID

## 2019-07-25 VITALS — SYSTOLIC BLOOD PRESSURE: 120 MMHG | WEIGHT: 192.88 LBS | BODY MASS INDEX: 28 KG/M2 | DIASTOLIC BLOOD PRESSURE: 70 MMHG

## 2019-07-25 DIAGNOSIS — O09.92 SUPERVISION OF HIGH RISK PREGNANCY IN SECOND TRIMESTER: ICD-10-CM

## 2019-07-25 DIAGNOSIS — B18.2 CHRONIC HEPATITIS C WITHOUT HEPATIC COMA: Primary | ICD-10-CM

## 2019-07-25 DIAGNOSIS — Z98.891 HISTORY OF CESAREAN SECTION: ICD-10-CM

## 2019-07-25 DIAGNOSIS — O21.9 NAUSEA AND VOMITING IN PREGNANCY: ICD-10-CM

## 2019-07-25 DIAGNOSIS — B18.2 CHRONIC HEPATITIS C WITHOUT HEPATIC COMA: ICD-10-CM

## 2019-07-25 DIAGNOSIS — Z36.89 ENCOUNTER FOR FETAL ANATOMIC SURVEY: ICD-10-CM

## 2019-07-25 LAB
ALBUMIN SERPL BCP-MCNC: 3.7 G/DL (ref 3.5–5.2)
ALP SERPL-CCNC: 33 U/L (ref 55–135)
ALT SERPL W/O P-5'-P-CCNC: 38 U/L (ref 10–44)
AMPHET+METHAMPHET UR QL: NEGATIVE
ANION GAP SERPL CALC-SCNC: 9 MMOL/L (ref 8–16)
AST SERPL-CCNC: 31 U/L (ref 10–40)
BARBITURATES UR QL SCN>200 NG/ML: NEGATIVE
BASOPHILS # BLD AUTO: 0.02 K/UL (ref 0–0.2)
BASOPHILS NFR BLD: 0.4 % (ref 0–1.9)
BENZODIAZ UR QL SCN>200 NG/ML: NEGATIVE
BILIRUB SERPL-MCNC: 0.4 MG/DL (ref 0.1–1)
BILIRUB UR QL STRIP: NEGATIVE
BUN SERPL-MCNC: 9 MG/DL (ref 6–20)
BZE UR QL SCN: NEGATIVE
CALCIUM SERPL-MCNC: 9.2 MG/DL (ref 8.7–10.5)
CANNABINOIDS UR QL SCN: NORMAL
CHLORIDE SERPL-SCNC: 106 MMOL/L (ref 95–110)
CLARITY UR REFRACT.AUTO: CLEAR
CO2 SERPL-SCNC: 22 MMOL/L (ref 23–29)
COLOR UR AUTO: YELLOW
CREAT SERPL-MCNC: 0.7 MG/DL (ref 0.5–1.4)
CREAT UR-MCNC: 100 MG/DL (ref 15–325)
DIFFERENTIAL METHOD: ABNORMAL
EOSINOPHIL # BLD AUTO: 0 K/UL (ref 0–0.5)
EOSINOPHIL NFR BLD: 0.5 % (ref 0–8)
ERYTHROCYTE [DISTWIDTH] IN BLOOD BY AUTOMATED COUNT: 13 % (ref 11.5–14.5)
EST. GFR  (AFRICAN AMERICAN): >60 ML/MIN/1.73 M^2
EST. GFR  (NON AFRICAN AMERICAN): >60 ML/MIN/1.73 M^2
GLUCOSE SERPL-MCNC: 80 MG/DL (ref 70–110)
GLUCOSE UR QL STRIP: NEGATIVE
HCT VFR BLD AUTO: 37.4 % (ref 37–48.5)
HGB BLD-MCNC: 12.6 G/DL (ref 12–16)
HGB UR QL STRIP: NEGATIVE
IMM GRANULOCYTES # BLD AUTO: 0.02 K/UL (ref 0–0.04)
IMM GRANULOCYTES NFR BLD AUTO: 0.4 % (ref 0–0.5)
KETONES UR QL STRIP: NEGATIVE
LEUKOCYTE ESTERASE UR QL STRIP: NEGATIVE
LYMPHOCYTES # BLD AUTO: 1.3 K/UL (ref 1–4.8)
LYMPHOCYTES NFR BLD: 23.8 % (ref 18–48)
MCH RBC QN AUTO: 31.3 PG (ref 27–31)
MCHC RBC AUTO-ENTMCNC: 33.7 G/DL (ref 32–36)
MCV RBC AUTO: 93 FL (ref 82–98)
METHADONE UR QL SCN>300 NG/ML: NEGATIVE
MONOCYTES # BLD AUTO: 0.4 K/UL (ref 0.3–1)
MONOCYTES NFR BLD: 7 % (ref 4–15)
NEUTROPHILS # BLD AUTO: 3.7 K/UL (ref 1.8–7.7)
NEUTROPHILS NFR BLD: 67.9 % (ref 38–73)
NITRITE UR QL STRIP: NEGATIVE
NRBC BLD-RTO: 0 /100 WBC
OPIATES UR QL SCN: NEGATIVE
PCP UR QL SCN>25 NG/ML: NEGATIVE
PH UR STRIP: 7 [PH] (ref 5–8)
PLATELET # BLD AUTO: 180 K/UL (ref 150–350)
PMV BLD AUTO: 12.5 FL (ref 9.2–12.9)
POTASSIUM SERPL-SCNC: 4.1 MMOL/L (ref 3.5–5.1)
PROT SERPL-MCNC: 7.4 G/DL (ref 6–8.4)
PROT UR QL STRIP: NEGATIVE
RBC # BLD AUTO: 4.03 M/UL (ref 4–5.4)
SODIUM SERPL-SCNC: 137 MMOL/L (ref 136–145)
SP GR UR STRIP: 1.01 (ref 1–1.03)
TOXICOLOGY INFORMATION: NORMAL
URN SPEC COLLECT METH UR: NORMAL
WBC # BLD AUTO: 5.46 K/UL (ref 3.9–12.7)

## 2019-07-25 PROCEDURE — 36415 COLL VENOUS BLD VENIPUNCTURE: CPT | Mod: PO

## 2019-07-25 PROCEDURE — 81003 URINALYSIS AUTO W/O SCOPE: CPT | Mod: 59

## 2019-07-25 PROCEDURE — 99999 PR PBB SHADOW E&M-EST. PATIENT-LVL II: ICD-10-PCS | Mod: PBBFAC,,, | Performed by: ADVANCED PRACTICE MIDWIFE

## 2019-07-25 PROCEDURE — 85025 COMPLETE CBC W/AUTO DIFF WBC: CPT

## 2019-07-25 PROCEDURE — 87522 HEPATITIS C REVRS TRNSCRPJ: CPT

## 2019-07-25 PROCEDURE — 99213 OFFICE O/P EST LOW 20 MIN: CPT | Mod: TH,S$PBB,, | Performed by: ADVANCED PRACTICE MIDWIFE

## 2019-07-25 PROCEDURE — 99212 OFFICE O/P EST SF 10 MIN: CPT | Mod: PBBFAC,TH,PO | Performed by: ADVANCED PRACTICE MIDWIFE

## 2019-07-25 PROCEDURE — 80053 COMPREHEN METABOLIC PANEL: CPT

## 2019-07-25 PROCEDURE — 87086 URINE CULTURE/COLONY COUNT: CPT

## 2019-07-25 PROCEDURE — 99213 PR OFFICE/OUTPT VISIT, EST, LEVL III, 20-29 MIN: ICD-10-PCS | Mod: TH,S$PBB,, | Performed by: ADVANCED PRACTICE MIDWIFE

## 2019-07-25 PROCEDURE — 99999 PR PBB SHADOW E&M-EST. PATIENT-LVL II: CPT | Mod: PBBFAC,,, | Performed by: ADVANCED PRACTICE MIDWIFE

## 2019-07-25 PROCEDURE — 80307 DRUG TEST PRSMV CHEM ANLYZR: CPT

## 2019-07-25 RX ORDER — PROMETHAZINE HYDROCHLORIDE 12.5 MG/1
25 TABLET ORAL EVERY 6 HOURS PRN
Qty: 20 TABLET | Refills: 1 | Status: SHIPPED | OUTPATIENT
Start: 2019-07-25 | End: 2019-09-18 | Stop reason: SDUPTHER

## 2019-07-25 NOTE — PROGRESS NOTES
Patient has complaints of severe left sided abdominal pain that wraps around to her back. Denies fevers/chills. Reports some difficulty with urination. Negative for CVA tenderness. Abdomen non tender to palpation. Will send UA culture and do labs today. Denies vaginal bleeding or discharge.   Missed GI appointment yesterday because she was feeling so bad. Stressed the importance of seeing GI to manage Hepatitis this pregnancy.   Hep labs today.   Discussed US for nv.   Reviewed warning signs. Encouraged to increase hydration.

## 2019-07-26 LAB — BACTERIA UR CULT: NORMAL

## 2019-07-27 LAB
HCV RNA SERPL NAA+PROBE-LOG IU: 5.66 LOG (10) IU/ML
HCV RNA SERPL QL NAA+PROBE: DETECTED IU/ML
HCV RNA SPEC NAA+PROBE-ACNC: ABNORMAL IU/ML

## 2019-08-22 ENCOUNTER — PROCEDURE VISIT (OUTPATIENT)
Dept: OBSTETRICS AND GYNECOLOGY | Facility: CLINIC | Age: 29
End: 2019-08-22
Payer: MEDICAID

## 2019-08-22 ENCOUNTER — ROUTINE PRENATAL (OUTPATIENT)
Dept: OBSTETRICS AND GYNECOLOGY | Facility: CLINIC | Age: 29
End: 2019-08-22
Payer: MEDICAID

## 2019-08-22 VITALS
SYSTOLIC BLOOD PRESSURE: 126 MMHG | WEIGHT: 208.13 LBS | BODY MASS INDEX: 30.21 KG/M2 | DIASTOLIC BLOOD PRESSURE: 80 MMHG

## 2019-08-22 DIAGNOSIS — Z36.89 ENCOUNTER FOR FETAL ANATOMIC SURVEY: ICD-10-CM

## 2019-08-22 DIAGNOSIS — O09.299 HISTORY OF PRE-ECLAMPSIA IN PRIOR PREGNANCY, CURRENTLY PREGNANT: ICD-10-CM

## 2019-08-22 DIAGNOSIS — Z3A.20 20 WEEKS GESTATION OF PREGNANCY: ICD-10-CM

## 2019-08-22 DIAGNOSIS — Z98.891 HISTORY OF CESAREAN SECTION: Primary | ICD-10-CM

## 2019-08-22 DIAGNOSIS — O26.849 UTERINE SIZE DATE DISCREPANCY PREGNANCY: ICD-10-CM

## 2019-08-22 PROCEDURE — 76805 OB US >/= 14 WKS SNGL FETUS: CPT | Mod: PBBFAC | Performed by: OBSTETRICS & GYNECOLOGY

## 2019-08-22 PROCEDURE — 99212 OFFICE O/P EST SF 10 MIN: CPT | Mod: PBBFAC,TH | Performed by: MIDWIFE

## 2019-08-22 PROCEDURE — 99212 OFFICE O/P EST SF 10 MIN: CPT | Mod: TH,S$PBB,, | Performed by: MIDWIFE

## 2019-08-22 PROCEDURE — 76805 OB US >/= 14 WKS SNGL FETUS: CPT | Mod: 26,S$PBB,, | Performed by: OBSTETRICS & GYNECOLOGY

## 2019-08-22 PROCEDURE — 76805 PR US, OB 14+WKS, TRANSABD, SINGLE GESTATION: ICD-10-PCS | Mod: 26,S$PBB,, | Performed by: OBSTETRICS & GYNECOLOGY

## 2019-08-22 PROCEDURE — 99999 PR PBB SHADOW E&M-EST. PATIENT-LVL II: ICD-10-PCS | Mod: PBBFAC,,, | Performed by: MIDWIFE

## 2019-08-22 PROCEDURE — 99999 PR PBB SHADOW E&M-EST. PATIENT-LVL II: CPT | Mod: PBBFAC,,, | Performed by: MIDWIFE

## 2019-08-22 PROCEDURE — 99212 PR OFFICE/OUTPT VISIT, EST, LEVL II, 10-19 MIN: ICD-10-PCS | Mod: TH,S$PBB,, | Performed by: MIDWIFE

## 2019-08-22 NOTE — PROGRESS NOTES
29 y.o. female  at 20w1d   Reports Starting to feel flutters/FM, denies VB, LOF or cramping  Doing well without concerns   TW lbs   Reviewed anatomy US- EFW 14 oz, 3 VC, anterior placenta, supoptimal views, repeat u/s at next visit  Reviewed warning signs, normal FM,  labor precautions and how/when to call.  RTC x 4 wks, call or present sooner prn.

## 2019-09-18 ENCOUNTER — PATIENT MESSAGE (OUTPATIENT)
Dept: GASTROENTEROLOGY | Facility: CLINIC | Age: 29
End: 2019-09-18

## 2019-09-18 DIAGNOSIS — O21.9 NAUSEA AND VOMITING IN PREGNANCY: ICD-10-CM

## 2019-09-26 ENCOUNTER — PROCEDURE VISIT (OUTPATIENT)
Dept: OBSTETRICS AND GYNECOLOGY | Facility: CLINIC | Age: 29
End: 2019-09-26
Payer: MEDICAID

## 2019-09-26 ENCOUNTER — ROUTINE PRENATAL (OUTPATIENT)
Dept: OBSTETRICS AND GYNECOLOGY | Facility: CLINIC | Age: 29
End: 2019-09-26
Payer: MEDICAID

## 2019-09-26 VITALS
SYSTOLIC BLOOD PRESSURE: 116 MMHG | WEIGHT: 216.06 LBS | BODY MASS INDEX: 31.36 KG/M2 | DIASTOLIC BLOOD PRESSURE: 76 MMHG

## 2019-09-26 DIAGNOSIS — R12 HEARTBURN DURING PREGNANCY IN THIRD TRIMESTER: ICD-10-CM

## 2019-09-26 DIAGNOSIS — O26.893 HEARTBURN DURING PREGNANCY IN THIRD TRIMESTER: ICD-10-CM

## 2019-09-26 DIAGNOSIS — O21.9 NAUSEA AND VOMITING IN PREGNANCY: ICD-10-CM

## 2019-09-26 DIAGNOSIS — Z3A.25 25 WEEKS GESTATION OF PREGNANCY: ICD-10-CM

## 2019-09-26 DIAGNOSIS — Z23 FLU VACCINE NEED: ICD-10-CM

## 2019-09-26 DIAGNOSIS — O09.92 SUPERVISION OF HIGH RISK PREGNANCY IN SECOND TRIMESTER: Primary | ICD-10-CM

## 2019-09-26 DIAGNOSIS — O26.849 UTERINE SIZE DATE DISCREPANCY PREGNANCY: ICD-10-CM

## 2019-09-26 DIAGNOSIS — B18.2 CHRONIC HEPATITIS C WITHOUT HEPATIC COMA: ICD-10-CM

## 2019-09-26 PROCEDURE — 76816 OB US FOLLOW-UP PER FETUS: CPT | Mod: PBBFAC | Performed by: OBSTETRICS & GYNECOLOGY

## 2019-09-26 PROCEDURE — 99213 PR OFFICE/OUTPT VISIT, EST, LEVL III, 20-29 MIN: ICD-10-PCS | Mod: TH,S$PBB,, | Performed by: MIDWIFE

## 2019-09-26 PROCEDURE — 99999 PR PBB SHADOW E&M-EST. PATIENT-LVL II: ICD-10-PCS | Mod: PBBFAC,,, | Performed by: MIDWIFE

## 2019-09-26 PROCEDURE — 90471 IMMUNIZATION ADMIN: CPT | Mod: PBBFAC

## 2019-09-26 PROCEDURE — 99213 OFFICE O/P EST LOW 20 MIN: CPT | Mod: TH,S$PBB,, | Performed by: MIDWIFE

## 2019-09-26 PROCEDURE — 99999 PR PBB SHADOW E&M-EST. PATIENT-LVL II: CPT | Mod: PBBFAC,,, | Performed by: MIDWIFE

## 2019-09-26 PROCEDURE — 99212 OFFICE O/P EST SF 10 MIN: CPT | Mod: PBBFAC,TH,25 | Performed by: MIDWIFE

## 2019-09-26 PROCEDURE — 76816 OB US FOLLOW-UP PER FETUS: CPT | Mod: 26,S$PBB,, | Performed by: OBSTETRICS & GYNECOLOGY

## 2019-09-26 PROCEDURE — 76816 PR  US,PREGNANT UTERUS,F/U,TRANSABD APP: ICD-10-PCS | Mod: 26,S$PBB,, | Performed by: OBSTETRICS & GYNECOLOGY

## 2019-09-26 RX ORDER — PANTOPRAZOLE SODIUM 20 MG/1
20 TABLET, DELAYED RELEASE ORAL DAILY
Qty: 30 TABLET | Refills: 11 | Status: ON HOLD | OUTPATIENT
Start: 2019-09-26 | End: 2019-11-22 | Stop reason: SDUPTHER

## 2019-09-26 RX ORDER — PROMETHAZINE HYDROCHLORIDE 12.5 MG/1
25 TABLET ORAL EVERY 6 HOURS PRN
Qty: 20 TABLET | Refills: 1 | Status: ON HOLD | OUTPATIENT
Start: 2019-09-26 | End: 2019-11-22 | Stop reason: SDUPTHER

## 2019-09-26 RX ORDER — ONDANSETRON 8 MG/1
8 TABLET, ORALLY DISINTEGRATING ORAL EVERY 6 HOURS PRN
Qty: 25 TABLET | Refills: 1 | Status: ON HOLD | OUTPATIENT
Start: 2019-09-26 | End: 2020-01-04 | Stop reason: HOSPADM

## 2019-09-26 NOTE — PROGRESS NOTES
Verified patient with 2 patient identifiers. Allergies and medications reviewed.   Flu vaccine given IM to left deltoid using aseptic technique.   No discomfort noted. Patient tolerated well.     Patient advised to wait 15 minutes in lobby to monitor for reaction.   Patient verbalized understanding.

## 2019-09-26 NOTE — PROGRESS NOTES
29 y.o. female  at 25w1d   Reports + FM, denies VB, LOF or CTX  Doing well, nausea returning w/ increased heartburn, rx to pharmacy  Has appt next week w/ hepatology   TW lbs   28wk labs at [facility] (O POS)  Tdap info given  Flu vaccine today  US today for sub opt views, all visualized, bilateral EIF's noted, will follow up in T3  Reviewed warning signs, normal FKCs,  labor precautions and how/when to call.  RTC x 4 wks, call or present sooner prn.

## 2019-10-22 ENCOUNTER — LAB VISIT (OUTPATIENT)
Dept: LAB | Facility: HOSPITAL | Age: 29
End: 2019-10-22
Attending: ADVANCED PRACTICE MIDWIFE
Payer: MEDICAID

## 2019-10-22 ENCOUNTER — ROUTINE PRENATAL (OUTPATIENT)
Dept: OBSTETRICS AND GYNECOLOGY | Facility: CLINIC | Age: 29
End: 2019-10-22
Payer: MEDICAID

## 2019-10-22 VITALS — SYSTOLIC BLOOD PRESSURE: 118 MMHG | DIASTOLIC BLOOD PRESSURE: 74 MMHG | WEIGHT: 226 LBS | BODY MASS INDEX: 32.8 KG/M2

## 2019-10-22 DIAGNOSIS — Z98.891 HISTORY OF CESAREAN SECTION: Primary | ICD-10-CM

## 2019-10-22 DIAGNOSIS — O09.92 SUPERVISION OF HIGH RISK PREGNANCY IN SECOND TRIMESTER: ICD-10-CM

## 2019-10-22 DIAGNOSIS — O28.3 ABNORMAL FETAL ULTRASOUND: ICD-10-CM

## 2019-10-22 DIAGNOSIS — B18.2 CHRONIC HEPATITIS C WITHOUT HEPATIC COMA: ICD-10-CM

## 2019-10-22 DIAGNOSIS — F17.200 SMOKER: ICD-10-CM

## 2019-10-22 DIAGNOSIS — D50.8 IRON DEFICIENCY ANEMIA SECONDARY TO INADEQUATE DIETARY IRON INTAKE: ICD-10-CM

## 2019-10-22 LAB
ALBUMIN SERPL BCP-MCNC: 2.9 G/DL (ref 3.5–5.2)
ALP SERPL-CCNC: 59 U/L (ref 55–135)
ALT SERPL W/O P-5'-P-CCNC: 17 U/L (ref 10–44)
AST SERPL-CCNC: 17 U/L (ref 10–40)
BASOPHILS # BLD AUTO: 0.02 K/UL (ref 0–0.2)
BASOPHILS NFR BLD: 0.3 % (ref 0–1.9)
BILIRUB DIRECT SERPL-MCNC: 0.1 MG/DL (ref 0.1–0.3)
BILIRUB SERPL-MCNC: 0.2 MG/DL (ref 0.1–1)
DIFFERENTIAL METHOD: ABNORMAL
EOSINOPHIL # BLD AUTO: 0.1 K/UL (ref 0–0.5)
EOSINOPHIL NFR BLD: 0.9 % (ref 0–8)
ERYTHROCYTE [DISTWIDTH] IN BLOOD BY AUTOMATED COUNT: 13.1 % (ref 11.5–14.5)
GLUCOSE SERPL-MCNC: 113 MG/DL (ref 70–140)
HCT VFR BLD AUTO: 31.8 % (ref 37–48.5)
HGB BLD-MCNC: 10.4 G/DL (ref 12–16)
IMM GRANULOCYTES # BLD AUTO: 0.02 K/UL (ref 0–0.04)
IMM GRANULOCYTES NFR BLD AUTO: 0.3 % (ref 0–0.5)
LYMPHOCYTES # BLD AUTO: 1.6 K/UL (ref 1–4.8)
LYMPHOCYTES NFR BLD: 23 % (ref 18–48)
MCH RBC QN AUTO: 32 PG (ref 27–31)
MCHC RBC AUTO-ENTMCNC: 32.7 G/DL (ref 32–36)
MCV RBC AUTO: 98 FL (ref 82–98)
MONOCYTES # BLD AUTO: 0.4 K/UL (ref 0.3–1)
MONOCYTES NFR BLD: 5.5 % (ref 4–15)
NEUTROPHILS # BLD AUTO: 4.7 K/UL (ref 1.8–7.7)
NEUTROPHILS NFR BLD: 70 % (ref 38–73)
NRBC BLD-RTO: 0 /100 WBC
PLATELET # BLD AUTO: 195 K/UL (ref 150–350)
PMV BLD AUTO: 11.8 FL (ref 9.2–12.9)
PROT SERPL-MCNC: 6.6 G/DL (ref 6–8.4)
RBC # BLD AUTO: 3.25 M/UL (ref 4–5.4)
WBC # BLD AUTO: 6.74 K/UL (ref 3.9–12.7)

## 2019-10-22 PROCEDURE — 90471 IMMUNIZATION ADMIN: CPT | Mod: PBBFAC,PO

## 2019-10-22 PROCEDURE — 99213 PR OFFICE/OUTPT VISIT, EST, LEVL III, 20-29 MIN: ICD-10-PCS | Mod: TH,S$PBB,, | Performed by: ADVANCED PRACTICE MIDWIFE

## 2019-10-22 PROCEDURE — 87522 HEPATITIS C REVRS TRNSCRPJ: CPT

## 2019-10-22 PROCEDURE — 99999 PR PBB SHADOW E&M-EST. PATIENT-LVL III: CPT | Mod: PBBFAC,,, | Performed by: ADVANCED PRACTICE MIDWIFE

## 2019-10-22 PROCEDURE — 99213 OFFICE O/P EST LOW 20 MIN: CPT | Mod: TH,S$PBB,, | Performed by: ADVANCED PRACTICE MIDWIFE

## 2019-10-22 PROCEDURE — 85025 COMPLETE CBC W/AUTO DIFF WBC: CPT

## 2019-10-22 PROCEDURE — 99999 PR PBB SHADOW E&M-EST. PATIENT-LVL III: ICD-10-PCS | Mod: PBBFAC,,, | Performed by: ADVANCED PRACTICE MIDWIFE

## 2019-10-22 PROCEDURE — 86703 HIV-1/HIV-2 1 RESULT ANTBDY: CPT

## 2019-10-22 PROCEDURE — 82950 GLUCOSE TEST: CPT

## 2019-10-22 PROCEDURE — 99213 OFFICE O/P EST LOW 20 MIN: CPT | Mod: PBBFAC,TH,PO | Performed by: ADVANCED PRACTICE MIDWIFE

## 2019-10-22 PROCEDURE — 80076 HEPATIC FUNCTION PANEL: CPT

## 2019-10-22 PROCEDURE — 36415 COLL VENOUS BLD VENIPUNCTURE: CPT | Mod: PO

## 2019-10-22 PROCEDURE — 86592 SYPHILIS TEST NON-TREP QUAL: CPT

## 2019-10-22 NOTE — PROGRESS NOTES
"29 y.o. female  at 28w6d   Reports + FM, denies VB, LOF or CTX  Doing well without concerns, feels like "i'm blowing up" c/o legs feel tight, pressure in abd and sharp pulling pain in upper abd when pt stretches her arms above her head  Exp comfort measures, watch salt in diet, elevate feet  Missed hepatology appt, "I guess I wasn't able to get out there"  MD appt next visit to sched c/s, pt has no preference of MD, desires BTL r/b/a permanency discussed, consent signed  TW lbs   28wk labs today (O POS)  Tdap today, did flu last OV  Reviewed warning signs, normal FKCs,  labor precautions and how/when to call.  RTC x 2 wks, call or present sooner prn.    US next OV for growth S>D and f/u EIF per Dr DYLON Valera's note on previous US    Planning to breastfeed but has always had supply issues, exp freq nursing/stimulation to help with supply, + breast changes noted during pregnancy. al       "

## 2019-10-23 LAB
HIV 1+2 AB+HIV1 P24 AG SERPL QL IA: NEGATIVE
RPR SER QL: NORMAL

## 2019-10-24 LAB
HCV RNA SERPL NAA+PROBE-LOG IU: 5.58 LOG (10) IU/ML
HCV RNA SERPL QL NAA+PROBE: DETECTED IU/ML
HCV RNA SPEC NAA+PROBE-ACNC: ABNORMAL IU/ML

## 2019-10-25 ENCOUNTER — TELEPHONE (OUTPATIENT)
Dept: GASTROENTEROLOGY | Facility: CLINIC | Age: 29
End: 2019-10-25

## 2019-10-25 NOTE — TELEPHONE ENCOUNTER
----- Message from Bridget Hu LPN sent at 10/25/2019  3:53 PM CDT -----  Pregnant medicaid patient with hep c. She missed her last 2 scheduled appointments because of not being able to make it over the [facility]. Can you get her scheduled as soon as possible please?

## 2019-10-25 NOTE — PROGRESS NOTES
I saw this pt for the first time this week. + Hep C missed her hepatology appointment. She is a scheduled repeat cs f/u with you on 11/13. Should I be doing anything in particular for her since she has not gone to the hepatologist? Kat[place]ka

## 2019-10-31 ENCOUNTER — PATIENT MESSAGE (OUTPATIENT)
Dept: GASTROENTEROLOGY | Facility: CLINIC | Age: 29
End: 2019-10-31

## 2019-11-04 ENCOUNTER — TELEPHONE (OUTPATIENT)
Dept: GASTROENTEROLOGY | Facility: CLINIC | Age: 29
End: 2019-11-04

## 2019-11-04 NOTE — TELEPHONE ENCOUNTER
----- Message from Lolly Chu LPN sent at 10/25/2019  4:03 PM CDT -----  appt for Hep c. Medicaid pt.

## 2019-11-13 ENCOUNTER — PROCEDURE VISIT (OUTPATIENT)
Dept: OBSTETRICS AND GYNECOLOGY | Facility: CLINIC | Age: 29
End: 2019-11-13
Payer: MEDICAID

## 2019-11-13 ENCOUNTER — ROUTINE PRENATAL (OUTPATIENT)
Dept: OBSTETRICS AND GYNECOLOGY | Facility: CLINIC | Age: 29
End: 2019-11-13
Payer: MEDICAID

## 2019-11-13 VITALS — SYSTOLIC BLOOD PRESSURE: 120 MMHG | WEIGHT: 233 LBS | DIASTOLIC BLOOD PRESSURE: 82 MMHG | BODY MASS INDEX: 33.82 KG/M2

## 2019-11-13 DIAGNOSIS — B18.2 CHRONIC HEPATITIS C WITHOUT HEPATIC COMA: ICD-10-CM

## 2019-11-13 DIAGNOSIS — Z98.891 HISTORY OF CESAREAN SECTION: ICD-10-CM

## 2019-11-13 DIAGNOSIS — O28.3 ABNORMAL FETAL ULTRASOUND: ICD-10-CM

## 2019-11-13 DIAGNOSIS — F17.200 SMOKER: ICD-10-CM

## 2019-11-13 DIAGNOSIS — O09.93 SUPERVISION OF HIGH RISK PREGNANCY IN THIRD TRIMESTER: Primary | ICD-10-CM

## 2019-11-13 PROCEDURE — 76819 FETAL BIOPHYS PROFIL W/O NST: CPT | Mod: PBBFAC | Performed by: OBSTETRICS & GYNECOLOGY

## 2019-11-13 PROCEDURE — 99212 OFFICE O/P EST SF 10 MIN: CPT | Mod: PBBFAC,TH,25 | Performed by: OBSTETRICS & GYNECOLOGY

## 2019-11-13 PROCEDURE — 99212 OFFICE O/P EST SF 10 MIN: CPT | Mod: TH,S$PBB,, | Performed by: OBSTETRICS & GYNECOLOGY

## 2019-11-13 PROCEDURE — 99999 PR PBB SHADOW E&M-EST. PATIENT-LVL II: ICD-10-PCS | Mod: PBBFAC,,, | Performed by: OBSTETRICS & GYNECOLOGY

## 2019-11-13 PROCEDURE — 99999 PR PBB SHADOW E&M-EST. PATIENT-LVL II: CPT | Mod: PBBFAC,,, | Performed by: OBSTETRICS & GYNECOLOGY

## 2019-11-13 PROCEDURE — 76815 OB US LIMITED FETUS(S): CPT | Mod: 26,S$PBB,, | Performed by: OBSTETRICS & GYNECOLOGY

## 2019-11-13 PROCEDURE — 76819 FETAL BIOPHYS PROFIL W/O NST: CPT | Mod: 26,S$PBB,, | Performed by: OBSTETRICS & GYNECOLOGY

## 2019-11-13 PROCEDURE — 76815 PR  US,PREGNANT UTERUS,LIMITED, 1/> FETUSES: ICD-10-PCS | Mod: 26,S$PBB,, | Performed by: OBSTETRICS & GYNECOLOGY

## 2019-11-13 PROCEDURE — 76819 PR US, OB, FETAL BIOPHYSICAL, W/O NST: ICD-10-PCS | Mod: 26,S$PBB,, | Performed by: OBSTETRICS & GYNECOLOGY

## 2019-11-13 PROCEDURE — 76815 OB US LIMITED FETUS(S): CPT | Mod: PBBFAC | Performed by: OBSTETRICS & GYNECOLOGY

## 2019-11-13 PROCEDURE — 99212 PR OFFICE/OUTPT VISIT, EST, LEVL II, 10-19 MIN: ICD-10-PCS | Mod: TH,S$PBB,, | Performed by: OBSTETRICS & GYNECOLOGY

## 2019-11-13 NOTE — PROGRESS NOTES
US today: 2323 g (53%), cephalic, BPP 8/8, LEONIE 14, MVP 6, anterior placenta, EIF no longer present  Pt reports complaints of generalized discomfort.  Otherwise doing well.  Prior C/S x 3 desires repeat C/S with BTL.  Scheduled for 01/02/20 @ 0700  Labor precautions and kick counts.

## 2019-11-14 ENCOUNTER — PATIENT MESSAGE (OUTPATIENT)
Dept: OBSTETRICS AND GYNECOLOGY | Facility: CLINIC | Age: 29
End: 2019-11-14

## 2019-11-19 ENCOUNTER — TELEPHONE (OUTPATIENT)
Dept: OBSTETRICS AND GYNECOLOGY | Facility: CLINIC | Age: 29
End: 2019-11-19

## 2019-11-19 DIAGNOSIS — Z98.891 HISTORY OF CESAREAN SECTION: Primary | ICD-10-CM

## 2019-11-22 ENCOUNTER — NURSE TRIAGE (OUTPATIENT)
Dept: ADMINISTRATIVE | Facility: CLINIC | Age: 29
End: 2019-11-22

## 2019-11-22 ENCOUNTER — HOSPITAL ENCOUNTER (OUTPATIENT)
Facility: HOSPITAL | Age: 29
LOS: 1 days | Discharge: HOME | End: 2019-11-22
Attending: OBSTETRICS & GYNECOLOGY | Admitting: OBSTETRICS & GYNECOLOGY
Payer: MEDICAID

## 2019-11-22 VITALS
HEIGHT: 66 IN | DIASTOLIC BLOOD PRESSURE: 59 MMHG | RESPIRATION RATE: 16 BRPM | SYSTOLIC BLOOD PRESSURE: 110 MMHG | HEART RATE: 91 BPM | TEMPERATURE: 99 F | WEIGHT: 230 LBS | OXYGEN SATURATION: 100 % | BODY MASS INDEX: 36.96 KG/M2

## 2019-11-22 DIAGNOSIS — R11.2 NAUSEA AND VOMITING: ICD-10-CM

## 2019-11-22 DIAGNOSIS — R12 HEARTBURN DURING PREGNANCY IN THIRD TRIMESTER: ICD-10-CM

## 2019-11-22 DIAGNOSIS — O26.893 HEARTBURN DURING PREGNANCY IN THIRD TRIMESTER: ICD-10-CM

## 2019-11-22 DIAGNOSIS — O21.9 NAUSEA AND VOMITING IN PREGNANCY: ICD-10-CM

## 2019-11-22 LAB
ALBUMIN SERPL BCP-MCNC: 3.1 G/DL (ref 3.5–5.2)
ALP SERPL-CCNC: 88 U/L (ref 55–135)
ALT SERPL W/O P-5'-P-CCNC: 20 U/L (ref 10–44)
ANION GAP SERPL CALC-SCNC: 13 MMOL/L (ref 8–16)
AST SERPL-CCNC: 23 U/L (ref 10–40)
BASOPHILS # BLD AUTO: 0.01 K/UL (ref 0–0.2)
BASOPHILS NFR BLD: 0.1 % (ref 0–1.9)
BILIRUB SERPL-MCNC: 0.6 MG/DL (ref 0.1–1)
BUN SERPL-MCNC: 10 MG/DL (ref 6–20)
CALCIUM SERPL-MCNC: 8.4 MG/DL (ref 8.7–10.5)
CHLORIDE SERPL-SCNC: 109 MMOL/L (ref 95–110)
CO2 SERPL-SCNC: 15 MMOL/L (ref 23–29)
CREAT SERPL-MCNC: 0.6 MG/DL (ref 0.5–1.4)
DIFFERENTIAL METHOD: ABNORMAL
EOSINOPHIL # BLD AUTO: 0 K/UL (ref 0–0.5)
EOSINOPHIL NFR BLD: 0 % (ref 0–8)
ERYTHROCYTE [DISTWIDTH] IN BLOOD BY AUTOMATED COUNT: 12.7 % (ref 11.5–14.5)
EST. GFR  (AFRICAN AMERICAN): >60 ML/MIN/1.73 M^2
EST. GFR  (NON AFRICAN AMERICAN): >60 ML/MIN/1.73 M^2
GLUCOSE SERPL-MCNC: 109 MG/DL (ref 70–110)
HCT VFR BLD AUTO: 34.8 % (ref 37–48.5)
HGB BLD-MCNC: 11.5 G/DL (ref 12–16)
IMM GRANULOCYTES # BLD AUTO: 0.03 K/UL (ref 0–0.04)
IMM GRANULOCYTES NFR BLD AUTO: 0.4 % (ref 0–0.5)
LYMPHOCYTES # BLD AUTO: 0.5 K/UL (ref 1–4.8)
LYMPHOCYTES NFR BLD: 6.1 % (ref 18–48)
MCH RBC QN AUTO: 30.9 PG (ref 27–31)
MCHC RBC AUTO-ENTMCNC: 33 G/DL (ref 32–36)
MCV RBC AUTO: 94 FL (ref 82–98)
MONOCYTES # BLD AUTO: 0.2 K/UL (ref 0.3–1)
MONOCYTES NFR BLD: 2 % (ref 4–15)
NEUTROPHILS # BLD AUTO: 7.2 K/UL (ref 1.8–7.7)
NEUTROPHILS NFR BLD: 91.4 % (ref 38–73)
NRBC BLD-RTO: 0 /100 WBC
PLATELET # BLD AUTO: 217 K/UL (ref 150–350)
PMV BLD AUTO: 11.3 FL (ref 9.2–12.9)
POTASSIUM SERPL-SCNC: 3.8 MMOL/L (ref 3.5–5.1)
PROT SERPL-MCNC: 7.2 G/DL (ref 6–8.4)
RBC # BLD AUTO: 3.72 M/UL (ref 4–5.4)
SODIUM SERPL-SCNC: 137 MMOL/L (ref 136–145)
WBC # BLD AUTO: 7.89 K/UL (ref 3.9–12.7)

## 2019-11-22 PROCEDURE — 36415 COLL VENOUS BLD VENIPUNCTURE: CPT

## 2019-11-22 PROCEDURE — 96360 HYDRATION IV INFUSION INIT: CPT

## 2019-11-22 PROCEDURE — 59025 FETAL NON-STRESS TEST: CPT

## 2019-11-22 PROCEDURE — 99213 OFFICE O/P EST LOW 20 MIN: CPT | Mod: TH,,, | Performed by: OBSTETRICS & GYNECOLOGY

## 2019-11-22 PROCEDURE — 99211 OFF/OP EST MAY X REQ PHY/QHP: CPT | Mod: 25

## 2019-11-22 PROCEDURE — 25000003 PHARM REV CODE 250: Performed by: ADVANCED PRACTICE MIDWIFE

## 2019-11-22 PROCEDURE — 85025 COMPLETE CBC W/AUTO DIFF WBC: CPT

## 2019-11-22 PROCEDURE — 96361 HYDRATE IV INFUSION ADD-ON: CPT

## 2019-11-22 PROCEDURE — 99213 PR OFFICE/OUTPT VISIT, EST, LEVL III, 20-29 MIN: ICD-10-PCS | Mod: TH,,, | Performed by: OBSTETRICS & GYNECOLOGY

## 2019-11-22 PROCEDURE — 80053 COMPREHEN METABOLIC PANEL: CPT

## 2019-11-22 PROCEDURE — 63600175 PHARM REV CODE 636 W HCPCS: Performed by: ADVANCED PRACTICE MIDWIFE

## 2019-11-22 RX ORDER — ONDANSETRON 8 MG/1
8 TABLET, ORALLY DISINTEGRATING ORAL EVERY 6 HOURS PRN
Status: DISCONTINUED | OUTPATIENT
Start: 2019-11-22 | End: 2019-11-22 | Stop reason: HOSPADM

## 2019-11-22 RX ORDER — ONDANSETRON 8 MG/1
8 TABLET, ORALLY DISINTEGRATING ORAL EVERY 8 HOURS PRN
Status: DISCONTINUED | OUTPATIENT
Start: 2019-11-22 | End: 2019-11-22 | Stop reason: HOSPADM

## 2019-11-22 RX ORDER — PROMETHAZINE HYDROCHLORIDE 12.5 MG/1
25 TABLET ORAL EVERY 6 HOURS PRN
Qty: 20 TABLET | Refills: 0 | Status: ON HOLD | OUTPATIENT
Start: 2019-11-22 | End: 2020-01-04 | Stop reason: HOSPADM

## 2019-11-22 RX ORDER — PANTOPRAZOLE SODIUM 20 MG/1
20 TABLET, DELAYED RELEASE ORAL DAILY
Qty: 30 TABLET | Refills: 2 | Status: ON HOLD | OUTPATIENT
Start: 2019-11-22 | End: 2020-01-04 | Stop reason: HOSPADM

## 2019-11-22 RX ORDER — ACETAMINOPHEN 500 MG
500 TABLET ORAL EVERY 6 HOURS PRN
Status: DISCONTINUED | OUTPATIENT
Start: 2019-11-22 | End: 2019-11-22 | Stop reason: HOSPADM

## 2019-11-22 RX ADMIN — SODIUM CHLORIDE, SODIUM LACTATE, POTASSIUM CHLORIDE, AND CALCIUM CHLORIDE 1000 ML: .6; .31; .03; .02 INJECTION, SOLUTION INTRAVENOUS at 06:11

## 2019-11-22 RX ADMIN — SODIUM CHLORIDE, SODIUM LACTATE, POTASSIUM CHLORIDE, AND CALCIUM CHLORIDE 1000 ML: .6; .31; .03; .02 INJECTION, SOLUTION INTRAVENOUS at 05:11

## 2019-11-22 RX ADMIN — ONDANSETRON 8 MG: 8 TABLET, ORALLY DISINTEGRATING ORAL at 05:11

## 2019-11-22 NOTE — TELEPHONE ENCOUNTER
"Chest pain and abdominal pain. Cold and clammy    Reason for Disposition   [1] SEVERE vomiting (e.g., 6 or more times/day) AND [2] present > 8 hours    Additional Information   Negative: Shock suspected (e.g., cold/pale/clammy skin, too weak to stand, low BP, rapid pulse)   Negative: Difficult to awaken or acting confused (e.g., disoriented, slurred speech)   Negative: Sounds like a life-threatening emergency to the triager   Negative: Vomiting occurs only while coughing   Negative: [1] Pregnant < 20 Weeks AND [2] nausea/vomiting began in early pregnancy (i.e., 4-8 weeks pregnant)   Negative: Chest pain   Negative: Headache is main symptom   Negative: Vomiting (or Nausea) in a cancer patient who is currently (or recently) receiving chemotherapy or radiation therapy, or cancer patient who has metastatic or end-stage cancer and is receiving palliative care   Negative: [1] Vomiting AND [2] contains red blood or black ("coffee ground") material  (Exception: few red streaks in vomit that only happened once)   Negative: Severe pain in one eye   Negative: Recent head injury (within last 3 days)   Negative: Recent abdominal injury (within last 3 days)   Negative: [1] Insulin-dependent diabetes (Type I) AND [2] glucose > 400 mg/dl (22 mmol/l)    Protocols used: VOMITING-A-AH      "

## 2019-11-22 NOTE — SUBJECTIVE & OBJECTIVE
Obstetric HPI:  Patient reports None contractions, active fetal movement, No vaginal bleeding , No loss of fluid   Began vomiting at 1700 yesterday  This pregnancy has been complicated by heroin abuse,Hepatitis A,Hepatitis C HX c section, HX PIH , smoker    OB History    Para Term  AB Living   5 3 3 0 1 3   SAB TAB Ectopic Multiple Live Births   0 1 0 0 3      # Outcome Date GA Lbr Brandon/2nd Weight Sex Delivery Anes PTL Lv   5 Current            4 Term 17 38w2d  2.81 kg (6 lb 3.1 oz) M CS-LTranv Spinal N MARCIANO      Name: KAYODE MTZ      Apgar1: 9  Apgar5: 9   3 Term 14 41w2d  4.321 kg (9 lb 8.4 oz) F CS-LTranv Spinal N MARCIANO      Apgar1: 9  Apgar5: 9   2 Term 13 39w5d  3.544 kg (7 lb 13 oz) M CS-Unspec   MARCIANO   1 TAB              Past Medical History:   Diagnosis Date    ADD (attention deficit disorder)     Anxiety     Depression     Hepatitis     Hep A,B,C    Hypertension     Migraine headache     Panic attack     Postpartum depression associated with first pregnancy     pt verbalized she was hospitalized     Past Surgical History:   Procedure Laterality Date    APPENDECTOMY       SECTION      x1       PTA Medications   Medication Sig    multivitamin capsule Take 1 capsule by mouth once daily.    ondansetron (ZOFRAN-ODT) 8 MG TbDL Take 1 tablet (8 mg total) by mouth every 6 (six) hours as needed. (Patient not taking: Reported on 2019)    pantoprazole (PROTONIX) 20 MG tablet Take 1 tablet (20 mg total) by mouth once daily.    promethazine (PHENERGAN) 12.5 MG Tab Take 2 tablets (25 mg total) by mouth every 6 (six) hours as needed. (Patient not taking: Reported on 2019)       Review of patient's allergies indicates:  No Known Allergies     Family History     Problem Relation (Age of Onset)    Hypertension Mother        Tobacco Use    Smoking status: Current Some Day Smoker     Packs/day: 0.25     Types: Cigarettes    Smokeless tobacco: Never Used    Substance and Sexual Activity    Alcohol use: Yes     Comment: OCC    Drug use: No     Comment: previous drug user (heroin, meth)    Sexual activity: Yes     Partners: Male     Birth control/protection: None     Review of Systems   Constitutional: Negative.    HENT: Negative.    Eyes: Negative.    Respiratory: Negative.    Cardiovascular: Negative.    Gastrointestinal: Positive for abdominal pain, nausea and vomiting.   Endocrine: Negative.    Genitourinary: Negative.    Musculoskeletal: Negative.    Integumentary:  Negative.   Neurological: Negative.    Hematological: Negative.    Psychiatric/Behavioral: Negative.    All other systems reviewed and are negative.  Breast: negative.       Objective:     Vital Signs (Most Recent):  Pulse: 93 (11/22/19 0513)  BP: 139/78 (11/22/19 0513) Vital Signs (24h Range):  Pulse:  [93] 93  BP: (139)/(78) 139/78        There is no height or weight on file to calculate BMI.    FHT: 150Cat 1 (reassuring)  TOCO:  Q 3-4 minutes    Physical Exam:   Constitutional: She is oriented to person, place, and time. She appears well-developed and well-nourished.    HENT:   Head: Normocephalic.    Eyes: Pupils are equal, round, and reactive to light.    Neck: Normal range of motion.    Cardiovascular: Normal rate and regular rhythm.     Pulmonary/Chest: Effort normal and breath sounds normal.        Abdominal: Soft.             Musculoskeletal: Normal range of motion.       Neurological: She is alert and oriented to person, place, and time.    Skin: Skin is warm and dry.    Psychiatric: She has a normal mood and affect. Her behavior is normal.            Significant Labs:  Lab Results   Component Value Date    GROUPTRH O POS 05/27/2019    HEPBSAG Negative 05/27/2019    HEPBSAG Negative 05/27/2019    STREPBCULT No Group B Streptococcus isolated 04/11/2017       I have personallly reviewed all pertinent lab results from the last 24 hours.

## 2019-11-22 NOTE — NURSING
Pt presents to LD with complaints of N/V/D since 0600 yesterday with unable to tolerate any intake, pt placed in triage and monitors applied, CNM in department, notified of pt complaints and arrival

## 2019-11-22 NOTE — H&P
[facility] -   Obstetrics  History & Physical    Patient Name: Radha Vanegas  MRN: [id number]  Admission Date: 2019  Primary Care Provider: Anika Johnston MD    Subjective:     Principal Problem:<principal problem not specified>    History of Present Illness:   IUP at 33w2d presents with nausea and vomiting since 1700    Obstetric HPI:  Patient reports None contractions, active fetal movement, No vaginal bleeding , No loss of fluid   Began vomiting at 1700 yesterday  This pregnancy has been complicated by heroin abuse,Hepatitis A,Hepatitis C HX c section, HX PIH , smoker    OB History    Para Term  AB Living   5 3 3 0 1 3   SAB TAB Ectopic Multiple Live Births   0 1 0 0 3      # Outcome Date GA Lbr Brandon/2nd Weight Sex Delivery Anes PTL Lv   5 Current            4 Term 17 38w2d  2.81 kg (6 lb 3.1 oz) M CS-LTranv Spinal N MARCIANO      Name: KAYODE VANEGAS      Apgar1: 9  Apgar5: 9   3 Term 14 41w2d  4.321 kg (9 lb 8.4 oz) F CS-LTranv Spinal N MARCIANO      Apgar1: 9  Apgar5: 9   2 Term 13 39w5d  3.544 kg (7 lb 13 oz) M CS-Unspec   MARCIANO   1 TAB              Past Medical History:   Diagnosis Date    ADD (attention deficit disorder)     Anxiety     Depression     Hepatitis     Hep A,B,C    Hypertension     Migraine headache     Panic attack     Postpartum depression associated with first pregnancy     pt verbalized she was hospitalized     Past Surgical History:   Procedure Laterality Date    APPENDECTOMY       SECTION      x1       PTA Medications   Medication Sig    multivitamin capsule Take 1 capsule by mouth once daily.    ondansetron (ZOFRAN-ODT) 8 MG TbDL Take 1 tablet (8 mg total) by mouth every 6 (six) hours as needed. (Patient not taking: Reported on 2019)    pantoprazole (PROTONIX) 20 MG tablet Take 1 tablet (20 mg total) by mouth once daily.    promethazine (PHENERGAN) 12.5 MG Tab Take 2 tablets (25 mg total) by mouth every 6 (six)  hours as needed. (Patient not taking: Reported on 11/13/2019)       Review of patient's allergies indicates:  No Known Allergies     Family History     Problem Relation (Age of Onset)    Hypertension Mother        Tobacco Use    Smoking status: Current Some Day Smoker     Packs/day: 0.25     Types: Cigarettes    Smokeless tobacco: Never Used   Substance and Sexual Activity    Alcohol use: Yes     Comment: OCC    Drug use: No     Comment: previous drug user (heroin, meth)    Sexual activity: Yes     Partners: Male     Birth control/protection: None     Review of Systems   Constitutional: Negative.    HENT: Negative.    Eyes: Negative.    Respiratory: Negative.    Cardiovascular: Negative.    Gastrointestinal: Positive for abdominal pain, nausea and vomiting.   Endocrine: Negative.    Genitourinary: Negative.    Musculoskeletal: Negative.    Integumentary:  Negative.   Neurological: Negative.    Hematological: Negative.    Psychiatric/Behavioral: Negative.    All other systems reviewed and are negative.  Breast: negative.       Objective:     Vital Signs (Most Recent):  Pulse: 93 (11/22/19 0513)  BP: 139/78 (11/22/19 0513) Vital Signs (24h Range):  Pulse:  [93] 93  BP: (139)/(78) 139/78        There is no height or weight on file to calculate BMI.    FHT: 150Cat 1 (reassuring)  TOCO:  Q 3-4 minutes    Physical Exam:   Constitutional: She is oriented to person, place, and time. She appears well-developed and well-nourished.    HENT:   Head: Normocephalic.    Eyes: Pupils are equal, round, and reactive to light.    Neck: Normal range of motion.    Cardiovascular: Normal rate and regular rhythm.     Pulmonary/Chest: Effort normal and breath sounds normal.        Abdominal: Soft.             Musculoskeletal: Normal range of motion.       Neurological: She is alert and oriented to person, place, and time.    Skin: Skin is warm and dry.    Psychiatric: She has a normal mood and affect. Her behavior is normal.             Significant Labs:  Lab Results   Component Value Date    GROUPTRH O POS 2019    HEPBSAG Negative 2019    HEPBSAG Negative 2019    STREPBCULT No Group B Streptococcus isolated 2017       I have personallly reviewed all pertinent lab results from the last 24 hours.    Assessment/Plan:     29 y.o. female  at 33w2d for:    Nausea and vomiting  IV fluids  zofran  CBC, CMP        Jeana Monteiro, NAVEEN  Obstetrics  [facility]

## 2019-11-22 NOTE — HOSPITAL COURSE
Admit for IV fluids and zofran  11/21/19 Completed second bag of fluids, nausea improved and patient requesting to go home. Will release and send home with refills on Protonix and phenergan, take as directed. Has follow up appts scheduled.

## 2019-11-22 NOTE — ASSESSMENT & PLAN NOTE
Completed fluid hydration, CBC, CMP normal.   Released home with refill on phenergan and protonix. Take as directed.

## 2019-11-22 NOTE — DISCHARGE SUMMARY
[facility] -   Obstetrics  Discharge Summary      Patient Name: Radha Vanegas  MRN: [id number]  Admission Date: 2019  Hospital Length of Stay: 1 days  Discharge Date and Time:  2019 8:15 AM  Attending Physician: Aliza Valera MD   Discharging Provider: Diana Gamble CNM   Primary Care Provider: Anika Johnston MD    HPI:  IUP at 33w2d presents with nausea and vomiting since 1700    FHT: Cat 1 (reassuring)  TOCO :      Irregular, mild.     Procedure(s) (LRB):   SECTION, WITH TUBAL LIGATION (N/A)     Hospital Course:   Admit for IV fluids and zofran  19 Completed second bag of fluids, nausea improved and patient requesting to go home. Will release and send home with refills on Protonix and phenergan, take as directed. Has follow up appts scheduled.          Final Active Diagnoses:    Diagnosis Date Noted POA    Nausea and vomiting [R11.2] 2019 Yes      Problems Resolved During this Admission:        Labs:   CMP   Recent Labs   Lab 19  0505      K 3.8      CO2 15*      BUN 10   CREATININE 0.6   CALCIUM 8.4*   PROT 7.2   ALBUMIN 3.1*   BILITOT 0.6   ALKPHOS 88   AST 23   ALT 20   ANIONGAP 13   ESTGFRAFRICA >60   EGFRNONAA >60    and CBC   Recent Labs   Lab 19  0505   WBC 7.89   HGB 11.5*   HCT 34.8*          Feeding Method: undelivered at release    Immunizations     None          This patient has no babies on file.  Pending Diagnostic Studies:     None          Discharged Condition: good    Disposition: Home or Self Care    Follow Up:    Patient Instructions:   No discharge procedures on file.  Medications:  Current Discharge Medication List      CONTINUE these medications which have CHANGED    Details   pantoprazole (PROTONIX) 20 MG tablet Take 1 tablet (20 mg total) by mouth once daily.  Qty: 30 tablet, Refills: 2    Associated Diagnoses: Heartburn during pregnancy in third trimester      promethazine (PHENERGAN) 12.5 MG Tab  Take 2 tablets (25 mg total) by mouth every 6 (six) hours as needed.  Qty: 20 tablet, Refills: 0    Associated Diagnoses: Nausea and vomiting in pregnancy         CONTINUE these medications which have NOT CHANGED    Details   multivitamin capsule Take 1 capsule by mouth once daily.      ondansetron (ZOFRAN-ODT) 8 MG TbDL Take 1 tablet (8 mg total) by mouth every 6 (six) hours as needed.  Qty: 25 tablet, Refills: 1    Associated Diagnoses: Nausea and vomiting in pregnancy             Diana Gamble CNM  Obstetrics  [facility]

## 2019-11-22 NOTE — DISCHARGE INSTRUCTIONS

## 2019-11-25 ENCOUNTER — PATIENT MESSAGE (OUTPATIENT)
Dept: GASTROENTEROLOGY | Facility: CLINIC | Age: 29
End: 2019-11-25

## 2019-11-25 ENCOUNTER — PATIENT MESSAGE (OUTPATIENT)
Dept: OBSTETRICS AND GYNECOLOGY | Facility: CLINIC | Age: 29
End: 2019-11-25

## 2019-11-26 ENCOUNTER — TELEPHONE (OUTPATIENT)
Dept: GASTROENTEROLOGY | Facility: CLINIC | Age: 29
End: 2019-11-26

## 2019-11-26 ENCOUNTER — ROUTINE PRENATAL (OUTPATIENT)
Dept: OBSTETRICS AND GYNECOLOGY | Facility: CLINIC | Age: 29
End: 2019-11-26
Payer: MEDICAID

## 2019-11-26 VITALS
DIASTOLIC BLOOD PRESSURE: 86 MMHG | BODY MASS INDEX: 36.78 KG/M2 | SYSTOLIC BLOOD PRESSURE: 122 MMHG | WEIGHT: 227.88 LBS

## 2019-11-26 DIAGNOSIS — O09.299 HISTORY OF PRE-ECLAMPSIA IN PRIOR PREGNANCY, CURRENTLY PREGNANT: ICD-10-CM

## 2019-11-26 DIAGNOSIS — Z98.891 HISTORY OF CESAREAN SECTION: ICD-10-CM

## 2019-11-26 DIAGNOSIS — D50.8 IRON DEFICIENCY ANEMIA SECONDARY TO INADEQUATE DIETARY IRON INTAKE: ICD-10-CM

## 2019-11-26 DIAGNOSIS — O09.93 SUPERVISION OF HIGH RISK PREGNANCY IN THIRD TRIMESTER: Primary | ICD-10-CM

## 2019-11-26 PROCEDURE — 99213 PR OFFICE/OUTPT VISIT, EST, LEVL III, 20-29 MIN: ICD-10-PCS | Mod: TH,S$PBB,, | Performed by: ADVANCED PRACTICE MIDWIFE

## 2019-11-26 PROCEDURE — 99213 OFFICE O/P EST LOW 20 MIN: CPT | Mod: PBBFAC,TH,PO | Performed by: ADVANCED PRACTICE MIDWIFE

## 2019-11-26 PROCEDURE — 99213 OFFICE O/P EST LOW 20 MIN: CPT | Mod: TH,S$PBB,, | Performed by: ADVANCED PRACTICE MIDWIFE

## 2019-11-26 PROCEDURE — 99999 PR PBB SHADOW E&M-EST. PATIENT-LVL III: CPT | Mod: PBBFAC,,, | Performed by: ADVANCED PRACTICE MIDWIFE

## 2019-11-26 PROCEDURE — 99999 PR PBB SHADOW E&M-EST. PATIENT-LVL III: ICD-10-PCS | Mod: PBBFAC,,, | Performed by: ADVANCED PRACTICE MIDWIFE

## 2019-11-26 RX ORDER — DIPHENOXYLATE HYDROCHLORIDE AND ATROPINE SULFATE 2.5; .025 MG/1; MG/1
1 TABLET ORAL 4 TIMES DAILY PRN
Qty: 30 TABLET | Refills: 1 | Status: ON HOLD | OUTPATIENT
Start: 2019-11-26 | End: 2020-01-04 | Stop reason: HOSPADM

## 2019-11-26 RX ORDER — ONDANSETRON 4 MG/1
4 TABLET, FILM COATED ORAL DAILY PRN
Qty: 30 TABLET | Refills: 1 | Status: ON HOLD | OUTPATIENT
Start: 2019-11-26 | End: 2020-01-04 | Stop reason: HOSPADM

## 2019-11-26 RX ORDER — FERROUS SULFATE 325(65) MG
325 TABLET ORAL DAILY
Qty: 30 TABLET | Refills: 3 | Status: SHIPPED | OUTPATIENT
Start: 2019-11-26 | End: 2020-06-26

## 2019-11-26 NOTE — TELEPHONE ENCOUNTER
----- Message from Bridget Hu LPN sent at 11/26/2019 11:18 AM CST -----  Patient was unable to make appointment yesterday, here now for OB appointment at [facility]. Patient requesting late morning appointment tomorrow at [facility] if possible.

## 2019-11-26 NOTE — TELEPHONE ENCOUNTER
Called patient and patient stated she was calling to make an appt. Phone hung up in the middle of talking patient. Called patient back and no answer.A vm was left

## 2019-11-29 NOTE — PROGRESS NOTES
"29 y.o. female  at 33w6d   Reports + FM, denies VB, LOF or regular CTX  + diarrhea since last , seen in LND , no vomiting, no fever, no jaundice  BM this am more solid than liquid, pale in color, reassured, continue bland diet, yogurt/bananas to help replenish normal lui  Pt missed GI appt, "overslept" per chart note, exp need to reschedule.   TW lbs   GBS handout provided and reviewed  Reviewed warning signs, normal FKCs, labor precautions and how/when to call.  RTC x 2 wks, call or present sooner prn.     S>D but growth scan  EFW 53% LEONIE normal. al    "

## 2019-12-07 ENCOUNTER — PATIENT MESSAGE (OUTPATIENT)
Dept: OBSTETRICS AND GYNECOLOGY | Facility: CLINIC | Age: 29
End: 2019-12-07

## 2019-12-09 ENCOUNTER — PATIENT MESSAGE (OUTPATIENT)
Dept: OBSTETRICS AND GYNECOLOGY | Facility: CLINIC | Age: 29
End: 2019-12-09

## 2019-12-09 RX ORDER — AZITHROMYCIN 250 MG/1
TABLET, FILM COATED ORAL
Qty: 6 TABLET | Refills: 0 | Status: SHIPPED | OUTPATIENT
Start: 2019-12-09 | End: 2019-12-14

## 2019-12-09 RX ORDER — OSELTAMIVIR PHOSPHATE 75 MG/1
75 CAPSULE ORAL 2 TIMES DAILY
Qty: 10 CAPSULE | Refills: 0 | Status: SHIPPED | OUTPATIENT
Start: 2019-12-09 | End: 2019-12-14

## 2019-12-12 ENCOUNTER — HOSPITAL ENCOUNTER (OUTPATIENT)
Facility: HOSPITAL | Age: 29
LOS: 1 days | Discharge: HOME | End: 2019-12-12
Attending: OBSTETRICS & GYNECOLOGY | Admitting: OBSTETRICS & GYNECOLOGY
Payer: MEDICAID

## 2019-12-12 ENCOUNTER — ROUTINE PRENATAL (OUTPATIENT)
Dept: OBSTETRICS AND GYNECOLOGY | Facility: CLINIC | Age: 29
End: 2019-12-12
Payer: MEDICAID

## 2019-12-12 VITALS
DIASTOLIC BLOOD PRESSURE: 75 MMHG | TEMPERATURE: 98 F | BODY MASS INDEX: 35.61 KG/M2 | OXYGEN SATURATION: 100 % | HEIGHT: 68 IN | WEIGHT: 235 LBS | HEART RATE: 83 BPM | RESPIRATION RATE: 18 BRPM | SYSTOLIC BLOOD PRESSURE: 132 MMHG

## 2019-12-12 VITALS
BODY MASS INDEX: 37.86 KG/M2 | SYSTOLIC BLOOD PRESSURE: 150 MMHG | DIASTOLIC BLOOD PRESSURE: 84 MMHG | WEIGHT: 234.56 LBS

## 2019-12-12 DIAGNOSIS — O16.3 HYPERTENSION AFFECTING PREGNANCY IN THIRD TRIMESTER: ICD-10-CM

## 2019-12-12 DIAGNOSIS — O09.299 HISTORY OF PRE-ECLAMPSIA IN PRIOR PREGNANCY, CURRENTLY PREGNANT: ICD-10-CM

## 2019-12-12 DIAGNOSIS — D50.8 IRON DEFICIENCY ANEMIA SECONDARY TO INADEQUATE DIETARY IRON INTAKE: ICD-10-CM

## 2019-12-12 DIAGNOSIS — O99.891 HISTORY OF POSTPARTUM DEPRESSION, CURRENTLY PREGNANT: ICD-10-CM

## 2019-12-12 DIAGNOSIS — O13.9 GESTATIONAL HYPERTENSION WITHOUT SIGNIFICANT PROTEINURIA, ANTEPARTUM: Primary | ICD-10-CM

## 2019-12-12 DIAGNOSIS — O09.93 SUPERVISION OF HIGH RISK PREGNANCY IN THIRD TRIMESTER: ICD-10-CM

## 2019-12-12 DIAGNOSIS — Z86.59 HISTORY OF POSTPARTUM DEPRESSION, CURRENTLY PREGNANT: ICD-10-CM

## 2019-12-12 DIAGNOSIS — Z98.891 HISTORY OF CESAREAN SECTION: ICD-10-CM

## 2019-12-12 PROBLEM — R11.2 NAUSEA AND VOMITING: Status: RESOLVED | Noted: 2019-11-22 | Resolved: 2019-12-12

## 2019-12-12 LAB
ALBUMIN SERPL BCP-MCNC: 2.7 G/DL (ref 3.5–5.2)
ALP SERPL-CCNC: 119 U/L (ref 55–135)
ALT SERPL W/O P-5'-P-CCNC: 15 U/L (ref 10–44)
ANION GAP SERPL CALC-SCNC: 11 MMOL/L (ref 8–16)
AST SERPL-CCNC: 15 U/L (ref 10–40)
BASOPHILS # BLD AUTO: 0.01 K/UL (ref 0–0.2)
BASOPHILS NFR BLD: 0.1 % (ref 0–1.9)
BILIRUB SERPL-MCNC: 0.4 MG/DL (ref 0.1–1)
BUN SERPL-MCNC: 9 MG/DL (ref 6–20)
CALCIUM SERPL-MCNC: 8.8 MG/DL (ref 8.7–10.5)
CHLORIDE SERPL-SCNC: 106 MMOL/L (ref 95–110)
CO2 SERPL-SCNC: 19 MMOL/L (ref 23–29)
CREAT SERPL-MCNC: 0.7 MG/DL (ref 0.5–1.4)
CREAT UR-MCNC: 40.4 MG/DL (ref 15–325)
DIFFERENTIAL METHOD: ABNORMAL
EOSINOPHIL # BLD AUTO: 0 K/UL (ref 0–0.5)
EOSINOPHIL NFR BLD: 0.1 % (ref 0–8)
ERYTHROCYTE [DISTWIDTH] IN BLOOD BY AUTOMATED COUNT: 13 % (ref 11.5–14.5)
EST. GFR  (AFRICAN AMERICAN): >60 ML/MIN/1.73 M^2
EST. GFR  (NON AFRICAN AMERICAN): >60 ML/MIN/1.73 M^2
GLUCOSE SERPL-MCNC: 68 MG/DL (ref 70–110)
HCT VFR BLD AUTO: 31.4 % (ref 37–48.5)
HGB BLD-MCNC: 10.3 G/DL (ref 12–16)
IMM GRANULOCYTES # BLD AUTO: 0.04 K/UL (ref 0–0.04)
IMM GRANULOCYTES NFR BLD AUTO: 0.6 % (ref 0–0.5)
LYMPHOCYTES # BLD AUTO: 1.3 K/UL (ref 1–4.8)
LYMPHOCYTES NFR BLD: 18.3 % (ref 18–48)
MCH RBC QN AUTO: 30.8 PG (ref 27–31)
MCHC RBC AUTO-ENTMCNC: 32.8 G/DL (ref 32–36)
MCV RBC AUTO: 94 FL (ref 82–98)
MONOCYTES # BLD AUTO: 0.2 K/UL (ref 0.3–1)
MONOCYTES NFR BLD: 3.1 % (ref 4–15)
NEUTROPHILS # BLD AUTO: 5.5 K/UL (ref 1.8–7.7)
NEUTROPHILS NFR BLD: 77.8 % (ref 38–73)
NRBC BLD-RTO: 0 /100 WBC
PLATELET # BLD AUTO: 197 K/UL (ref 150–350)
PMV BLD AUTO: 10.6 FL (ref 9.2–12.9)
POTASSIUM SERPL-SCNC: 3.8 MMOL/L (ref 3.5–5.1)
PROT SERPL-MCNC: 7.2 G/DL (ref 6–8.4)
PROT UR-MCNC: <7 MG/DL (ref 0–15)
PROT/CREAT UR: NORMAL MG/G{CREAT} (ref 0–0.2)
RBC # BLD AUTO: 3.34 M/UL (ref 4–5.4)
SODIUM SERPL-SCNC: 136 MMOL/L (ref 136–145)
WBC # BLD AUTO: 7.11 K/UL (ref 3.9–12.7)

## 2019-12-12 PROCEDURE — 99999 PR PBB SHADOW E&M-EST. PATIENT-LVL III: ICD-10-PCS | Mod: PBBFAC,,, | Performed by: ADVANCED PRACTICE MIDWIFE

## 2019-12-12 PROCEDURE — 99211 OFF/OP EST MAY X REQ PHY/QHP: CPT | Mod: 25,27,TH

## 2019-12-12 PROCEDURE — 80307 DRUG TEST PRSMV CHEM ANLYZR: CPT

## 2019-12-12 PROCEDURE — 80053 COMPREHEN METABOLIC PANEL: CPT

## 2019-12-12 PROCEDURE — 99213 PR OFFICE/OUTPT VISIT, EST, LEVL III, 20-29 MIN: ICD-10-PCS | Mod: TH,25,, | Performed by: MIDWIFE

## 2019-12-12 PROCEDURE — 85025 COMPLETE CBC W/AUTO DIFF WBC: CPT

## 2019-12-12 PROCEDURE — 59025 FETAL NON-STRESS TEST: CPT

## 2019-12-12 PROCEDURE — 84156 ASSAY OF PROTEIN URINE: CPT | Mod: 91

## 2019-12-12 PROCEDURE — 59025 OBTAIN FETAL NONSTRESS TEST (NST): ICD-10-PCS | Mod: 26,,, | Performed by: MIDWIFE

## 2019-12-12 PROCEDURE — 99213 OFFICE O/P EST LOW 20 MIN: CPT | Mod: PBBFAC,TH,PO,25 | Performed by: ADVANCED PRACTICE MIDWIFE

## 2019-12-12 PROCEDURE — 59025 FETAL NON-STRESS TEST: CPT | Mod: 26,,, | Performed by: MIDWIFE

## 2019-12-12 PROCEDURE — 99999 PR PBB SHADOW E&M-EST. PATIENT-LVL III: CPT | Mod: PBBFAC,,, | Performed by: ADVANCED PRACTICE MIDWIFE

## 2019-12-12 PROCEDURE — 99213 OFFICE O/P EST LOW 20 MIN: CPT | Mod: TH,25,, | Performed by: MIDWIFE

## 2019-12-12 PROCEDURE — 87081 CULTURE SCREEN ONLY: CPT

## 2019-12-12 PROCEDURE — 25000003 PHARM REV CODE 250: Performed by: MIDWIFE

## 2019-12-12 RX ORDER — ONDANSETRON 8 MG/1
8 TABLET, ORALLY DISINTEGRATING ORAL EVERY 8 HOURS PRN
Status: DISCONTINUED | OUTPATIENT
Start: 2019-12-12 | End: 2019-12-12 | Stop reason: HOSPADM

## 2019-12-12 RX ORDER — SODIUM CHLORIDE 9 MG/ML
INJECTION, SOLUTION INTRAVENOUS CONTINUOUS
Status: DISCONTINUED | OUTPATIENT
Start: 2019-12-12 | End: 2019-12-12 | Stop reason: HOSPADM

## 2019-12-12 RX ORDER — BUTALBITAL, ACETAMINOPHEN AND CAFFEINE 50; 325; 40 MG/1; MG/1; MG/1
1 TABLET ORAL EVERY 4 HOURS PRN
Status: DISCONTINUED | OUTPATIENT
Start: 2019-12-12 | End: 2019-12-12 | Stop reason: HOSPADM

## 2019-12-12 RX ORDER — OFLOXACIN 3 MG/ML
SOLUTION/ DROPS OPHTHALMIC
Status: ON HOLD | COMMUNITY
Start: 2019-12-11 | End: 2020-01-04 | Stop reason: HOSPADM

## 2019-12-12 RX ORDER — ACETAMINOPHEN 500 MG
500 TABLET ORAL EVERY 6 HOURS PRN
Status: DISCONTINUED | OUTPATIENT
Start: 2019-12-12 | End: 2019-12-12 | Stop reason: SDUPTHER

## 2019-12-12 RX ADMIN — BUTALBITAL, ACETAMINOPHEN, AND CAFFEINE 1 TABLET: 50; 325; 40 TABLET ORAL at 02:12

## 2019-12-12 NOTE — SUBJECTIVE & OBJECTIVE
Obstetric HPI:  Patient presents from office with high blood pressures, headache, visual disturbances and nausea. Has a 7 lb weight gain in 1 week. Denies contractions, active fetal movement, No vaginal bleeding , No loss of fluid     This pregnancy has been complicated by history of drug abuse, hepatitis C, smoker, anemia, history of     OB History    Para Term  AB Living   5 3 3 0 1 3   SAB TAB Ectopic Multiple Live Births   0 1 0 0 3      # Outcome Date GA Lbr Brandon/2nd Weight Sex Delivery Anes PTL Lv   5 Current            4 Term 17 38w2d  2.81 kg (6 lb 3.1 oz) M CS-LTranv Spinal N MARCIANO      Name: KAYODE MTZ      Apgar1: 9  Apgar5: 9   3 Term 14 41w2d  4.321 kg (9 lb 8.4 oz) F CS-LTranv Spinal N MARCIANO      Apgar1: 9  Apgar5: 9   2 Term 13 39w5d  3.544 kg (7 lb 13 oz) M CS-Unspec   MARCIANO   1 TAB              Past Medical History:   Diagnosis Date    ADD (attention deficit disorder)     Anxiety     Depression     Hepatitis     Hep A,B,C    Hypertension     Migraine headache     Panic attack     Postpartum depression associated with first pregnancy     pt verbalized she was hospitalized     Past Surgical History:   Procedure Laterality Date    APPENDECTOMY       SECTION      x1       PTA Medications   Medication Sig    azithromycin (ZITHROMAX Z-ADONIS) 250 MG tablet Take 2 tablets (500 mg) on  Day 1,  followed by 1 tablet (250 mg) once daily on Days 2 through 5.    diphenoxylate-atropine 2.5-0.025 mg (LOMOTIL) 2.5-0.025 mg per tablet Take 1 tablet by mouth 4 (four) times daily as needed for Diarrhea. (Patient not taking: Reported on 2019)    ferrous sulfate (FEOSOL) 325 mg (65 mg iron) Tab tablet Take 1 tablet (325 mg total) by mouth once daily.    multivitamin capsule Take 1 capsule by mouth once daily.    ofloxacin (OCUFLOX) 0.3 % ophthalmic solution     ondansetron (ZOFRAN) 4 MG tablet Take 1 tablet (4 mg total) by mouth daily as needed for  Nausea.    ondansetron (ZOFRAN-ODT) 8 MG TbDL Take 1 tablet (8 mg total) by mouth every 6 (six) hours as needed.    oseltamivir (TAMIFLU) 75 MG capsule Take 1 capsule (75 mg total) by mouth 2 (two) times daily. for 5 days (Patient not taking: Reported on 12/12/2019)    pantoprazole (PROTONIX) 20 MG tablet Take 1 tablet (20 mg total) by mouth once daily.    promethazine (PHENERGAN) 12.5 MG Tab Take 2 tablets (25 mg total) by mouth every 6 (six) hours as needed. (Patient not taking: Reported on 12/12/2019)       Review of patient's allergies indicates:  No Known Allergies     Family History     Problem Relation (Age of Onset)    Hypertension Mother        Tobacco Use    Smoking status: Current Some Day Smoker     Packs/day: 0.25     Types: Cigarettes    Smokeless tobacco: Never Used   Substance and Sexual Activity    Alcohol use: Yes     Comment: OCC    Drug use: No     Comment: previous drug user (heroin, meth)    Sexual activity: Yes     Partners: Male     Birth control/protection: None     Review of Systems   Constitutional: Negative for activity change and fever.   Respiratory: Negative for cough.    Cardiovascular: Negative for chest pain.   Gastrointestinal: Negative for abdominal pain and vomiting.   Genitourinary: Negative for pelvic pain, vaginal bleeding and vaginal discharge.   Musculoskeletal: Negative for back pain.   Neurological: Negative for vertigo and syncope.   Psychiatric/Behavioral: Negative for depression. The patient is not nervous/anxious.       Objective:     Vital Signs (Most Recent):    Vital Signs (24h Range):  BP: (143-150)/(84-97) 150/84        There is no height or weight on file to calculate BMI.    FHT: 135Cat 1 (reassuring)  TOCO:  irrit    Physical Exam:   Constitutional: She is oriented to person, place, and time. She appears well-developed and well-nourished.    HENT:   Head: Normocephalic and atraumatic.    Eyes: EOM are normal.    Neck: Normal range of motion. Neck  supple.    Cardiovascular: Normal rate, regular rhythm, normal heart sounds and intact distal pulses.     Pulmonary/Chest: Effort normal and breath sounds normal.        Abdominal: Soft. Bowel sounds are normal.   gravid             Musculoskeletal: Normal range of motion and moves all extremeties. She exhibits edema (trace BLE).       Neurological: She is alert and oriented to person, place, and time. She has normal reflexes.    Skin: Skin is warm and dry.    Psychiatric: She has a normal mood and affect. Her behavior is normal. Judgment and thought content normal.       Cervix:  Dilation:  deferred   Effacement:    Station:   Presentation:      Significant Labs:  Lab Results   Component Value Date    GROUPTRH O POS 05/27/2019    HEPBSAG Negative 05/27/2019    HEPBSAG Negative 05/27/2019    STREPBCULT No Group B Streptococcus isolated 04/11/2017       I have personallly reviewed all pertinent lab results from the last 24 hours.

## 2019-12-12 NOTE — H&P
[facility] -   Obstetrics  History & Physical    Patient Name: Radha Vanegas  MRN: [id number]  Admission Date: 2019  Primary Care Provider: Anika Johnston MD    Subjective:     Principal Problem:Hypertension affecting pregnancy in third trimester    History of Present Illness:  30 yo  36w1d from office with hypertension, headache, blurred vision and nausea    Obstetric HPI:  Patient presents from office with high blood pressures, headache, visual disturbances and nausea. Has a 7 lb weight gain in 1 week. Denies contractions, active fetal movement, No vaginal bleeding , No loss of fluid     This pregnancy has been complicated by history of drug abuse, hepatitis C, smoker, anemia, history of     OB History    Para Term  AB Living   5 3 3 0 1 3   SAB TAB Ectopic Multiple Live Births   0 1 0 0 3      # Outcome Date GA Lbr Brandon/2nd Weight Sex Delivery Anes PTL Lv   5 Current            4 Term 17 38w2d  2.81 kg (6 lb 3.1 oz) M CS-LTranv Spinal N MARCIANO      Name: KAYODE VANEGAS      Apgar1: 9  Apgar5: 9   3 Term 14 41w2d  4.321 kg (9 lb 8.4 oz) F CS-LTranv Spinal N MARCIANO      Apgar1: 9  Apgar5: 9   2 Term 13 39w5d  3.544 kg (7 lb 13 oz) M CS-Unspec   MARCIANO   1 TAB              Past Medical History:   Diagnosis Date    ADD (attention deficit disorder)     Anxiety     Depression     Hepatitis     Hep A,B,C    Hypertension     Migraine headache     Panic attack     Postpartum depression associated with first pregnancy     pt verbalized she was hospitalized     Past Surgical History:   Procedure Laterality Date    APPENDECTOMY       SECTION      x1       PTA Medications   Medication Sig    azithromycin (ZITHROMAX Z-ADONIS) 250 MG tablet Take 2 tablets (500 mg) on  Day 1,  followed by 1 tablet (250 mg) once daily on Days 2 through 5.    diphenoxylate-atropine 2.5-0.025 mg (LOMOTIL) 2.5-0.025 mg per tablet Take 1 tablet by mouth 4 (four) times daily  as needed for Diarrhea. (Patient not taking: Reported on 12/12/2019)    ferrous sulfate (FEOSOL) 325 mg (65 mg iron) Tab tablet Take 1 tablet (325 mg total) by mouth once daily.    multivitamin capsule Take 1 capsule by mouth once daily.    ofloxacin (OCUFLOX) 0.3 % ophthalmic solution     ondansetron (ZOFRAN) 4 MG tablet Take 1 tablet (4 mg total) by mouth daily as needed for Nausea.    ondansetron (ZOFRAN-ODT) 8 MG TbDL Take 1 tablet (8 mg total) by mouth every 6 (six) hours as needed.    oseltamivir (TAMIFLU) 75 MG capsule Take 1 capsule (75 mg total) by mouth 2 (two) times daily. for 5 days (Patient not taking: Reported on 12/12/2019)    pantoprazole (PROTONIX) 20 MG tablet Take 1 tablet (20 mg total) by mouth once daily.    promethazine (PHENERGAN) 12.5 MG Tab Take 2 tablets (25 mg total) by mouth every 6 (six) hours as needed. (Patient not taking: Reported on 12/12/2019)       Review of patient's allergies indicates:  No Known Allergies     Family History     Problem Relation (Age of Onset)    Hypertension Mother        Tobacco Use    Smoking status: Current Some Day Smoker     Packs/day: 0.25     Types: Cigarettes    Smokeless tobacco: Never Used   Substance and Sexual Activity    Alcohol use: Yes     Comment: OCC    Drug use: No     Comment: previous drug user (heroin, meth)    Sexual activity: Yes     Partners: Male     Birth control/protection: None     Review of Systems   Constitutional: Negative for activity change and fever.   Respiratory: Negative for cough.    Cardiovascular: Negative for chest pain.   Gastrointestinal: Negative for abdominal pain and vomiting.   Genitourinary: Negative for pelvic pain, vaginal bleeding and vaginal discharge.   Musculoskeletal: Negative for back pain.   Neurological: Negative for vertigo and syncope.   Psychiatric/Behavioral: Negative for depression. The patient is not nervous/anxious.       Objective:     Vital Signs (Most Recent):    Vital Signs (24h  Range):  BP: (143-150)/(84-97) 150/84        There is no height or weight on file to calculate BMI.    FHT: 135Cat 1 (reassuring)  TOCO:  irrit    Physical Exam:   Constitutional: She is oriented to person, place, and time. She appears well-developed and well-nourished.    HENT:   Head: Normocephalic and atraumatic.    Eyes: EOM are normal.    Neck: Normal range of motion. Neck supple.    Cardiovascular: Normal rate, regular rhythm, normal heart sounds and intact distal pulses.     Pulmonary/Chest: Effort normal and breath sounds normal.        Abdominal: Soft. Bowel sounds are normal.   gravid             Musculoskeletal: Normal range of motion and moves all extremeties. She exhibits edema (trace BLE).       Neurological: She is alert and oriented to person, place, and time. She has normal reflexes.    Skin: Skin is warm and dry.    Psychiatric: She has a normal mood and affect. Her behavior is normal. Judgment and thought content normal.       Cervix:  Dilation:  deferred   Effacement:    Station:   Presentation:      Significant Labs:  Lab Results   Component Value Date    GROUPTRH O POS 2019    HEPBSAG Negative 2019    HEPBSAG Negative 2019    STREPBCULT No Group B Streptococcus isolated 2017       I have personallly reviewed all pertinent lab results from the last 24 hours.    Assessment/Plan:     29 y.o. female  at 36w1d for:    * Hypertension affecting pregnancy in third trimester   36w1d PIH work up  Observe  NST  CBC, CMP and PCR        Billie Toledo CNM  Obstetrics  [facility]

## 2019-12-12 NOTE — PROGRESS NOTES
"29 y.o. female  at 36w1d  Reports + FM, denies VB, LOF or regular CTX  "feeling out of it today, my head feels like it wants to explode" took ibuprofen bc no tylenol at home  States + nausea-took zofran, c/o blurred vision if she stares at something for a little while, only lasts a minute or so, BP elevated for pt today  TW lbs -7# gain in 2 weeks, BLE no edema, urine dip neg in office today  GBS collected today   Urine dip in office neg, exp to LND for monitoring, pre E labs secondary to CNS s/s   Reviewed warning signs, normal FKCs, labor precautions and how/when to call.  RTC x 1 wk, call or present sooner prn. al      "

## 2019-12-12 NOTE — PROCEDURES
"Radha Vanegas is a 29 y.o. female patient.    Temp: 98.1 °F (36.7 °C) (12/12/19 1231)  Pulse: 76 (12/12/19 1403)  Resp: 20 (12/12/19 1231)  BP: 122/86 (12/12/19 1403)  SpO2: 100 % (12/12/19 1400)  Weight: 106.6 kg (235 lb) (12/12/19 1317)  Height: 5' 8" (172.7 cm) (12/12/19 1317)       Obtain Fetal nonstress test (NST)  Date/Time: 12/12/2019 2:55 PM  Performed by: Billie Toledo CNM  Authorized by: Billie Toledo CNM     Nonstress Test:     Variability:  6-25 BPM    Decelerations:  None    Accelerations:  15 bpm    Acoustic Stimulator: No      Baseline:  135    Uterine Irritability: No      Contractions:  Irregular  Biophysical Profile:     Nonstress Test Interpretation: reactive      Overall Impression:  Reassuring  Post-procedure:     Patient tolerance:  Patient tolerated the procedure well with no immediate complications        Billie Toledo  12/12/2019    "

## 2019-12-12 NOTE — DISCHARGE SUMMARY
[facility] -   Obstetrics  Discharge Summary      Patient Name: Radha Vanegas  MRN: [id number]  Admission Date: 2019  Hospital Length of Stay: 1 days  Discharge Date and Time: 19  Attending Physician: Abram Ng MD   Discharging Provider: Billie Toledo CNM   Primary Care Provider: Anika Johnston MD    HPI: 28 yo  36w1d from office with hypertension, headache, blurred vision and nausea    FHT: 135Cat 1 (reassuring)  TOCO:  irreg    Procedure(s) (LRB):   SECTION, WITH TUBAL LIGATION (N/A)     Hospital Course:    36w1d PIH work up  Observe  NST  CBC, CMP and PCR  19 1445 Labs WNL, Bps 120-130s/70-80s  Discharge home,          Final Active Diagnoses:    Diagnosis Date Noted POA    PRINCIPAL PROBLEM:  Hypertension affecting pregnancy in third trimester [O16.3] 2019 Yes    Smoker [F17.200] 2019 Yes    History of postpartum hemorrhage, currently pregnant [O09.299] 2019 Not Applicable    Iron deficiency anemia [D50.9] 2017 Yes    Hepatitis C [B19.20] 2017 Yes    History of  section [Z98.891] 2017 Not Applicable    H/O drug abuse [F19.11] 2016 Yes      Problems Resolved During this Admission:        Labs: All labs within the past 24 hours have been reviewed      Immunizations     None          This patient has no babies on file.  Pending Diagnostic Studies:     Procedure Component Value Units Date/Time    Drugs of Abuse Screen, Blood [[id number]] Collected:  19 1300    Order Status:  Sent Lab Status:  In process Updated:  19 1323    Specimen:  Blood           Discharged Condition: good    Disposition: Home or Self Care    Follow Up:  Follow-up Information     Please follow up.    Why:  as scheduled and as needed               Patient Instructions:      Diet Adult Regular     Other restrictions (specify):   Order Comments: Modified bed rest     Medications:  Current Discharge Medication List       CONTINUE these medications which have NOT CHANGED    Details   ondansetron (ZOFRAN-ODT) 8 MG TbDL Take 1 tablet (8 mg total) by mouth every 6 (six) hours as needed.  Qty: 25 tablet, Refills: 1    Associated Diagnoses: Nausea and vomiting in pregnancy      pantoprazole (PROTONIX) 20 MG tablet Take 1 tablet (20 mg total) by mouth once daily.  Qty: 30 tablet, Refills: 2    Associated Diagnoses: Heartburn during pregnancy in third trimester      azithromycin (ZITHROMAX Z-ADONIS) 250 MG tablet Take 2 tablets (500 mg) on  Day 1,  followed by 1 tablet (250 mg) once daily on Days 2 through 5.  Qty: 6 tablet, Refills: 0      diphenoxylate-atropine 2.5-0.025 mg (LOMOTIL) 2.5-0.025 mg per tablet Take 1 tablet by mouth 4 (four) times daily as needed for Diarrhea.  Qty: 30 tablet, Refills: 1      ferrous sulfate (FEOSOL) 325 mg (65 mg iron) Tab tablet Take 1 tablet (325 mg total) by mouth once daily.  Qty: 30 tablet, Refills: 3      multivitamin capsule Take 1 capsule by mouth once daily.      ofloxacin (OCUFLOX) 0.3 % ophthalmic solution       ondansetron (ZOFRAN) 4 MG tablet Take 1 tablet (4 mg total) by mouth daily as needed for Nausea.  Qty: 30 tablet, Refills: 1      oseltamivir (TAMIFLU) 75 MG capsule Take 1 capsule (75 mg total) by mouth 2 (two) times daily. for 5 days  Qty: 10 capsule, Refills: 0      promethazine (PHENERGAN) 12.5 MG Tab Take 2 tablets (25 mg total) by mouth every 6 (six) hours as needed.  Qty: 20 tablet, Refills: 0    Associated Diagnoses: Nausea and vomiting in pregnancy             Billie Toledo CNM  Obstetrics  [facility]

## 2019-12-12 NOTE — DISCHARGE INSTRUCTIONS

## 2019-12-12 NOTE — HOSPITAL COURSE
36w1d PI work up  Observe  NST  CBC, CMP and PCR  19 1445 Labs WNL, Bps 120-130s/70-80s  Discharge home,

## 2019-12-13 ENCOUNTER — PATIENT MESSAGE (OUTPATIENT)
Dept: OBSTETRICS AND GYNECOLOGY | Facility: CLINIC | Age: 29
End: 2019-12-13

## 2019-12-16 LAB
AMPHETAMINES SERPL QL: NEGATIVE
BACTERIA SPEC AEROBE CULT: NORMAL
BARBITURATES SERPL QL SCN: NEGATIVE
BENZODIAZ SERPL QL SCN: NEGATIVE
BZE SERPL QL: NEGATIVE
CARBOXYTHC SERPL QL SCN: POSITIVE
ETHANOL SERPL QL SCN: NEGATIVE
METHADONE SERPL QL SCN: NEGATIVE
OPIATES SERPL QL SCN: NEGATIVE
PCP SERPL QL SCN: NEGATIVE
PROPOXYPH SERPL QL: NEGATIVE

## 2019-12-19 ENCOUNTER — ROUTINE PRENATAL (OUTPATIENT)
Dept: OBSTETRICS AND GYNECOLOGY | Facility: CLINIC | Age: 29
End: 2019-12-19
Payer: MEDICAID

## 2019-12-19 VITALS
DIASTOLIC BLOOD PRESSURE: 86 MMHG | SYSTOLIC BLOOD PRESSURE: 126 MMHG | BODY MASS INDEX: 35.55 KG/M2 | WEIGHT: 233.81 LBS

## 2019-12-19 DIAGNOSIS — D50.8 IRON DEFICIENCY ANEMIA SECONDARY TO INADEQUATE DIETARY IRON INTAKE: ICD-10-CM

## 2019-12-19 DIAGNOSIS — Z98.891 HISTORY OF CESAREAN SECTION: ICD-10-CM

## 2019-12-19 DIAGNOSIS — O09.93 SUPERVISION OF HIGH RISK PREGNANCY IN THIRD TRIMESTER: Primary | ICD-10-CM

## 2019-12-19 DIAGNOSIS — B18.2 CHRONIC HEPATITIS C WITHOUT HEPATIC COMA: ICD-10-CM

## 2019-12-19 PROCEDURE — 99999 PR PBB SHADOW E&M-EST. PATIENT-LVL III: ICD-10-PCS | Mod: PBBFAC,,, | Performed by: ADVANCED PRACTICE MIDWIFE

## 2019-12-19 PROCEDURE — 99213 OFFICE O/P EST LOW 20 MIN: CPT | Mod: TH,S$PBB,, | Performed by: ADVANCED PRACTICE MIDWIFE

## 2019-12-19 PROCEDURE — 99213 PR OFFICE/OUTPT VISIT, EST, LEVL III, 20-29 MIN: ICD-10-PCS | Mod: TH,S$PBB,, | Performed by: ADVANCED PRACTICE MIDWIFE

## 2019-12-19 PROCEDURE — 99999 PR PBB SHADOW E&M-EST. PATIENT-LVL III: CPT | Mod: PBBFAC,,, | Performed by: ADVANCED PRACTICE MIDWIFE

## 2019-12-19 PROCEDURE — 99213 OFFICE O/P EST LOW 20 MIN: CPT | Mod: PBBFAC,TH,PO | Performed by: ADVANCED PRACTICE MIDWIFE

## 2019-12-19 NOTE — PROGRESS NOTES
29 y.o. female  at 37w1d   Reports + FM, denies VB, LOF or regular CTX  Doing well without concerns, ready for baby, runs of UC, baby has been really active the last few days  C/o ?? Fluid on underwear for a few days  Spec exam no pooling, thin white BV dc noted, will rx flagy X 7 days  Pt states she cannot make it to hepatology appt if I r/s again. Exp need for treatment  Has c/s date sched, exp preop wash, dsg X 7 days  TW lbs   Reviewed warning signs, normal FKCs, labor precautions and how/when to call.  RTC x 1 wk, call or present sooner prn. al

## 2019-12-20 NOTE — PRE ADMISSION SCREENING
Pre op instructions reviewed with patient per phone:    To confirm, you surgeon has scheduled you for a .  Surgery is scheduled 20 .  Your doctor will instruct on the time of  your surgery.    Please report to [facility] 4th floor at time instructed by your doctor.  Arrive 2 hours prior to surgery        IMPORTANT INSTRUCTIONS!!  Do not eat anything 8 hours prior to surgery.    You may have water and clear juice 3 hours prior to surgery.    OK to brush teeth, no gum, candy or mints!    So not shave pubic hair 7 days prior to surgery      SHOWERING INSTRUCTIONS:   The night before and morning prior to coming to the hospital:    In the shower, it is best practice to wash and rinse your hair with shampoo, rinse completely     Wet entire body and wash with anti-bacterial soap, rinse completely    With your hand, apply one packet of Hibiclens soap to abdomen from under breasts to above pubic bone, gently scrubbing for 5 minutes.  Do not scrub your skin too hard  Avoid getting Hibiclens on your head, face, or genitals (private parts)    Once you have completed the wash, rinse the Hibiclens thoroughly.      Do not wash with regular soap or anti-bacterial soap after using the Hibiclens    Pat yourself dry using clean dry towel.  DO NOT apply any lotions or powder to abdomen.    Put on clean clothes    It is also recommended best practice to remove all artificial nails/polish prior to surgery

## 2019-12-24 ENCOUNTER — ROUTINE PRENATAL (OUTPATIENT)
Dept: OBSTETRICS AND GYNECOLOGY | Facility: CLINIC | Age: 29
End: 2019-12-24
Payer: MEDICAID

## 2019-12-24 ENCOUNTER — LAB VISIT (OUTPATIENT)
Dept: LAB | Facility: HOSPITAL | Age: 29
End: 2019-12-24
Attending: FAMILY MEDICINE
Payer: MEDICAID

## 2019-12-24 VITALS
WEIGHT: 235.56 LBS | DIASTOLIC BLOOD PRESSURE: 82 MMHG | SYSTOLIC BLOOD PRESSURE: 120 MMHG | BODY MASS INDEX: 35.82 KG/M2

## 2019-12-24 DIAGNOSIS — B18.2 CHRONIC HEPATITIS C WITHOUT HEPATIC COMA: ICD-10-CM

## 2019-12-24 DIAGNOSIS — O09.893 NON-COMPLIANT PREGNANT PATIENT, THIRD TRIMESTER: ICD-10-CM

## 2019-12-24 DIAGNOSIS — Z91.199 NON-COMPLIANT PREGNANT PATIENT, THIRD TRIMESTER: ICD-10-CM

## 2019-12-24 DIAGNOSIS — L29.9 ITCHING: ICD-10-CM

## 2019-12-24 DIAGNOSIS — O09.93 SUPERVISION OF HIGH RISK PREGNANCY IN THIRD TRIMESTER: ICD-10-CM

## 2019-12-24 DIAGNOSIS — Z98.891 HISTORY OF CESAREAN SECTION: Primary | ICD-10-CM

## 2019-12-24 PROCEDURE — 99213 PR OFFICE/OUTPT VISIT, EST, LEVL III, 20-29 MIN: ICD-10-PCS | Mod: TH,S$PBB,, | Performed by: ADVANCED PRACTICE MIDWIFE

## 2019-12-24 PROCEDURE — 99999 PR PBB SHADOW E&M-EST. PATIENT-LVL III: CPT | Mod: PBBFAC,,, | Performed by: ADVANCED PRACTICE MIDWIFE

## 2019-12-24 PROCEDURE — 82239 BILE ACIDS TOTAL: CPT

## 2019-12-24 PROCEDURE — 99999 PR PBB SHADOW E&M-EST. PATIENT-LVL III: ICD-10-PCS | Mod: PBBFAC,,, | Performed by: ADVANCED PRACTICE MIDWIFE

## 2019-12-24 PROCEDURE — 99213 OFFICE O/P EST LOW 20 MIN: CPT | Mod: PBBFAC,TH,PO | Performed by: ADVANCED PRACTICE MIDWIFE

## 2019-12-24 PROCEDURE — 87522 HEPATITIS C REVRS TRNSCRPJ: CPT

## 2019-12-24 PROCEDURE — 99213 OFFICE O/P EST LOW 20 MIN: CPT | Mod: TH,S$PBB,, | Performed by: ADVANCED PRACTICE MIDWIFE

## 2019-12-24 RX ORDER — HYDROXYZINE PAMOATE 25 MG/1
25 CAPSULE ORAL 4 TIMES DAILY
Qty: 30 CAPSULE | Refills: 0 | Status: ON HOLD | OUTPATIENT
Start: 2019-12-24 | End: 2020-01-04 | Stop reason: HOSPADM

## 2019-12-24 NOTE — PROGRESS NOTES
"29 y.o. female  at 37w6d   Reports + FM, denies VB, LOF or regular CTX- has some but stop after a few hours, cx int os closed  Complaining of itching all over, exp prob toxins in blood stream from her liver not filtering properly. Pt has missed multiple hepatology appts, stressed importance of compliance today, pt states "yeah". Stat Bile acids, Hep C RNA viral count ordered. Strict daily FM counts, s/s of active labor discussed  sched c/s  @ 7 AM- NPO after midnight, arrive 5 AM, handout on scrub and hibiclens provided today  TW lbs   Reviewed warning signs, normal FKCs, labor precautions and how/when to call.  RTC x as needed, call or present sooner prn. al      "

## 2019-12-27 LAB — BILE AC SERPL-SCNC: 6 MCMOL/L

## 2019-12-28 LAB
HCV RNA SERPL NAA+PROBE-LOG IU: 5.85 LOG (10) IU/ML
HCV RNA SERPL QL NAA+PROBE: DETECTED IU/ML
HCV RNA SPEC NAA+PROBE-ACNC: ABNORMAL IU/ML

## 2019-12-29 ENCOUNTER — PATIENT MESSAGE (OUTPATIENT)
Dept: OBSTETRICS AND GYNECOLOGY | Facility: CLINIC | Age: 29
End: 2019-12-29

## 2020-01-02 ENCOUNTER — ANESTHESIA EVENT (OUTPATIENT)
Dept: OBSTETRICS AND GYNECOLOGY | Facility: HOSPITAL | Age: 30
End: 2020-01-02
Payer: MEDICAID

## 2020-01-02 ENCOUNTER — ANESTHESIA (OUTPATIENT)
Dept: OBSTETRICS AND GYNECOLOGY | Facility: HOSPITAL | Age: 30
End: 2020-01-02
Payer: MEDICAID

## 2020-01-02 ENCOUNTER — HOSPITAL ENCOUNTER (INPATIENT)
Facility: HOSPITAL | Age: 30
LOS: 2 days | Discharge: HOME | End: 2020-01-04
Attending: OBSTETRICS & GYNECOLOGY | Admitting: OBSTETRICS & GYNECOLOGY
Payer: MEDICAID

## 2020-01-02 DIAGNOSIS — Z98.891 STATUS POST REPEAT LOW TRANSVERSE CESAREAN SECTION: Primary | ICD-10-CM

## 2020-01-02 PROBLEM — Z30.2 STATUS POST TUBAL LIGATION AT TIME OF DELIVERY, CURRENT HOSPITALIZATION: Status: ACTIVE | Noted: 2020-01-02

## 2020-01-02 LAB
ABO + RH BLD: NORMAL
AMPHET+METHAMPHET UR QL: NEGATIVE
BARBITURATES UR QL SCN>200 NG/ML: NEGATIVE
BASOPHILS # BLD AUTO: 0.02 K/UL (ref 0–0.2)
BASOPHILS NFR BLD: 0.3 % (ref 0–1.9)
BENZODIAZ UR QL SCN>200 NG/ML: NEGATIVE
BLD GP AB SCN CELLS X3 SERPL QL: NORMAL
BZE UR QL SCN: NEGATIVE
CANNABINOIDS UR QL SCN: NORMAL
CREAT UR-MCNC: 161.2 MG/DL (ref 15–325)
DIFFERENTIAL METHOD: ABNORMAL
EOSINOPHIL # BLD AUTO: 0 K/UL (ref 0–0.5)
EOSINOPHIL NFR BLD: 0.3 % (ref 0–8)
ERYTHROCYTE [DISTWIDTH] IN BLOOD BY AUTOMATED COUNT: 13.1 % (ref 11.5–14.5)
HCT VFR BLD AUTO: 33.3 % (ref 37–48.5)
HGB BLD-MCNC: 10.8 G/DL (ref 12–16)
IMM GRANULOCYTES # BLD AUTO: 0.02 K/UL (ref 0–0.04)
IMM GRANULOCYTES NFR BLD AUTO: 0.3 % (ref 0–0.5)
LYMPHOCYTES # BLD AUTO: 1.5 K/UL (ref 1–4.8)
LYMPHOCYTES NFR BLD: 25.3 % (ref 18–48)
MCH RBC QN AUTO: 30 PG (ref 27–31)
MCHC RBC AUTO-ENTMCNC: 32.4 G/DL (ref 32–36)
MCV RBC AUTO: 93 FL (ref 82–98)
METHADONE UR QL SCN>300 NG/ML: NEGATIVE
MONOCYTES # BLD AUTO: 0.4 K/UL (ref 0.3–1)
MONOCYTES NFR BLD: 7.2 % (ref 4–15)
NEUTROPHILS # BLD AUTO: 4 K/UL (ref 1.8–7.7)
NEUTROPHILS NFR BLD: 66.6 % (ref 38–73)
NRBC BLD-RTO: 0 /100 WBC
OPIATES UR QL SCN: NEGATIVE
PCP UR QL SCN>25 NG/ML: NEGATIVE
PLATELET # BLD AUTO: 188 K/UL (ref 150–350)
PMV BLD AUTO: 11.6 FL (ref 9.2–12.9)
RBC # BLD AUTO: 3.6 M/UL (ref 4–5.4)
TOXICOLOGY INFORMATION: NORMAL
WBC # BLD AUTO: 6 K/UL (ref 3.9–12.7)

## 2020-01-02 PROCEDURE — 63600175 PHARM REV CODE 636 W HCPCS: Performed by: OBSTETRICS & GYNECOLOGY

## 2020-01-02 PROCEDURE — 27200688 HC TRAY, SPINAL-HYPER/ ISOBARIC: Performed by: ANESTHESIOLOGY

## 2020-01-02 PROCEDURE — 63600175 PHARM REV CODE 636 W HCPCS: Performed by: NURSE ANESTHETIST, CERTIFIED REGISTERED

## 2020-01-02 PROCEDURE — 88302 TISSUE EXAM BY PATHOLOGIST: CPT | Mod: 59 | Performed by: PATHOLOGY

## 2020-01-02 PROCEDURE — S0020 INJECTION, BUPIVICAINE HYDRO: HCPCS | Performed by: ANESTHESIOLOGY

## 2020-01-02 PROCEDURE — 36000680 HC C/S TUBAL LIGATION LEVEL I

## 2020-01-02 PROCEDURE — 58611 PR LIGATION,FALLOPIAN TUBE W/C-SECTION: ICD-10-PCS | Mod: ,,, | Performed by: OBSTETRICS & GYNECOLOGY

## 2020-01-02 PROCEDURE — 58611 PR LIGATION,FALLOPIAN TUBE W/C-SECTION: ICD-10-PCS | Mod: AS,,, | Performed by: PHYSICIAN ASSISTANT

## 2020-01-02 PROCEDURE — 59514 PR CESAREAN DELIVERY ONLY: ICD-10-PCS | Mod: GB,,, | Performed by: OBSTETRICS & GYNECOLOGY

## 2020-01-02 PROCEDURE — 58611 LIGATE OVIDUCT(S) ADD-ON: CPT | Mod: ,,, | Performed by: OBSTETRICS & GYNECOLOGY

## 2020-01-02 PROCEDURE — 25000003 PHARM REV CODE 250: Performed by: OBSTETRICS & GYNECOLOGY

## 2020-01-02 PROCEDURE — 88302 PR  SURG PATH,LEVEL II: ICD-10-PCS | Mod: 26,,, | Performed by: PATHOLOGY

## 2020-01-02 PROCEDURE — 88302 TISSUE EXAM BY PATHOLOGIST: CPT | Mod: 26,,, | Performed by: PATHOLOGY

## 2020-01-02 PROCEDURE — 80307 DRUG TEST PRSMV CHEM ANLYZR: CPT

## 2020-01-02 PROCEDURE — 59514 CESAREAN DELIVERY ONLY: CPT | Mod: GB,,, | Performed by: OBSTETRICS & GYNECOLOGY

## 2020-01-02 PROCEDURE — 37000008 HC ANESTHESIA 1ST 15 MINUTES: Performed by: OBSTETRICS & GYNECOLOGY

## 2020-01-02 PROCEDURE — 37000009 HC ANESTHESIA EA ADD 15 MINS: Performed by: OBSTETRICS & GYNECOLOGY

## 2020-01-02 PROCEDURE — 11000001 HC ACUTE MED/SURG PRIVATE ROOM

## 2020-01-02 PROCEDURE — 25000003 PHARM REV CODE 250: Performed by: ANESTHESIOLOGY

## 2020-01-02 PROCEDURE — 58611 LIGATE OVIDUCT(S) ADD-ON: CPT | Mod: AS,,, | Performed by: PHYSICIAN ASSISTANT

## 2020-01-02 PROCEDURE — 85025 COMPLETE CBC W/AUTO DIFF WBC: CPT

## 2020-01-02 PROCEDURE — 36415 COLL VENOUS BLD VENIPUNCTURE: CPT

## 2020-01-02 PROCEDURE — 59514 CESAREAN DELIVERY ONLY: CPT | Mod: AS,GB,, | Performed by: PHYSICIAN ASSISTANT

## 2020-01-02 PROCEDURE — 63600175 PHARM REV CODE 636 W HCPCS: Performed by: ANESTHESIOLOGY

## 2020-01-02 PROCEDURE — 62322 NJX INTERLAMINAR LMBR/SAC: CPT | Performed by: NURSE ANESTHETIST, CERTIFIED REGISTERED

## 2020-01-02 PROCEDURE — 86901 BLOOD TYPING SEROLOGIC RH(D): CPT

## 2020-01-02 PROCEDURE — 25000003 PHARM REV CODE 250: Performed by: NURSE ANESTHETIST, CERTIFIED REGISTERED

## 2020-01-02 PROCEDURE — 59514 PR CESAREAN DELIVERY ONLY: ICD-10-PCS | Mod: AS,GB,, | Performed by: PHYSICIAN ASSISTANT

## 2020-01-02 PROCEDURE — 51702 INSERT TEMP BLADDER CATH: CPT

## 2020-01-02 RX ORDER — PRENATAL WITH FERROUS FUM AND FOLIC ACID 3080; 920; 120; 400; 22; 1.84; 3; 20; 10; 1; 12; 200; 27; 25; 2 [IU]/1; [IU]/1; MG/1; [IU]/1; MG/1; MG/1; MG/1; MG/1; MG/1; MG/1; UG/1; MG/1; MG/1; MG/1; MG/1
1 TABLET ORAL DAILY
Status: DISCONTINUED | OUTPATIENT
Start: 2020-01-02 | End: 2020-01-04 | Stop reason: HOSPADM

## 2020-01-02 RX ORDER — IBUPROFEN 800 MG/1
800 TABLET ORAL EVERY 8 HOURS
Status: DISCONTINUED | OUTPATIENT
Start: 2020-01-03 | End: 2020-01-04 | Stop reason: HOSPADM

## 2020-01-02 RX ORDER — DIPHENHYDRAMINE HYDROCHLORIDE 50 MG/ML
INJECTION INTRAMUSCULAR; INTRAVENOUS
Status: DISCONTINUED | OUTPATIENT
Start: 2020-01-02 | End: 2020-01-02

## 2020-01-02 RX ORDER — OXYTOCIN/RINGER'S LACTATE 30/500 ML
95 PLASTIC BAG, INJECTION (ML) INTRAVENOUS ONCE
Status: COMPLETED | OUTPATIENT
Start: 2020-01-02 | End: 2020-01-02

## 2020-01-02 RX ORDER — PANTOPRAZOLE SODIUM 40 MG/1
40 TABLET, DELAYED RELEASE ORAL DAILY
Status: DISCONTINUED | OUTPATIENT
Start: 2020-01-02 | End: 2020-01-04 | Stop reason: HOSPADM

## 2020-01-02 RX ORDER — HYDROCODONE BITARTRATE AND ACETAMINOPHEN 5; 325 MG/1; MG/1
1 TABLET ORAL EVERY 4 HOURS PRN
Status: DISCONTINUED | OUTPATIENT
Start: 2020-01-02 | End: 2020-01-04

## 2020-01-02 RX ORDER — HYDROCORTISONE 25 MG/G
CREAM TOPICAL 3 TIMES DAILY PRN
Status: DISCONTINUED | OUTPATIENT
Start: 2020-01-02 | End: 2020-01-04 | Stop reason: HOSPADM

## 2020-01-02 RX ORDER — PROMETHAZINE HYDROCHLORIDE 25 MG/ML
INJECTION, SOLUTION INTRAMUSCULAR; INTRAVENOUS
Status: DISCONTINUED | OUTPATIENT
Start: 2020-01-02 | End: 2020-01-02

## 2020-01-02 RX ORDER — KETOROLAC TROMETHAMINE 30 MG/ML
30 INJECTION, SOLUTION INTRAMUSCULAR; INTRAVENOUS EVERY 8 HOURS
Status: COMPLETED | OUTPATIENT
Start: 2020-01-02 | End: 2020-01-03

## 2020-01-02 RX ORDER — AMMONIA 15 % (W/V)
0.3 AMPUL (EA) INHALATION CONTINUOUS PRN
Status: DISCONTINUED | OUTPATIENT
Start: 2020-01-02 | End: 2020-01-04 | Stop reason: HOSPADM

## 2020-01-02 RX ORDER — CHLORHEXIDINE GLUCONATE ORAL RINSE 1.2 MG/ML
10 SOLUTION DENTAL 2 TIMES DAILY
Status: DISCONTINUED | OUTPATIENT
Start: 2020-01-02 | End: 2020-01-04 | Stop reason: HOSPADM

## 2020-01-02 RX ORDER — DIPHENHYDRAMINE HCL 25 MG
25 CAPSULE ORAL EVERY 4 HOURS PRN
Status: DISCONTINUED | OUTPATIENT
Start: 2020-01-02 | End: 2020-01-04 | Stop reason: HOSPADM

## 2020-01-02 RX ORDER — CEFAZOLIN SODIUM 1 G/3ML
INJECTION, POWDER, FOR SOLUTION INTRAMUSCULAR; INTRAVENOUS
Status: DISCONTINUED | OUTPATIENT
Start: 2020-01-02 | End: 2020-01-02

## 2020-01-02 RX ORDER — ONDANSETRON 8 MG/1
8 TABLET, ORALLY DISINTEGRATING ORAL EVERY 8 HOURS PRN
Status: DISCONTINUED | OUTPATIENT
Start: 2020-01-02 | End: 2020-01-04 | Stop reason: HOSPADM

## 2020-01-02 RX ORDER — ACETAMINOPHEN 325 MG/1
650 TABLET ORAL EVERY 6 HOURS PRN
Status: DISCONTINUED | OUTPATIENT
Start: 2020-01-02 | End: 2020-01-04 | Stop reason: HOSPADM

## 2020-01-02 RX ORDER — HYDROCODONE BITARTRATE AND ACETAMINOPHEN 10; 325 MG/1; MG/1
1 TABLET ORAL EVERY 4 HOURS PRN
Status: DISCONTINUED | OUTPATIENT
Start: 2020-01-02 | End: 2020-01-04

## 2020-01-02 RX ORDER — MISOPROSTOL 200 UG/1
200 TABLET ORAL ONCE AS NEEDED
Status: DISCONTINUED | OUTPATIENT
Start: 2020-01-02 | End: 2020-01-04 | Stop reason: HOSPADM

## 2020-01-02 RX ORDER — EPHEDRINE SULFATE 50 MG/ML
INJECTION, SOLUTION INTRAVENOUS
Status: DISCONTINUED | OUTPATIENT
Start: 2020-01-02 | End: 2020-01-02

## 2020-01-02 RX ORDER — BUPIVACAINE HYDROCHLORIDE 7.5 MG/ML
INJECTION, SOLUTION EPIDURAL; RETROBULBAR
Status: COMPLETED | OUTPATIENT
Start: 2020-01-02 | End: 2020-01-02

## 2020-01-02 RX ORDER — SODIUM CHLORIDE, SODIUM LACTATE, POTASSIUM CHLORIDE, CALCIUM CHLORIDE 600; 310; 30; 20 MG/100ML; MG/100ML; MG/100ML; MG/100ML
INJECTION, SOLUTION INTRAVENOUS CONTINUOUS
Status: DISCONTINUED | OUTPATIENT
Start: 2020-01-02 | End: 2020-01-02

## 2020-01-02 RX ORDER — AMOXICILLIN 250 MG
1 CAPSULE ORAL NIGHTLY PRN
Status: DISCONTINUED | OUTPATIENT
Start: 2020-01-02 | End: 2020-01-04 | Stop reason: HOSPADM

## 2020-01-02 RX ORDER — OXYTOCIN/RINGER'S LACTATE 30/500 ML
PLASTIC BAG, INJECTION (ML) INTRAVENOUS CONTINUOUS PRN
Status: DISCONTINUED | OUTPATIENT
Start: 2020-01-02 | End: 2020-01-02

## 2020-01-02 RX ORDER — BISACODYL 10 MG
10 SUPPOSITORY, RECTAL RECTAL ONCE AS NEEDED
Status: DISCONTINUED | OUTPATIENT
Start: 2020-01-02 | End: 2020-01-04 | Stop reason: HOSPADM

## 2020-01-02 RX ORDER — SIMETHICONE 80 MG
1 TABLET,CHEWABLE ORAL EVERY 6 HOURS PRN
Status: DISCONTINUED | OUTPATIENT
Start: 2020-01-02 | End: 2020-01-03

## 2020-01-02 RX ORDER — KETOROLAC TROMETHAMINE 30 MG/ML
INJECTION, SOLUTION INTRAMUSCULAR; INTRAVENOUS
Status: DISCONTINUED | OUTPATIENT
Start: 2020-01-02 | End: 2020-01-02

## 2020-01-02 RX ORDER — DOCUSATE SODIUM 100 MG/1
100 CAPSULE, LIQUID FILLED ORAL DAILY
Status: DISCONTINUED | OUTPATIENT
Start: 2020-01-02 | End: 2020-01-04 | Stop reason: HOSPADM

## 2020-01-02 RX ORDER — MORPHINE SULFATE 1 MG/ML
INJECTION, SOLUTION EPIDURAL; INTRATHECAL; INTRAVENOUS
Status: COMPLETED | OUTPATIENT
Start: 2020-01-02 | End: 2020-01-02

## 2020-01-02 RX ORDER — DIPHENHYDRAMINE HYDROCHLORIDE 50 MG/ML
25 INJECTION INTRAMUSCULAR; INTRAVENOUS EVERY 4 HOURS PRN
Status: DISCONTINUED | OUTPATIENT
Start: 2020-01-02 | End: 2020-01-04 | Stop reason: HOSPADM

## 2020-01-02 RX ORDER — ONDANSETRON 2 MG/ML
INJECTION INTRAMUSCULAR; INTRAVENOUS
Status: DISCONTINUED | OUTPATIENT
Start: 2020-01-02 | End: 2020-01-02

## 2020-01-02 RX ADMIN — MORPHINE SULFATE 4.8 MG: 1 INJECTION, SOLUTION EPIDURAL; INTRATHECAL; INTRAVENOUS at 08:01

## 2020-01-02 RX ADMIN — SODIUM CHLORIDE, SODIUM LACTATE, POTASSIUM CHLORIDE, AND CALCIUM CHLORIDE 1000 ML: .6; .31; .03; .02 INJECTION, SOLUTION INTRAVENOUS at 05:01

## 2020-01-02 RX ADMIN — KETOROLAC TROMETHAMINE 30 MG: 30 INJECTION, SOLUTION INTRAMUSCULAR; INTRAVENOUS at 11:01

## 2020-01-02 RX ADMIN — SODIUM CHLORIDE, SODIUM LACTATE, POTASSIUM CHLORIDE, AND CALCIUM CHLORIDE: 600; 310; 30; 20 INJECTION, SOLUTION INTRAVENOUS at 07:01

## 2020-01-02 RX ADMIN — BUPIVACAINE HYDROCHLORIDE 1.6 ML: 7.5 INJECTION, SOLUTION EPIDURAL; RETROBULBAR at 07:01

## 2020-01-02 RX ADMIN — PANTOPRAZOLE SODIUM 40 MG: 40 TABLET, DELAYED RELEASE ORAL at 04:01

## 2020-01-02 RX ADMIN — KETOROLAC TROMETHAMINE 30 MG: 30 INJECTION, SOLUTION INTRAMUSCULAR at 08:01

## 2020-01-02 RX ADMIN — EPHEDRINE SULFATE 25 MG: 50 INJECTION, SOLUTION INTRAMUSCULAR; INTRAVENOUS; SUBCUTANEOUS at 07:01

## 2020-01-02 RX ADMIN — PRENATAL VIT W/ FE FUMARATE-FA TAB 27-0.8 MG 1 TABLET: 27-0.8 TAB at 10:01

## 2020-01-02 RX ADMIN — CHLORHEXIDINE GLUCONATE 0.12% ORAL RINSE 10 ML: 1.2 LIQUID ORAL at 09:01

## 2020-01-02 RX ADMIN — SODIUM CHLORIDE, SODIUM LACTATE, POTASSIUM CHLORIDE, AND CALCIUM CHLORIDE 1000 ML: .6; .31; .03; .02 INJECTION, SOLUTION INTRAVENOUS at 06:01

## 2020-01-02 RX ADMIN — KETOROLAC TROMETHAMINE 30 MG: 30 INJECTION, SOLUTION INTRAMUSCULAR; INTRAVENOUS at 04:01

## 2020-01-02 RX ADMIN — DIPHENHYDRAMINE HYDROCHLORIDE 25 MG: 50 INJECTION INTRAMUSCULAR; INTRAVENOUS at 08:01

## 2020-01-02 RX ADMIN — MORPHINE SULFATE 5 MG: 1 INJECTION, SOLUTION EPIDURAL; INTRATHECAL; INTRAVENOUS at 07:01

## 2020-01-02 RX ADMIN — PROMETHAZINE HYDROCHLORIDE 12.5 MG: 25 INJECTION INTRAMUSCULAR; INTRAVENOUS at 07:01

## 2020-01-02 RX ADMIN — Medication 95 MILLI-UNITS/MIN: at 08:01

## 2020-01-02 RX ADMIN — Medication 334 ML/HR: at 07:01

## 2020-01-02 RX ADMIN — CHLORHEXIDINE GLUCONATE 0.12% ORAL RINSE 10 ML: 1.2 LIQUID ORAL at 10:01

## 2020-01-02 RX ADMIN — MORPHINE SULFATE 0.2 MG: 1 INJECTION, SOLUTION EPIDURAL; INTRATHECAL; INTRAVENOUS at 07:01

## 2020-01-02 RX ADMIN — CEFAZOLIN 2 G: 1 INJECTION, POWDER, FOR SOLUTION INTRAMUSCULAR; INTRAVENOUS at 07:01

## 2020-01-02 RX ADMIN — HYDROCODONE BITARTRATE AND ACETAMINOPHEN 1 TABLET: 10; 325 TABLET ORAL at 10:01

## 2020-01-02 RX ADMIN — HYDROCODONE BITARTRATE AND ACETAMINOPHEN 1 TABLET: 10; 325 TABLET ORAL at 07:01

## 2020-01-02 RX ADMIN — ONDANSETRON 4 MG: 2 INJECTION, SOLUTION INTRAMUSCULAR; INTRAVENOUS at 07:01

## 2020-01-02 RX ADMIN — HYDROCODONE BITARTRATE AND ACETAMINOPHEN 1 TABLET: 10; 325 TABLET ORAL at 11:01

## 2020-01-02 RX ADMIN — SODIUM CHLORIDE, SODIUM LACTATE, POTASSIUM CHLORIDE, AND CALCIUM CHLORIDE: 600; 310; 30; 20 INJECTION, SOLUTION INTRAVENOUS at 08:01

## 2020-01-02 RX ADMIN — SODIUM CHLORIDE, SODIUM LACTATE, POTASSIUM CHLORIDE, AND CALCIUM CHLORIDE: 600; 310; 30; 20 INJECTION, SOLUTION INTRAVENOUS at 06:01

## 2020-01-02 RX ADMIN — DOCUSATE SODIUM 100 MG: 100 CAPSULE, LIQUID FILLED ORAL at 10:01

## 2020-01-02 NOTE — H&P
[facility] -   Obstetrics  History & Physical    Patient Name: Radha Vanegas  MRN: [id number]  Admission Date: 2020  Primary Care Provider: Anika Johnston MD    Subjective:     Principal Problem:History of  section    History of Present Illness:  30 yo  with IUP at 39w1d.  Pt is here for scheduled delivery and permanent sterilization.  Pt reports no complaints and is ready for surgery..    Obstetric HPI:  Patient reports None contractions, active fetal movement, No vaginal bleeding , No loss of fluid     This pregnancy has been complicated by History of  Section, Undesired Fertility, HCV (non-compliant with Hepatology follow up), History of Substance Abuse    OB History    Para Term  AB Living   5 3 3 0 1 3   SAB TAB Ectopic Multiple Live Births   0 1 0 0 3      # Outcome Date GA Lbr Brandon/2nd Weight Sex Delivery Anes PTL Lv   5 Current            4 Term 17 38w2d  2.81 kg (6 lb 3.1 oz) M CS-LTranv Spinal N MARCIANO      Name: KAYODE VANEGAS      Apgar1: 9  Apgar5: 9   3 Term 14 41w2d  4.321 kg (9 lb 8.4 oz) F CS-LTranv Spinal N MARCIANO      Apgar1: 9  Apgar5: 9   2 Term 13 39w5d  3.544 kg (7 lb 13 oz) M CS-Unspec   MARCIANO   1 TAB              Past Medical History:   Diagnosis Date    ADD (attention deficit disorder)     Anxiety     Depression     Hepatitis     Hep A,B,C    Hypertension     Migraine headache     Panic attack     Postpartum depression associated with first pregnancy     pt verbalized she was hospitalized     Past Surgical History:   Procedure Laterality Date    APPENDECTOMY       SECTION      x1       PTA Medications   Medication Sig    diphenoxylate-atropine 2.5-0.025 mg (LOMOTIL) 2.5-0.025 mg per tablet Take 1 tablet by mouth 4 (four) times daily as needed for Diarrhea. (Patient not taking: Reported on 2019)    ferrous sulfate (FEOSOL) 325 mg (65 mg iron) Tab tablet Take 1 tablet (325 mg total) by mouth once  daily.    hydrOXYzine pamoate (VISTARIL) 25 MG Cap Take 1 capsule (25 mg total) by mouth 4 (four) times daily.    multivitamin capsule Take 1 capsule by mouth once daily.    ofloxacin (OCUFLOX) 0.3 % ophthalmic solution     ondansetron (ZOFRAN) 4 MG tablet Take 1 tablet (4 mg total) by mouth daily as needed for Nausea.    ondansetron (ZOFRAN-ODT) 8 MG TbDL Take 1 tablet (8 mg total) by mouth every 6 (six) hours as needed.    pantoprazole (PROTONIX) 20 MG tablet Take 1 tablet (20 mg total) by mouth once daily.    promethazine (PHENERGAN) 12.5 MG Tab Take 2 tablets (25 mg total) by mouth every 6 (six) hours as needed.       Review of patient's allergies indicates:  No Known Allergies     Family History     Problem Relation (Age of Onset)    Hypertension Mother        Tobacco Use    Smoking status: Current Some Day Smoker     Packs/day: 0.25     Types: Cigarettes    Smokeless tobacco: Never Used   Substance and Sexual Activity    Alcohol use: Yes     Comment: OCC    Drug use: Yes     Types: Marijuana     Comment: previous drug user (heroin, meth)    Sexual activity: Yes     Partners: Male     Birth control/protection: None     Review of Systems   Constitutional: Positive for fatigue. Negative for activity change, appetite change, chills, fever and unexpected weight change.   Respiratory: Negative for shortness of breath.    Cardiovascular: Negative for chest pain, palpitations and leg swelling.   Gastrointestinal: Negative for abdominal pain, bloating, blood in stool, constipation, diarrhea, nausea and vomiting.   Genitourinary: Negative for dysuria, flank pain, frequency, genital sores, hematuria, pelvic pain, urgency, vaginal bleeding, vaginal discharge, vaginal pain, urinary incontinence, vaginal dryness and vaginal odor.   Musculoskeletal: Positive for back pain.   Integumentary:  Negative for breast mass, nipple discharge, breast skin changes and breast tenderness.   Neurological: Negative for syncope  and headaches.   Breast: Negative for asymmetry, lump, mass, mastodynia, nipple discharge, skin changes and tenderness     Objective:     Vital Signs (Most Recent):  Temp: 98.4 °F (36.9 °C) (20)  Pulse: 79 (20)  BP: (!) 142/86 (20)  SpO2: 100 % (20) Vital Signs (24h Range):  Temp:  [98.4 °F (36.9 °C)] 98.4 °F (36.9 °C)  Pulse:  [73-79] 79  SpO2:  [82 %-100 %] 100 %  BP: (135-142)/(84-86) 142/86     Weight: 106.8 kg (235 lb 7.2 oz)  Body mass index is 35.8 kg/m².    FHT: Cat 1 (reassuring)  TOCO:      Physical Exam:   Constitutional: She is oriented to person, place, and time. She appears well-developed and well-nourished. No distress.    HENT:   Head: Normocephalic and atraumatic.    Eyes: Pupils are equal, round, and reactive to light. EOM are normal.    Neck: Normal range of motion.    Cardiovascular: Normal rate, regular rhythm and normal heart sounds.     Pulmonary/Chest: Effort normal and breath sounds normal.        Abdominal: Soft. Bowel sounds are normal. She exhibits no distension. There is no tenderness.             Musculoskeletal: Normal range of motion and moves all extremeties. She exhibits no edema or tenderness.       Neurological: She is alert and oriented to person, place, and time.    Skin: Skin is warm and dry.    Psychiatric: She has a normal mood and affect. Her behavior is normal. Thought content normal.       Significant Labs:  Lab Results   Component Value Date    GROUPTRH O POS 2019    HEPBSAG Negative 2019    HEPBSAG Negative 2019    STREPBCULT No Group B Streptococcus isolated 2019       I have personallly reviewed all pertinent lab results from the last 24 hours.    Assessment/Plan:     29 y.o. female  at 39w1d for:    * History of  section  Admit for scheduled delivery.  Procedure details, indications, risks, benefits, and alternatives were reviewed with pt.  Pt voiced understanding and desires to proceed  with C/S with BTL.  Hospital consents were reviewed with pt and signed.  Pre-operative instructions were given.    Hepatitis C  Non-compliant with Hepatology follow up.    H/O drug abuse  Sober for past 3 yrs.  UDS on admit.        Abram Ng MD  Obstetrics  [facility]

## 2020-01-02 NOTE — L&D DELIVERY NOTE
Delivery Information for Navjot Vanegas    Birth information:  YOB: 2022   Time of birth: 7:39 AM   Sex: male   Head Delivery Date/Time: 2020  7:39 AM   Delivery type: , Low Transverse   Gestational Age: 39w1d    Delivery Providers    Delivering clinician:  Abram Ng MD   Provider Role    Wanda Campbell, [facility]    Johanna Willis, RN Registered Nurse    Sarah Beck, RN Registered Nurse    Danielle Nick PA-C Physician Assistant    Earl Charlton, CRNA Nurse Anesthetist    Manuel Martinez, CRNA Nurse Anesthetist    Joana Suarez, RN Registered Nurse            Measurements    Weight:    Length:           Apgars    Living status:  Living  Apgars:   1 min.:   5 min.:   10 min.:   15 min.:   20 min.:     Skin color:   1  1       Heart rate:   2  2       Reflex irritability:   2  2       Muscle tone:   2  2       Respiratory effort:   2  2       Total:   9  9       Apgars assigned by:  BRAN BECK/ PRAVEEN SUAREZ RN         Operative Delivery    Forceps attempted?:  No  Vacuum extractor attempted?:  No         Shoulder Dystocia    Shoulder dystocia present?:  No           Presentation    Presentation:  Vertex           Interventions/Resuscitation    Method:  Bulb Suctioning, Tactile Stimulation, Deep Suctioning       Cord    Vessels:  3 vessels  Complications:  Nuchal  Nuchal Intervention:  reduced  Nuchal Cord Description:  loose nuchal cord  Number of Loops:  1  Delayed Cord Clamping?:  Yes  Cord Clamped Date/Time:  2020  7:40 AM  Cord Blood Disposition:  Lab  Gases Sent?:  No  Stem Cell Collection (by MD):  No       Placenta    Placenta delivery date/time:  2020 0742  Placenta removal:  Spontaneous  Placenta appearance:  Intact  Placenta disposition:  discarded           Labor Events:       labor: No     Labor Onset Date/Time:         Dilation Complete Date/Time:         Start Pushing Date/Time:       Rupture Date/Time:              Rupture type:            Fluid Amount:        Fluid Color:        Fluid Odor:        Membrane Status (PeriCalm):        Rupture Date/Time (PeriCalm):        Fluid Amount (PeriCalm):        Fluid Color (PeriCalm):         steroids: None     Antibiotics given for GBS: No     Induction: none     Indications for induction:        Augmentation:       Indications for augmentation:       Labor complications: None     Additional complications:          Cervical ripening:                     Delivery:      Episiotomy: None     Indication for Episiotomy:       Perineal Lacerations: None Repaired:      Periurethral Laceration:   Repaired:     Labial Laceration:   Repaired:     Sulcus Laceration:   Repaired:     Vaginal Laceration:   Repaired:     Cervical Laceration:   Repaired:     Repair suture:       Repair # of packets: 7     Last Value - EBL - Nursing (mL):       Sum - EBL - Nursing (mL): 0     Last Value - EBL - Anesthesia (mL):      Calculated QBL (mL): 470      Vaginal Sweep Performed: No     Surgicount Correct: Yes       Other providers:       Anesthesia    Method:  Spinal          Details (if applicable):  Trial of Labor No    Categorization: Repeat    Priority: Routine   Indications for : Repeat Section   Incision Type: low transverse     Additional  information:  Forceps:    Vacuum:    Breech:    Observed anomalies    Other (Comments):

## 2020-01-02 NOTE — TRANSFER OF CARE
"Anesthesia Transfer of Care Note    Patient: Radha Vanegas    Procedure(s) Performed: Procedure(s) (LRB):   SECTION, WITH TUBAL LIGATION (N/A)    Patient location: Labor and Delivery    Anesthesia Type: spinal    Transport from OR: Transported from OR on room air with adequate spontaneous ventilation    Post pain: adequate analgesia    Post assessment: no apparent anesthetic complications and tolerated procedure well    Post vital signs: stable    Level of consciousness: awake, alert and oriented    Nausea/Vomiting: no nausea/vomiting    Complications: none    Transfer of care protocol was followed      Last vitals:   Visit Vitals  BP (!) 142/86   Pulse 79   Temp 36.9 °C (98.4 °F)   Ht 5' 8" (1.727 m)   Wt 106.8 kg (235 lb 7.2 oz)   LMP 2019   SpO2 100%   Breastfeeding? No   BMI 35.80 kg/m²     "

## 2020-01-02 NOTE — SUBJECTIVE & OBJECTIVE
Obstetric HPI:  Patient reports None contractions, active fetal movement, No vaginal bleeding , No loss of fluid     This pregnancy has been complicated by History of  Section, Undesired Fertility, HCV (non-compliant with Hepatology follow up), History of Substance Abuse    OB History    Para Term  AB Living   5 3 3 0 1 3   SAB TAB Ectopic Multiple Live Births   0 1 0 0 3      # Outcome Date GA Lbr Brandon/2nd Weight Sex Delivery Anes PTL Lv   5 Current            4 Term 17 38w2d  2.81 kg (6 lb 3.1 oz) M CS-LTranv Spinal N MARCIANO      Name: KAYODE MTZ      Apgar1: 9  Apgar5: 9   3 Term 14 41w2d  4.321 kg (9 lb 8.4 oz) F CS-LTranv Spinal N MARCIANO      Apgar1: 9  Apgar5: 9   2 Term 13 39w5d  3.544 kg (7 lb 13 oz) M CS-Unspec   MARCIANO   1 TAB              Past Medical History:   Diagnosis Date    ADD (attention deficit disorder)     Anxiety     Depression     Hepatitis     Hep A,B,C    Hypertension     Migraine headache     Panic attack     Postpartum depression associated with first pregnancy     pt verbalized she was hospitalized     Past Surgical History:   Procedure Laterality Date    APPENDECTOMY       SECTION      x1       PTA Medications   Medication Sig    diphenoxylate-atropine 2.5-0.025 mg (LOMOTIL) 2.5-0.025 mg per tablet Take 1 tablet by mouth 4 (four) times daily as needed for Diarrhea. (Patient not taking: Reported on 2019)    ferrous sulfate (FEOSOL) 325 mg (65 mg iron) Tab tablet Take 1 tablet (325 mg total) by mouth once daily.    hydrOXYzine pamoate (VISTARIL) 25 MG Cap Take 1 capsule (25 mg total) by mouth 4 (four) times daily.    multivitamin capsule Take 1 capsule by mouth once daily.    ofloxacin (OCUFLOX) 0.3 % ophthalmic solution     ondansetron (ZOFRAN) 4 MG tablet Take 1 tablet (4 mg total) by mouth daily as needed for Nausea.    ondansetron (ZOFRAN-ODT) 8 MG TbDL Take 1 tablet (8 mg total) by mouth every 6 (six) hours as needed.     pantoprazole (PROTONIX) 20 MG tablet Take 1 tablet (20 mg total) by mouth once daily.    promethazine (PHENERGAN) 12.5 MG Tab Take 2 tablets (25 mg total) by mouth every 6 (six) hours as needed.       Review of patient's allergies indicates:  No Known Allergies     Family History     Problem Relation (Age of Onset)    Hypertension Mother        Tobacco Use    Smoking status: Current Some Day Smoker     Packs/day: 0.25     Types: Cigarettes    Smokeless tobacco: Never Used   Substance and Sexual Activity    Alcohol use: Yes     Comment: OCC    Drug use: Yes     Types: Marijuana     Comment: previous drug user (heroin, meth)    Sexual activity: Yes     Partners: Male     Birth control/protection: None     Review of Systems   Constitutional: Positive for fatigue. Negative for activity change, appetite change, chills, fever and unexpected weight change.   Respiratory: Negative for shortness of breath.    Cardiovascular: Negative for chest pain, palpitations and leg swelling.   Gastrointestinal: Negative for abdominal pain, bloating, blood in stool, constipation, diarrhea, nausea and vomiting.   Genitourinary: Negative for dysuria, flank pain, frequency, genital sores, hematuria, pelvic pain, urgency, vaginal bleeding, vaginal discharge, vaginal pain, urinary incontinence, vaginal dryness and vaginal odor.   Musculoskeletal: Positive for back pain.   Integumentary:  Negative for breast mass, nipple discharge, breast skin changes and breast tenderness.   Neurological: Negative for syncope and headaches.   Breast: Negative for asymmetry, lump, mass, mastodynia, nipple discharge, skin changes and tenderness     Objective:     Vital Signs (Most Recent):  Temp: 98.4 °F (36.9 °C) (01/02/20 0613)  Pulse: 79 (01/02/20 0613)  BP: (!) 142/86 (01/02/20 0613)  SpO2: 100 % (01/02/20 0613) Vital Signs (24h Range):  Temp:  [98.4 °F (36.9 °C)] 98.4 °F (36.9 °C)  Pulse:  [73-79] 79  SpO2:  [82 %-100 %] 100 %  BP:  (135-142)/(84-86) 142/86     Weight: 106.8 kg (235 lb 7.2 oz)  Body mass index is 35.8 kg/m².    FHT: Cat 1 (reassuring)  TOCO:      Physical Exam:   Constitutional: She is oriented to person, place, and time. She appears well-developed and well-nourished. No distress.    HENT:   Head: Normocephalic and atraumatic.    Eyes: Pupils are equal, round, and reactive to light. EOM are normal.    Neck: Normal range of motion.    Cardiovascular: Normal rate, regular rhythm and normal heart sounds.     Pulmonary/Chest: Effort normal and breath sounds normal.        Abdominal: Soft. Bowel sounds are normal. She exhibits no distension. There is no tenderness.             Musculoskeletal: Normal range of motion and moves all extremeties. She exhibits no edema or tenderness.       Neurological: She is alert and oriented to person, place, and time.    Skin: Skin is warm and dry.    Psychiatric: She has a normal mood and affect. Her behavior is normal. Thought content normal.       Significant Labs:  Lab Results   Component Value Date    GROUPTRH O POS 05/27/2019    HEPBSAG Negative 05/27/2019    HEPBSAG Negative 05/27/2019    STREPBCULT No Group B Streptococcus isolated 12/12/2019       I have personallly reviewed all pertinent lab results from the last 24 hours.

## 2020-01-02 NOTE — LACTATION NOTE
This note was copied from a baby's chart.  Discussed feeding choice with mother.  Reviewed benefits of breastfeeding and risks of formula feeding. Mother states her intention is to pump and breastfeed.    Discussed practices that support optimal maternity care and  feeding such as immediate skin to skin, the magic first hour, the importance of the first feeding, and delaying routine procedures. Also discussed continued skin to skin contact, rooming-in, and feeding on cue. Discussed feeding choice with mother. Reviewed benefits of breastfeeding and risks of formula feeding. Mother states her intention is to breastfeed    Discussed early feeding cues and encouraged mother to feed baby in response to those cues. Encouraged unrestricted feedings rather than timed/amount limits, procedural schedules, or visitation schedules. Reviewed normal feeding expectations of 8 or more feedings per 24 hour period, cues that babies use to signal hunger and satiety, and the importance of physical contact during feeding.

## 2020-01-02 NOTE — ANESTHESIA PROCEDURE NOTES
Spinal    Diagnosis: scheduled section  Patient location during procedure: OB  Start time: 1/2/2020 7:05 AM  Timeout: 1/2/2020 7:05 AM  End time: 1/2/2020 7:09 AM    Staffing  Authorizing Provider: Lisa Henson MD  Performing Provider: Earl Charlton CRNA    Preanesthetic Checklist  Completed: patient identified, site marked, surgical consent, pre-op evaluation, timeout performed, IV checked, risks and benefits discussed and monitors and equipment checked  Spinal Block  Patient position: sitting  Prep: Betadine  Patient monitoring: heart rate, continuous pulse ox, continuous capnometry and frequent blood pressure checks  Approach: midline  Location: L3-4  Injection technique: single shot  CSF Fluid: clear free-flowing CSF  Needle  Needle type: pencil-tip   Needle gauge: 25 G  Needle length: 3.5 in  Additional Documentation: incremental injection, negative aspiration for heme and no paresthesia on injection  Needle localization: anatomical landmarks  Assessment  Sensory level: T8   Dermatomal levels determined by pinch or prick  Ease of block: easy  Patient's tolerance of the procedure: comfortable throughout block and no complaints

## 2020-01-02 NOTE — LACTATION NOTE
"This note was copied from a baby's chart.  Lactation Rounds:     Visited mother at bedside. Discussed mother's goals for feeding.     Discussed maternal Hepatitis C positive status and implications for breastfeeding. Mother has a blister to right nipple and stated that she does not plan to latch baby to this breast any more.     Discussed maternal THC positive status. Mother stated that pediatrician recommended she not breastfeed if she plans to continue to use THC. Pediatrician's recommendations supported. Mother stated that she does not plan to use THC any more and that she used "a long time ago" and infrequently.     Mother stated that she would like her baby to receive colostrum, but that she does "not have the patience for latching him." During encounter, mother was attempting to latch baby to left breast and stated that she was getting frustrated. She asked for a bottle of formula, stating that she had visitors coming and that she did not want to "be dealing with breastfeeding" when they were present.    Mother stated that she enjoys the bonding opportunities that breastfeeding provides. Discussed option of hand expressing from left breast if mother does not wish to latch. Discussed making latch attempts when mother and baby are both relaxed and happy. Discussed other methods of bonding, including skin to skin contact. Mother stated that she plans to bottle feed formula at home, and that she may wish to continue to breastfeed, "depending on how it's going."     Discussed risks/benefits of feeding options. Discussed with mother preferred alternative feeding methods. Mother chooses to supplement infant via bottle.  Both parents taught how to safely feed infant via this method. Infant's father fed baby a bottle of formula. Mother appeared much more relaxed and happy. She was encouraged to call as needed with any questions or needs related to feeding her baby. She verbalized her understanding.  "

## 2020-01-02 NOTE — ANESTHESIA PREPROCEDURE EVALUATION
01/02/2020  Radha Vanegas is a 29 y.o., female.    Anesthesia Evaluation    I have reviewed the Patient Summary Reports.    I have reviewed the Nursing Notes.   I have reviewed the Medications.     Review of Systems  Anesthesia Hx:  No problems with previous Anesthesia  Neg history of prior surgery. Denies Family Hx of Anesthesia complications.   Denies Personal Hx of Anesthesia complications.   Social:  Non-Smoker, No Alcohol Use    Hematology/Oncology:  Hematology Normal   Oncology Normal     EENT/Dental:EENT/Dental Normal   Cardiovascular:  Cardiovascular Normal Exercise tolerance: good  Denies Pacemaker.  Denies Hypertension.  NYHA Classification I ECG has been reviewed.    Pulmonary:  Pulmonary Normal    Renal/:  Renal/ Normal     Hepatic/GI:   Liver Disease, Hepatitis, C    Musculoskeletal:  Musculoskeletal Normal    Neurological:   Headaches    Endocrine:  Endocrine Normal    Psych:   Psychiatric History anxiety depression          Physical Exam  General:  Well nourished    Airway/Jaw/Neck:  Airway Findings: Mouth Opening: Normal Tongue: Normal  Mallampati: I  Improves to II with phonation.  TM Distance: Normal, at least 6 cm     Eyes/Ears/Nose:  EYES/EARS/NOSE FINDINGS: Normal   Dental:  DENTAL FINDINGS: Normal   Chest/Lungs:  Chest/Lungs Findings: Clear to auscultation, Normal Respiratory Rate     Heart/Vascular:  Heart Findings: Rate: Normal  Rhythm: Regular Rhythm  Sounds: Normal     Abdomen:  Abdomen Findings:  Normal, Soft     Musculoskeletal:  Musculoskeletal Findings: Normal   Skin:  Skin Findings: Normal    Mental Status:  Mental Status Findings: Normal        Anesthesia Plan  Type of Anesthesia, risks & benefits discussed:  Anesthesia Type:  spinal  Patient's Preference:   Intra-op Monitoring Plan: standard ASA monitors  Intra-op Monitoring Plan Comments:   Post Op Pain Control  Plan: multimodal analgesia  Post Op Pain Control Plan Comments:   Induction:    Beta Blocker:  Patient is not currently on a Beta-Blocker (No further documentation required).       Informed Consent: Patient understands risks and agrees with Anesthesia plan.  Questions answered. Anesthesia consent signed with patient.  ASA Score: 2     Day of Surgery Review of History & Physical: I have interviewed and examined the patient. I have reviewed the patient's H&P dated:  There are no significant changes.          Ready For Surgery From Anesthesia Perspective.

## 2020-01-02 NOTE — OP NOTE
OPERATIVE NOTE    DATE:  2020    PRE-PROCEDURE COUNSELING:  Patient counseled on the risks, benefits, and alternatives to procedure.  Please see preoperative consents.   SCDs were applied and working prior to anesthesia induction.    PRE-OPERATIVE DIAGNOSIS:    1. IUP at 39w1d  2. History of C/S x 3 desires repeat  3. Desires permanent sterilization  4. Hepatitis C Infection  5. History of substance abuse  6. Smoker    POST-OPERATIVE DIAGNOSIS:   1. IUP at 39w1d  2. History of C/S x 3 desires repeat  3. Desires permanent sterilization  4. Hepatitis C Infection  5. History of substance abuse  6. Smoker  7. Midline incisional hernia    ANESTHESIA: Spinal Anesthesia    PROCEDURE:  Repeat Low Transverse  Section with Bilateral Tubal Ligation ([place])    SURGEON: Abram Ng MD    ASSISTANT: KRISTYN Ayers (presence necessary for procedure)    FINDINGS:  Single live male fetus in cephalic presentation.  APGAR 9/9 Weight 8 lb 0 oz.  Normal uterus, tubes, ovaries. Midline incisional hernia measuring 4 cm x 2 cm with omentum contents.  Loose nuchal cord x 1    SPECIMEN:  Bilateral tubal segments    EBL:  470 mL (QBL)    COMPLICATIONS:  None    IMPLANTS:  None    PROCEDURE IN DETAIL:   The patient was seen in the Holding Room. The risks, benefits, complications, treatment options, and expected outcomes were discussed with the patient incl alt to BTL and risk prenancy and ectopic pregnancy.  The patient concurred with the proposed plan, giving informed consent.  The patient was taken to Operating Room, identified as Radha Vanegas and the procedure verified as  Delivery. A Time Out was held and the above information confirmed.    Spinal anesthesia was administered, adequate level confirmed, and preoperative antibiotics administered.  The patient was draped and prepped in the usual sterile fashion.  A Pfannenstiel skin incision was made along the old incisional scar and carried down  through the subcutaneous tissue to the fascia. Immediately upon skin incision, a midline incisional hernia was noted containing omentum.  This measured approximately 4 x 2 cm.  The omentum was gently dissected away from the fascia.  The fascial incision was then completed and extended transversely. The fascia was  from the underlying rectus muscles superiorly and inferiorly. The peritoneum was identified, tented up, and entered sharply.  This incision was then extended superiorly and inferiorly with good visualization of the underlying bowel and bladder.   The utero-vesical peritoneal reflection was incised transversely and the bladder flap was bluntly dissected from the lower uterine segment.  A low transverse uterine incision was made in the midline and extended laterally.  There was clear amniotic fluid noted. The male infant was delivered from vertex presentation without difficulty and with Apgar scores of 9/9.  Loose nuchal cord x 1 was reduced and the umbilical cord was doubly clamped and cut.  Cord blood was obtained. The placenta was removed intact and appeared normal.  The uterine incision was then approximated in a running locked fashion with 0-Chromic.   Bilateral fallopian tubes were identified and grasped in the mid-isthmic portion.  A fenestration was made in the mesosalpinx and the tubes ligated and transected in the [place] fashion.  A 2-3 cm segment of tube was transected bilaterally.  Adequate hemostasis was noted from all pedicles.  Uterus was returned to the abdomen.  The abdomen and pelvis were irrigated and hemostasis noted.  The rectus muscles were then loosely approximated at the midline with 2-0 Chromic.  The fascia was then approximated in a running fashion with 0-Looped PDS.  Wound was irrigated and hemostasis noted.  Subcutaneous fat layer was approximated with 2-0 Plain Gut in a running fashion. The skin was approximated with 4-0 Monocryl in a running subcuticular fashion and a  silver abdominal dressing applied.  Instrument, sponge, and needle counts were correct prior the abdominal closure and at the conclusion of the case.     DISPOSITION: To recovery PP room           CONDITION: Stable

## 2020-01-02 NOTE — ANESTHESIA POSTPROCEDURE EVALUATION
Anesthesia Post Evaluation    Patient: Radha Vanegas    Procedure(s) Performed: Procedure(s) (LRB):   SECTION, WITH TUBAL LIGATION (N/A)    Final Anesthesia Type: spinal    Patient location during evaluation: labor & delivery  Patient participation: Yes- Able to Participate  Level of consciousness: awake and alert and oriented  Post-procedure vital signs: reviewed and stable  Pain management: adequate  Airway patency: patent  [place] mitigation strategies: Use of major conduction anesthesia (spinal/epidural) or peripheral nerve block  PONV status at discharge: No PONV  Anesthetic complications: no      Cardiovascular status: hemodynamically stable  Respiratory status: spontaneous ventilation and room air  Hydration status: euvolemic  Follow-up not needed.          Vitals Value Taken Time   /90 2020  6:50 AM   Temp 36.9 °C (98.4 °F) 2020  6:13 AM   Pulse 80 2020  6:55 AM   Resp 16 2020  8:32 AM   SpO2 100 % 2020  6:55 AM   Vitals shown include unvalidated device data.      No case tracking events are documented in the log.      Pain/Deng Score: No data recorded

## 2020-01-02 NOTE — HPI
30 yo  with IUP at 39w1d.  Pt is here for scheduled delivery and permanent sterilization.  Pt reports no complaints and is ready for surgery..

## 2020-01-03 PROCEDURE — 11000001 HC ACUTE MED/SURG PRIVATE ROOM

## 2020-01-03 PROCEDURE — 63600175 PHARM REV CODE 636 W HCPCS: Performed by: OBSTETRICS & GYNECOLOGY

## 2020-01-03 PROCEDURE — 25000003 PHARM REV CODE 250: Performed by: OBSTETRICS & GYNECOLOGY

## 2020-01-03 PROCEDURE — 25000003 PHARM REV CODE 250: Performed by: PHYSICIAN ASSISTANT

## 2020-01-03 RX ORDER — SIMETHICONE 80 MG
2 TABLET,CHEWABLE ORAL EVERY 6 HOURS PRN
Status: DISCONTINUED | OUTPATIENT
Start: 2020-01-03 | End: 2020-01-04 | Stop reason: HOSPADM

## 2020-01-03 RX ADMIN — DIPHENHYDRAMINE HYDROCHLORIDE 25 MG: 25 CAPSULE ORAL at 04:01

## 2020-01-03 RX ADMIN — HYDROCODONE BITARTRATE AND ACETAMINOPHEN 1 TABLET: 10; 325 TABLET ORAL at 04:01

## 2020-01-03 RX ADMIN — IBUPROFEN 800 MG: 800 TABLET, FILM COATED ORAL at 03:01

## 2020-01-03 RX ADMIN — PRENATAL VIT W/ FE FUMARATE-FA TAB 27-0.8 MG 1 TABLET: 27-0.8 TAB at 08:01

## 2020-01-03 RX ADMIN — HYDROCODONE BITARTRATE AND ACETAMINOPHEN 1 TABLET: 10; 325 TABLET ORAL at 09:01

## 2020-01-03 RX ADMIN — HYDROCODONE BITARTRATE AND ACETAMINOPHEN 1 TABLET: 10; 325 TABLET ORAL at 07:01

## 2020-01-03 RX ADMIN — CHLORHEXIDINE GLUCONATE 0.12% ORAL RINSE 10 ML: 1.2 LIQUID ORAL at 08:01

## 2020-01-03 RX ADMIN — PANTOPRAZOLE SODIUM 40 MG: 40 TABLET, DELAYED RELEASE ORAL at 08:01

## 2020-01-03 RX ADMIN — HYDROCODONE BITARTRATE AND ACETAMINOPHEN 1 TABLET: 10; 325 TABLET ORAL at 02:01

## 2020-01-03 RX ADMIN — HYDROCODONE BITARTRATE AND ACETAMINOPHEN 1 TABLET: 10; 325 TABLET ORAL at 11:01

## 2020-01-03 RX ADMIN — KETOROLAC TROMETHAMINE 30 MG: 30 INJECTION, SOLUTION INTRAMUSCULAR; INTRAVENOUS at 08:01

## 2020-01-03 RX ADMIN — SIMETHICONE CHEW TAB 80 MG 160 MG: 80 TABLET ORAL at 03:01

## 2020-01-03 RX ADMIN — DOCUSATE SODIUM 100 MG: 100 CAPSULE, LIQUID FILLED ORAL at 08:01

## 2020-01-03 RX ADMIN — SIMETHICONE CHEW TAB 80 MG 160 MG: 80 TABLET ORAL at 11:01

## 2020-01-03 RX ADMIN — IBUPROFEN 800 MG: 800 TABLET, FILM COATED ORAL at 10:01

## 2020-01-03 NOTE — PLAN OF CARE
Bonding well with baby boy. Complains of frequent cramping. Pain is controlled with oral pain medications. Patient states that splinting and positioning with pillows helps to minimize discomfort. Ambulates in room without complications and voids spontaneously. VSS. Will continue to monitor.

## 2020-01-03 NOTE — NURSING
Patient is stable. Patient has stable VS. Pain has been controlled with scheduled and PO pain medication. Lochia is light. Will continue to monitor.

## 2020-01-03 NOTE — PHYSICIAN QUERY
"PT Name: Radha Vanegas  MR #: [id number]     Physician Query Form - Documentation Clarification      CDS/: LINDA Lee,RNC-MNN       Contact information:[email or web address]    This form is a permanent document in the medical record.     Query Date: January 3, 2020    By submitting this query, we are merely seeking further clarification of documentation. Please utilize your independent clinical judgment when addressing the question(s) below.    The Medical record reflects the following:    Supporting Clinical Findings Location in Medical Record   H/O drug abuse  Sober for past 3 yrs.  UDS on admit    Marijuana (THC) Metabolite  Value: Presumptive Positive    Discussed maternal THC positive status. Mother stated that pediatrician recommended she not breastfeed if she plans to continue to use THC. Pediatrician's recommendations supported. Mother stated that she does not plan to use THC any more and that she used "a long time ago" and infrequently H&P 1/2      LAB 1/2      Lactation note 1/2                                                                                      Doctor, Please specify diagnosis or diagnoses associated with above clinical findings.    Provider Use Only      [ X ] Drug use   [  ] Drug abuse  [  ] Past medical history only of drug use/abuse  [  ] Other, please specify:___________________________________________                                                                                                                   [  ] Clinically Undetermined               "

## 2020-01-03 NOTE — PLAN OF CARE
Copied from baby's chart:    SOCIAL WORK DISCHARGE PLANNING ASSESSMENT     Sw completed discharge planning assessment with pt's parents in mother's room 425.  Pt's parents were easily engaged. Education on the role of  was provided. Emotional support provided throughout assessment.     Mom's utox resulted + for THC and baby's utox resulted - for THC. Meconium is pending. A report maybe made to [facility] through the [facility] hotline [phone] pending on meconium results. An online report would be filed as well.     Mom reported THC use a total of 3 times during pregnancy because of nausea. Mom reported last use 2-3 weeks ago. Mom has 4 other children in the home. Mom reported that she had not used THC in the past. Mom reported THC use began during this pregnancy. Mom also stated she is planning on not using in the future.         Legal Name: Satnam Hyman          :  2020         Patient Active Problem List   Diagnosis    Single liveborn infant, delivered by     Intrauterine drug exposure     hepatitis C exposure            [facility]                  Birth Weight:   3.63 kg (8 lb)                           Gestational Age: 39w1d           Apgars    Living status:  Living  Apgars:   1 min.:   5 min.:   10 min.:   15 min.:   20 min.:     Skin color:   1  1          Heart rate:   2  2          Reflex irritability:   2  2          Muscle tone:   2  2          Respiratory effort:   2  2          Total:   9  9          Apgars assigned by:  BRAN BECK/ PRAVEEN GILMAN RN            Mother: Radha Vanegas  Address: [address] [address]  Phone: [phone]        Father: Kwasi Hyman  Address: Same as above     Signed Birth Certificate: Yes; couple are .     Support person(s): Kwasi Hyman     Sibling(s): 5 children all together; ages 8, 6, 5 and 2.        Pediatrician: [facility]       Nutrition: combination of expressed breast milk and formula                Breast Pump:              No                             WIC:              Mom not certified; however will apply for         Essential Items: (includes car seat, crib/bassinet/pack-n-play, clothing, bottles, diapers, etc.)  Acquired      Transportation: Personal vehicle            Potential Discharge Needs:  None      MORAIMA Serna, CSW     [facility]     Phone: [phone]     [email or web address]

## 2020-01-03 NOTE — PROGRESS NOTES
[facility] -   Obstetrics  Postpartum Progress Note    Patient Name: Radha Vanegas  MRN: [id number]  Admission Date: 2020  Hospital Length of Stay: 1 days  Attending Physician: Abram Ng MD  Primary Care Provider: Anika Johnston MD    Subjective:     Principal Problem:Status post repeat low transverse  section    Hospital course: DATE:  2020  History of C/S x 3 desires repeat  Desires permanent sterilization  Midline incisional hernia  Transferred to mother baby unit for routine post partum care. Patient progressed well postoperatively without complication.           Interval History:     She is doing well this morning. She is tolerating a regular diet without nausea or vomiting. She is voiding spontaneously. She is ambulating. She has not passed flatus, and has not a BM. Vaginal bleeding is mild. She denies fever or chills. Abdominal pain is mild and controlled with oral medications. She is breastfeeding.     Objective:     Vital Signs (Most Recent):  Temp: 98.4 °F (36.9 °C) (20 0800)  Pulse: 73 (20 0800)  Resp: 18 (20 0800)  BP: 135/75 (20 0800)  SpO2: 100 % (20 1120) Vital Signs (24h Range):  Temp:  [98.1 °F (36.7 °C)-98.9 °F (37.2 °C)] 98.4 °F (36.9 °C)  Pulse:  [72-91] 73  Resp:  [18-20] 18  SpO2:  [100 %] 100 %  BP: (117-141)/(65-90) 135/75     Weight: 106.8 kg (235 lb 7.2 oz)  Body mass index is 35.8 kg/m².      Intake/Output Summary (Last 24 hours) at 1/3/2020 1109  Last data filed at 1/3/2020 0130  Gross per 24 hour   Intake --   Output 2400 ml   Net -2400 ml       Significant Labs:  Lab Results   Component Value Date    GROUPTRH O POS 2020    HEPBSAG Negative 2019    HEPBSAG Negative 2019    STREPBCULT No Group B Streptococcus isolated 2019     Recent Labs   Lab 20  0530   HGB 10.8*   HCT 33.3*       I have personallly reviewed all pertinent lab results from the last 24 hours.    Physical Exam:    Constitutional: She is oriented to person, place, and time. She appears well-developed and well-nourished. No distress.       Cardiovascular: Normal rate, regular rhythm, normal heart sounds and intact distal pulses.    No murmur heard.   Pulmonary/Chest: Effort normal and breath sounds normal. No respiratory distress. She has no wheezes. She has no rales.        Abdominal: Soft. Bowel sounds are normal. She exhibits abdominal incision (Aquacel dressing in place, clear/dry/intact). She exhibits no distension. There is no tenderness. There is no guarding.   Uterine fundus firm, below umbilicus, mild tenderness (appropriate)             Musculoskeletal: Moves all extremeties. She exhibits edema (trace BLE).   No calf tenderness       Neurological: She is alert and oriented to person, place, and time.    Skin: Skin is warm and dry. No rash noted. She is not diaphoretic.        Assessment/Plan:     29 y.o. female  for:    * Status post repeat low transverse  section  POD #1 s/p repeat LTCS with tubal ligation  Pt doing well, afebrile overnight.  Proceed with routine post-partum care, encouraged ambulation, aware ok to shower.  Pt counseled on management plan and discharge goals. Anticipate discharge in 1-2 days            Hepatitis C  Non-compliant with Hepatology follow up.    H/O drug abuse  Sober for past 3 yrs.  UDS on admit.        Disposition: As patient meets milestones, will plan to discharge.    Danielle Nick PA-C  Obstetrics  [facility] -

## 2020-01-03 NOTE — SUBJECTIVE & OBJECTIVE
Hospital course: DATE:  01/02/2020  History of C/S x 3 desires repeat  Desires permanent sterilization  Midline incisional hernia  Transferred to mother baby unit for routine post partum care. Patient progressed well postoperatively without complication.           Interval History:     She is doing well this morning. She is tolerating a regular diet without nausea or vomiting. She is voiding spontaneously. She is ambulating. She has not passed flatus, and has not a BM. Vaginal bleeding is mild. She denies fever or chills. Abdominal pain is mild and controlled with oral medications. She is breastfeeding.     Objective:     Vital Signs (Most Recent):  Temp: 98.4 °F (36.9 °C) (01/03/20 0800)  Pulse: 73 (01/03/20 0800)  Resp: 18 (01/03/20 0800)  BP: 135/75 (01/03/20 0800)  SpO2: 100 % (01/02/20 1120) Vital Signs (24h Range):  Temp:  [98.1 °F (36.7 °C)-98.9 °F (37.2 °C)] 98.4 °F (36.9 °C)  Pulse:  [72-91] 73  Resp:  [18-20] 18  SpO2:  [100 %] 100 %  BP: (117-141)/(65-90) 135/75     Weight: 106.8 kg (235 lb 7.2 oz)  Body mass index is 35.8 kg/m².      Intake/Output Summary (Last 24 hours) at 1/3/2020 1109  Last data filed at 1/3/2020 0130  Gross per 24 hour   Intake --   Output 2400 ml   Net -2400 ml       Significant Labs:  Lab Results   Component Value Date    GROUPTRH O POS 01/02/2020    HEPBSAG Negative 05/27/2019    HEPBSAG Negative 05/27/2019    STREPBCULT No Group B Streptococcus isolated 12/12/2019     Recent Labs   Lab 01/02/20  0530   HGB 10.8*   HCT 33.3*       I have personallly reviewed all pertinent lab results from the last 24 hours.    Physical Exam:   Constitutional: She is oriented to person, place, and time. She appears well-developed and well-nourished. No distress.       Cardiovascular: Normal rate, regular rhythm, normal heart sounds and intact distal pulses.    No murmur heard.   Pulmonary/Chest: Effort normal and breath sounds normal. No respiratory distress. She has no wheezes. She has no rales.         Abdominal: Soft. Bowel sounds are normal. She exhibits abdominal incision (Aquacel dressing in place, clear/dry/intact). She exhibits no distension. There is no tenderness. There is no guarding.   Uterine fundus firm, below umbilicus, mild tenderness (appropriate)             Musculoskeletal: Moves all extremeties. She exhibits edema (trace BLE).   No calf tenderness       Neurological: She is alert and oriented to person, place, and time.    Skin: Skin is warm and dry. No rash noted. She is not diaphoretic.

## 2020-01-03 NOTE — LACTATION NOTE
"Lactation rounds:     Infant displaying feeding cues upon entering room. Mom put baby to left breast in football hold. Initially attempted to latch infant without any supports in place. Pillows provided, positioning improved. Mom latched infant independently with ease. Infant latched well with nutritive suck and audible swallows observed. Some stimulation required to keep infant feeding however infant was dressed and had blanked loosely wrapped. Encouraged mom to undress or at least remove blanket from infant to keep awake during feeding. Reports significant uterine cramping with feeding. Discussed voiding prior to latch.     Right nipple has what appears to be an old scab that is about the diameter of a ballpoint pen tip. Mom is feeding infant exclusively from left breast at this time and is aware to assess nipple before and after feeds for breakdown. Infant received formula supplementation last night due to maternal discomfort and infant hunger.     Mom reports that her plan is to provide "a little" breastmilk to baby and then switch to exclusive formula bottles. Mom states that she is "not a patient person" and doesn't want to breastfeed long. Supported mom's feeding plan and encouraged her to do what she feels comfortable with.   "

## 2020-01-03 NOTE — PHYSICIAN QUERY
PT Name: Radha Vanegas  MR #: [id number]     Physician Query Form - Documentation Clarification      CDS/: LINDA Lee,RNC-[email or web address]        Contact information:[email or web address]    This form is a permanent document in the medical record.     Query Date: January 3, 2020    By submitting this query, we are merely seeking further clarification of documentation. Please utilize your independent clinical judgment when addressing the question(s) below.    The Medical record reflects the following:    Supporting Clinical Findings Location in Medical Record   Past Medical History  Hypertension    Denies Hypertension    UL=007/90, 138/90, 138/96, 132/90 H&P 1/2      Anesthesia note 1/2    Flowsheet 1/2                                                                                      Doctor, Please specify diagnosis or diagnoses associated with above clinical findings.    Provider Use Only      [  ] Past medical history only of hypertension  [ X ] Pre-existing hypertension  [  ] Other, please specify:_______________________________________                                                                                                                   [  ] Clinically Undetermined

## 2020-01-03 NOTE — HOSPITAL COURSE
DATE:  01/02/2020  History of C/S x 3 desires repeat  Desires permanent sterilization  Midline incisional hernia  Transferred to mother baby unit for routine post partum care. Patient progressed well postoperatively without complication.

## 2020-01-03 NOTE — ASSESSMENT & PLAN NOTE
Admit for scheduled delivery.  Procedure details, indications, risks, benefits, and alternatives were reviewed with pt.  Pt voiced understanding and desires to proceed with C/S with BTL.  Hospital consents were reviewed with pt and signed.  Pre-operative instructions were given.  
Non-compliant with Hepatology follow up.  
POD #1 s/p repeat LTCS with tubal ligation  Pt doing well, afebrile overnight.  Proceed with routine post-partum care, encouraged ambulation, aware ok to shower.  Pt counseled on management plan and discharge goals. Anticipate discharge in 1-2 days          
Sober for past 3 yrs.  UDS on admit.  
None

## 2020-01-04 VITALS
BODY MASS INDEX: 35.68 KG/M2 | HEIGHT: 68 IN | HEART RATE: 83 BPM | DIASTOLIC BLOOD PRESSURE: 81 MMHG | WEIGHT: 235.44 LBS | RESPIRATION RATE: 18 BRPM | OXYGEN SATURATION: 100 % | SYSTOLIC BLOOD PRESSURE: 145 MMHG | TEMPERATURE: 98 F

## 2020-01-04 PROCEDURE — 63600175 PHARM REV CODE 636 W HCPCS: Performed by: OBSTETRICS & GYNECOLOGY

## 2020-01-04 PROCEDURE — 25000003 PHARM REV CODE 250: Performed by: OBSTETRICS & GYNECOLOGY

## 2020-01-04 PROCEDURE — 90670 PCV13 VACCINE IM: CPT | Performed by: OBSTETRICS & GYNECOLOGY

## 2020-01-04 PROCEDURE — 90471 IMMUNIZATION ADMIN: CPT | Performed by: OBSTETRICS & GYNECOLOGY

## 2020-01-04 PROCEDURE — 25000003 PHARM REV CODE 250: Performed by: PHYSICIAN ASSISTANT

## 2020-01-04 RX ORDER — OXYCODONE AND ACETAMINOPHEN 5; 325 MG/1; MG/1
1 TABLET ORAL EVERY 4 HOURS PRN
Status: DISCONTINUED | OUTPATIENT
Start: 2020-01-04 | End: 2020-01-04 | Stop reason: HOSPADM

## 2020-01-04 RX ORDER — IBUPROFEN 800 MG/1
800 TABLET ORAL EVERY 8 HOURS
Qty: 30 TABLET | Refills: 1 | Status: SHIPPED | OUTPATIENT
Start: 2020-01-04 | End: 2020-06-26

## 2020-01-04 RX ORDER — OXYCODONE AND ACETAMINOPHEN 5; 325 MG/1; MG/1
1 TABLET ORAL EVERY 4 HOURS PRN
Qty: 20 TABLET | Refills: 0 | Status: SHIPPED | OUTPATIENT
Start: 2020-01-04 | End: 2020-06-26

## 2020-01-04 RX ORDER — OXYCODONE AND ACETAMINOPHEN 10; 325 MG/1; MG/1
1 TABLET ORAL EVERY 4 HOURS PRN
Status: DISCONTINUED | OUTPATIENT
Start: 2020-01-04 | End: 2020-01-04 | Stop reason: HOSPADM

## 2020-01-04 RX ADMIN — PANTOPRAZOLE SODIUM 40 MG: 40 TABLET, DELAYED RELEASE ORAL at 08:01

## 2020-01-04 RX ADMIN — PNEUMOCOCCAL 13-VALENT CONJUGATE VACCINE 0.5 ML: 2.2; 2.2; 2.2; 2.2; 2.2; 4.4; 2.2; 2.2; 2.2; 2.2; 2.2; 2.2; 2.2 INJECTION, SUSPENSION INTRAMUSCULAR at 11:01

## 2020-01-04 RX ADMIN — CHLORHEXIDINE GLUCONATE 0.12% ORAL RINSE 10 ML: 1.2 LIQUID ORAL at 08:01

## 2020-01-04 RX ADMIN — IBUPROFEN 800 MG: 800 TABLET, FILM COATED ORAL at 05:01

## 2020-01-04 RX ADMIN — HYDROCODONE BITARTRATE AND ACETAMINOPHEN 1 TABLET: 10; 325 TABLET ORAL at 04:01

## 2020-01-04 RX ADMIN — SENNOSIDES, DOCUSATE SODIUM 1 TABLET: 50; 8.6 TABLET, FILM COATED ORAL at 08:01

## 2020-01-04 RX ADMIN — DOCUSATE SODIUM 100 MG: 100 CAPSULE, LIQUID FILLED ORAL at 08:01

## 2020-01-04 RX ADMIN — SIMETHICONE CHEW TAB 80 MG 160 MG: 80 TABLET ORAL at 08:01

## 2020-01-04 RX ADMIN — HYDROCODONE BITARTRATE AND ACETAMINOPHEN 1 TABLET: 10; 325 TABLET ORAL at 08:01

## 2020-01-04 NOTE — LACTATION NOTE
"Lactation Rounds:    Weight loss -5.5 % 3 voids and 2 stools documented in the last 24 hours. Mother reports she is breast and bottle feeding.     Because baby is being supplemented away from the breast, mother was:   - informed that breastfeeding support and assistance is available as needed  - encouraged to express milk from both breasts each time a supplement is given  - encouraged to use her own collected milk as a first choice for supplementation  Mother was encouraged to request assistance as needed and voices understanding.    Due to +hep C mother instructed if nipples are bleeding or blistered not to give EBM or breastfeed on effected side and call lactation on warmline for further guidance. Mother verbalized understanding.    Mother reports taking benadryl at night, discussed safety hazard, risk for low milk supply, and informed pediatrician. Benadryl is L2 medication.     According to Yon Gannon 17th edition of Medications & Mothers Milk, marijuana exposure to the infant, via breast milk, is considered possibly hazardous. "Both human and animal studies suggest that early exposure to cannabis may not be benign and that cannabis exposure in the  period may produce long-term changes in the mental and motor development. While this data poses numerous limitations, and noes not directly examine the benefits of breast milk versus the risk of exposure to marijuana in milk, cannabis use in breastfeeding mothers should be strongly discouraged at this time," (Melly 2017). This information was discussed at length with the mother, mother verbalizes understanding. The mother chose to breast and bottle feed the infant.       Breastfeeding discharge education performed. Informed mother of the World Health Organization's recommendation for exclusive breastfeeding for the first 6 months of baby's life and continued breastfeeding after the introduction of solid foods for 2 years and beyond. Also informed mother of " the American Academy of Pediatrics' recommendation for baby to be examined by pediatrician or other qualified HCP within 2-4 dys of discharge and again at the 2nd week of life. Discussed baby's appropriate intake and output, adequate weight gain patterns for baby, and how to seek the assistance of a qualified healthcare professional for concerns related to  feeding. Written instructions have been provided and were reviewed at this time. Mother voices understanding.    Mother denies any further lactation needs or concerns at this time. Mother anticipates discharge home today. Mother is aware of warm line and outpatient consultations. Encouraged mother to contact lactation with any questions, concerns, or problems. Contact numbers provided, and mother verbalizes understanding.

## 2020-01-04 NOTE — NURSING
DC HOME WITH BABY , MOM SAYS TAKE BENADRYL AT NIGHT , LACTATION NURSE SPOKE WITH PT TELLING HER  REGULAR USE OF BENADRYL MY DRY UP THE MILK SUPPLY .  CAREFUL WITH USE .   STRESSED S/S OF PREECLAMPSIA TO PT AND  SINCE PRIOR PREGNANCY HAD PREECLAMPSIA AND HA 1 WEEK AFTER THE DELIVERY .  MOM VERBALIZED UNDERSTANDING

## 2020-01-04 NOTE — NURSING
Patient is stable. Patient has stable VS. Pain has been controlled with scheduled PO pain medication. Lochia is light. Will continue to monitor.

## 2020-01-04 NOTE — DISCHARGE INSTRUCTIONS
"Mother Self Care:    Activity: Avoid strenuous exercise and get adequate rest.  No driving until the physician consent given.  Emotional Changes: Most women find birth to be a time of great emotional upheaval.  Sense of loss, mood swings, fatigue, anxiety, and feeling "let down" are common.  If feelings worsen or last more than a week, call your physician.  Breast Care/Breastfeeding: Wear a bra for comfort.  Keep nipples dry and apply your own breast milk or lanolin cream as needed for soreness.  Engorgement can be relieved with warm, moist heat before feedings.  You may also take Ibuprofen.  Breast Care/Bottle Feeding: Wear support bra 24 hours a day for one week.  Avoid stimulation to breasts.  You may use ice packs for discomfort.  Petr-Care/Vaginal Bleeding: Remember to use your petr-bottle after urinating.  Your flow will change from red, to pink, to yellow/white color over a period of 2 weeks.  Menstruation will return in 3-8 weeks, or longer if breastfeeding.       Section/Tubal Ligation: Keep incision clean and dry. You may shower, but avoid baths.  Sexual Activity/Pelvic Rest: No sexual activity, tampons, or douching until your physician gives you consent.  Diet: Continue to eat from the five basic food groups, including plenty of protein, fruits, vegetables, and whole grains.  Limit empty calories and high fat foods.  Drink enough fluids to satisfy thirst and add an extra 500 calories for breastfeeding.  Constipation/Hemorrhoids: Drink plenty of water.  You may take a stool softener or natural laxative (Metamucil). You may use tucks or hemorrhoid ointment and soak in a warm tub.    CALL YOUR OB DOCTOR IF ANY OF THE FOLLOWING OCCURS:  *Heavy bleeding - saturating a pad an hour or passing any large (2-3 inches in size) blood clots.  *Any pain, redness, or tenderness in lower leg.  *You cannot care for yourself or your baby.  *Any signs of infection-      - Temperature greater than 100.5 degrees F     "  - Foul smelling vaginal discharge and/or incisional drainage      - Increased episiotomy or incisional pain      - Hot, hard, red or sore area on breast      - Flu-like symptoms      - Any urgency, frequency or burning with urination    Return To the Hospital for further Evaluation:  · Headache not relieved by tylenol or ibuprofen  · Blurry vision, double vision, seeing spots, or flashing lights  · Feeling faint or passing out  · Right epigastric pain  · Difficulty breathing  · Swelling in hands, face, or feet  · Any of these symptoms accompanied by nausea/vomiting  · Gaining more than 5 pounds in one week  · Seizures  These symptoms could be an indication of elevated blood pressure.       If you have any questions that need to be answered immediately please call the Labor & Delivery Unit at [phone] and ask to speak to a nurse.

## 2020-01-07 NOTE — DISCHARGE SUMMARY
[facility] -   Obstetrics  Discharge Summary      Patient Name: Radha Vanegas  MRN: [id number]  Admission Date: 2020  Hospital Length of Stay: 2 days  Discharge Date and Time: 2020 12:13 PM  Attending Physician: No att. providers found   Discharging Provider: Vera Dorado MD   Primary Care Provider: Anika Johnston MD    HPI: 30 yo  with IUP at 39w1d.  Pt is here for scheduled delivery and permanent sterilization.  Pt reports no complaints and is ready for surgery..        Procedure(s) (LRB):   SECTION, WITH TUBAL LIGATION (N/A)     Hospital Course:   DATE:  2020  History of C/S x 3 desires repeat  Desires permanent sterilization  Midline incisional hernia  Transferred to mother baby unit for routine post partum care. Patient progressed well postoperatively without complication.                Final Active Diagnoses:    Diagnosis Date Noted POA    PRINCIPAL PROBLEM:  Status post repeat low transverse  section [Z98.891] 2017 Not Applicable    Status post tubal ligation at time of delivery, current hospitalization [O80, Z30.2] 2020 Not Applicable    Non-compliant pregnant patient, third trimester [O09.893, Z91.19] 2019 Not Applicable    Hypertension affecting pregnancy in third trimester [O16.3] 2019 Yes    Smoker [F17.200] 2019 Yes    Hepatitis C [B19.20] 2017 Yes    H/O drug abuse [F19.11] 2016 Yes      Problems Resolved During this Admission:        Labs: All labs within the past 24 hours have been reviewed      Immunizations     Date Immunization Status Dose Route/Site Given by    20 1006 MMR Deferred 0.5 mL Subcutaneous/Left deltoid Amanda Javier RN    20 1006 Tdap Deferred 0.5 mL Intramuscular/Left deltoid Amanda Javier RN    20 1159 Pneumococcal Conjugate - 13 Valent Deleted 0.5 mL Intramuscular/Left deltoid     20 1157 Pneumococcal Conjugate - 13 Valent Given 0.5 mL  Intramuscular/Left deltoid Amanda Javier RN          Delivery:    Episiotomy: None   Lacerations: None   Repair suture:     Repair # of packets: 7   Blood loss (ml):       Birth information:  YOB: 2022   Time of birth: 7:39 AM   Sex: male   Delivery type: , Low Transverse   Gestational Age: 39w1d    Delivery Clinician:      Other providers:       Additional  information:  Forceps:    Vacuum:    Breech:    Observed anomalies      Living?:           APGARS  One minute Five minutes Ten minutes   Skin color:         Heart rate:         Grimace:         Muscle tone:         Breathing:         Totals: 9  9        Placenta: Delivered:       appearance    Pending Diagnostic Studies:     Procedure Component Value Units Date/Time    Specimen to Pathology, Surgery Gynecology and Obstetrics [[id number]] Collected:  20 0750    Order Status:  Sent Lab Status:  In process Updated:  20 1058          Discharged Condition: stable    Disposition: Home or Self Care    Follow Up:  Follow-up Information     Abram Ng MD In 4 weeks.    Specialty:  Obstetrics and Gynecology  Why:  Post partum  Contact information:  [address]  [place] [address] [phone]             [facility] In 1 week.    Why:  For dressing removal and BP check               Patient Instructions:      Other restrictions (specify):   Order Comments: Pelvic rest x 6 weeks     No driving until:   Order Comments: Two weeks post surgery.     Call MD for:  temperature >100.4     Call MD for:  persistent nausea and vomiting or diarrhea     Call MD for:  severe uncontrolled pain     Call MD for:  redness, tenderness, or signs of infection (pain, swelling, redness, odor or green/yellow discharge around incision site)     Call MD for:  difficulty breathing or increased cough     Call MD for:  severe persistent headache     Call MD for:  persistent dizziness, light-headedness, or visual disturbances     Leave dressing on  - Keep it clean, dry, and intact until clinic visit     Medications:  Discharge Medication List as of 1/4/2020 10:09 AM      START taking these medications    Details   ibuprofen (ADVIL,MOTRIN) 800 MG tablet Take 1 tablet (800 mg total) by mouth every 8 (eight) hours., Starting Sat 1/4/2020, Normal      oxyCODONE-acetaminophen (PERCOCET) 5-325 mg per tablet Take 1 tablet by mouth every 4 (four) hours as needed for Pain., Starting Sat 1/4/2020, Normal         CONTINUE these medications which have NOT CHANGED    Details   ferrous sulfate (FEOSOL) 325 mg (65 mg iron) Tab tablet Take 1 tablet (325 mg total) by mouth once daily., Starting Tue 11/26/2019, Until Wed 11/25/2020, Normal      multivitamin capsule Take 1 capsule by mouth once daily., Historical Med         STOP taking these medications       diphenoxylate-atropine 2.5-0.025 mg (LOMOTIL) 2.5-0.025 mg per tablet Comments:   Reason for Stopping:         hydrOXYzine pamoate (VISTARIL) 25 MG Cap Comments:   Reason for Stopping:         ofloxacin (OCUFLOX) 0.3 % ophthalmic solution Comments:   Reason for Stopping:         ondansetron (ZOFRAN) 4 MG tablet Comments:   Reason for Stopping:         ondansetron (ZOFRAN-ODT) 8 MG TbDL Comments:   Reason for Stopping:         pantoprazole (PROTONIX) 20 MG tablet Comments:   Reason for Stopping:         promethazine (PHENERGAN) 12.5 MG Tab Comments:   Reason for Stopping:               Vera Dorado MD  Obstetrics [facility] -

## 2020-01-08 LAB
FINAL PATHOLOGIC DIAGNOSIS: NORMAL
GROSS: NORMAL

## 2020-01-09 NOTE — PROGRESS NOTES
Subjective:       Patient ID: Radha Vanegas is a 29 y.o. female.    Chief Complaint:  No chief complaint on file.      History of Present Illness  HPI  Postoperative Follow-up  Patient presents to the clinic 1 weeks status post  and bilateral tubal ligation for requested sterilization and repeat . Eating a regular diet without difficulty. Bowel movements are normal.   Reports severe HA since being home following delivery with pain along the back of her head near the spine. Denies vision changes.    GYN & OB History  Patient's last menstrual period was 2019.   Date of Last Pap: 2017    OB History    Para Term  AB Living   5 4 4   1 4   SAB TAB Ectopic Multiple Live Births     1   0 4      # Outcome Date GA Lbr Brandon/2nd Weight Sex Delivery Anes PTL Lv   5 Term 20 39w1d  3.63 kg (8 lb) M CS-LTranv Spinal N MARCIANO   4 Term 17 38w2d  2.81 kg (6 lb 3.1 oz) M CS-LTranv Spinal N MARCIANO   3 Term 14 41w2d  4.321 kg (9 lb 8.4 oz) F CS-LTranv Spinal N MARCIANO   2 Term 13 39w5d  3.544 kg (7 lb 13 oz) M CS-Unspec   MARCIANO   1 TAB                Review of Systems  Review of Systems   Constitutional: Negative for activity change, chills, fatigue and fever.   Eyes: Negative for visual disturbance.   Respiratory: Negative for cough.    Cardiovascular: Negative for chest pain and leg swelling.   Gastrointestinal: Negative for abdominal pain, constipation, nausea and vomiting.   Genitourinary: Negative for dysuria, frequency, hematuria, pelvic pain, urgency, vaginal bleeding and vaginal discharge.   Integumentary:  Negative for rash.   Neurological: Positive for headaches.           Objective:    Physical Exam:   Constitutional: She is oriented to person, place, and time. She appears well-developed and well-nourished. No distress.       Cardiovascular: Normal rate, regular rhythm, normal heart sounds and intact distal pulses.    No murmur heard.   Pulmonary/Chest: Effort  normal and breath sounds normal. No respiratory distress. She has no wheezes. She has no rales.        Abdominal: Soft. Bowel sounds are normal. She exhibits abdominal incision (Aquacel dressing in place, clear/dry/intact; dressing removed, incision is healing well without erythema/induration/drainage). She exhibits no distension. There is no tenderness. There is no guarding.             Musculoskeletal: Moves all extremeties. She exhibits no edema.   No calf tenderness       Neurological: She is alert and oriented to person, place, and time.   No clonus, normal reflexes    Skin: Skin is warm and dry. No rash noted. She is not diaphoretic.           Problem List Items Addressed This Visit        Obstetric    Status post repeat low transverse  section - Primary    Hypertension affecting pregnancy in third trimester      Other Visit Diagnoses     Elevated blood pressure complicating pregnancy, antepartum, unspecified trimester          Elevated BP with 2 checks, has significant HA, needs preE eval will send to L&D      Patient is scheduled for postpartum visit on  with Dr Ng.    Reviewed all restrictions & limitations with the patient. Advised to call clinic in event of fever, redness or drainage around the incision, worsening pain, or any any concerning issues or symptoms.  Instructed the importance of showering daily, no tub baths, using an antibacterial soap.  Patient verbalized understanding.

## 2020-01-10 ENCOUNTER — HOSPITAL ENCOUNTER (OUTPATIENT)
Facility: HOSPITAL | Age: 30
Discharge: HOME | End: 2020-01-10
Attending: OBSTETRICS & GYNECOLOGY | Admitting: OBSTETRICS & GYNECOLOGY
Payer: MEDICAID

## 2020-01-10 ENCOUNTER — OFFICE VISIT (OUTPATIENT)
Dept: OBSTETRICS AND GYNECOLOGY | Facility: CLINIC | Age: 30
End: 2020-01-10
Payer: MEDICAID

## 2020-01-10 VITALS
WEIGHT: 218.5 LBS | BODY MASS INDEX: 33.22 KG/M2 | SYSTOLIC BLOOD PRESSURE: 165 MMHG | DIASTOLIC BLOOD PRESSURE: 108 MMHG

## 2020-01-10 VITALS — SYSTOLIC BLOOD PRESSURE: 137 MMHG | DIASTOLIC BLOOD PRESSURE: 91 MMHG | HEART RATE: 64 BPM

## 2020-01-10 DIAGNOSIS — R03.0 ELEVATED BLOOD PRESSURE READING: ICD-10-CM

## 2020-01-10 DIAGNOSIS — Z98.891 STATUS POST REPEAT LOW TRANSVERSE CESAREAN SECTION: Primary | ICD-10-CM

## 2020-01-10 DIAGNOSIS — O16.3 HYPERTENSION AFFECTING PREGNANCY IN THIRD TRIMESTER: ICD-10-CM

## 2020-01-10 DIAGNOSIS — O16.9 ELEVATED BLOOD PRESSURE COMPLICATING PREGNANCY, ANTEPARTUM, UNSPECIFIED TRIMESTER: ICD-10-CM

## 2020-01-10 LAB
ALBUMIN SERPL BCP-MCNC: 3 G/DL (ref 3.5–5.2)
ALP SERPL-CCNC: 79 U/L (ref 55–135)
ALT SERPL W/O P-5'-P-CCNC: 20 U/L (ref 10–44)
ANION GAP SERPL CALC-SCNC: 10 MMOL/L (ref 8–16)
AST SERPL-CCNC: 17 U/L (ref 10–40)
BASOPHILS # BLD AUTO: 0.02 K/UL (ref 0–0.2)
BASOPHILS NFR BLD: 0.3 % (ref 0–1.9)
BILIRUB SERPL-MCNC: 0.3 MG/DL (ref 0.1–1)
BUN SERPL-MCNC: 12 MG/DL (ref 6–20)
CALCIUM SERPL-MCNC: 8.8 MG/DL (ref 8.7–10.5)
CHLORIDE SERPL-SCNC: 109 MMOL/L (ref 95–110)
CO2 SERPL-SCNC: 22 MMOL/L (ref 23–29)
CREAT SERPL-MCNC: 0.7 MG/DL (ref 0.5–1.4)
CREAT UR-MCNC: 21.2 MG/DL (ref 15–325)
DIFFERENTIAL METHOD: ABNORMAL
EOSINOPHIL # BLD AUTO: 0.1 K/UL (ref 0–0.5)
EOSINOPHIL NFR BLD: 1.5 % (ref 0–8)
ERYTHROCYTE [DISTWIDTH] IN BLOOD BY AUTOMATED COUNT: 13.1 % (ref 11.5–14.5)
EST. GFR  (AFRICAN AMERICAN): >60 ML/MIN/1.73 M^2
EST. GFR  (NON AFRICAN AMERICAN): >60 ML/MIN/1.73 M^2
GLUCOSE SERPL-MCNC: 87 MG/DL (ref 70–110)
HCT VFR BLD AUTO: 33 % (ref 37–48.5)
HGB BLD-MCNC: 10.5 G/DL (ref 12–16)
IMM GRANULOCYTES # BLD AUTO: 0.05 K/UL (ref 0–0.04)
IMM GRANULOCYTES NFR BLD AUTO: 0.7 % (ref 0–0.5)
LYMPHOCYTES # BLD AUTO: 1.5 K/UL (ref 1–4.8)
LYMPHOCYTES NFR BLD: 22.1 % (ref 18–48)
MCH RBC QN AUTO: 29.8 PG (ref 27–31)
MCHC RBC AUTO-ENTMCNC: 31.8 G/DL (ref 32–36)
MCV RBC AUTO: 94 FL (ref 82–98)
MONOCYTES # BLD AUTO: 0.4 K/UL (ref 0.3–1)
MONOCYTES NFR BLD: 5.2 % (ref 4–15)
NEUTROPHILS # BLD AUTO: 4.8 K/UL (ref 1.8–7.7)
NEUTROPHILS NFR BLD: 70.2 % (ref 38–73)
NRBC BLD-RTO: 0 /100 WBC
PLATELET # BLD AUTO: 306 K/UL (ref 150–350)
PMV BLD AUTO: 10.4 FL (ref 9.2–12.9)
POTASSIUM SERPL-SCNC: 4.5 MMOL/L (ref 3.5–5.1)
PROT SERPL-MCNC: 7 G/DL (ref 6–8.4)
PROT UR-MCNC: <7 MG/DL (ref 0–15)
PROT/CREAT UR: NORMAL MG/G{CREAT} (ref 0–0.2)
RBC # BLD AUTO: 3.52 M/UL (ref 4–5.4)
SODIUM SERPL-SCNC: 141 MMOL/L (ref 136–145)
WBC # BLD AUTO: 6.89 K/UL (ref 3.9–12.7)

## 2020-01-10 PROCEDURE — 99211 OFF/OP EST MAY X REQ PHY/QHP: CPT | Mod: 27

## 2020-01-10 PROCEDURE — 99212 OFFICE O/P EST SF 10 MIN: CPT | Mod: PBBFAC,TH

## 2020-01-10 PROCEDURE — 99499 NO LOS: ICD-10-PCS | Mod: TH,S$PBB,, | Performed by: OBSTETRICS & GYNECOLOGY

## 2020-01-10 PROCEDURE — 99213 PR OFFICE/OUTPT VISIT, EST, LEVL III, 20-29 MIN: ICD-10-PCS | Mod: ,,, | Performed by: OBSTETRICS & GYNECOLOGY

## 2020-01-10 PROCEDURE — 99999 PR PBB SHADOW E&M-EST. PATIENT-LVL II: ICD-10-PCS | Mod: PBBFAC,,,

## 2020-01-10 PROCEDURE — 25000003 PHARM REV CODE 250: Performed by: OBSTETRICS & GYNECOLOGY

## 2020-01-10 PROCEDURE — 80053 COMPREHEN METABOLIC PANEL: CPT

## 2020-01-10 PROCEDURE — 85025 COMPLETE CBC W/AUTO DIFF WBC: CPT

## 2020-01-10 PROCEDURE — 99499 UNLISTED E&M SERVICE: CPT | Mod: TH,S$PBB,, | Performed by: OBSTETRICS & GYNECOLOGY

## 2020-01-10 PROCEDURE — 99999 PR PBB SHADOW E&M-EST. PATIENT-LVL II: CPT | Mod: PBBFAC,,,

## 2020-01-10 PROCEDURE — 84156 ASSAY OF PROTEIN URINE: CPT

## 2020-01-10 PROCEDURE — 99213 OFFICE O/P EST LOW 20 MIN: CPT | Mod: ,,, | Performed by: OBSTETRICS & GYNECOLOGY

## 2020-01-10 RX ORDER — ONDANSETRON 8 MG/1
8 TABLET, ORALLY DISINTEGRATING ORAL EVERY 8 HOURS PRN
Status: DISCONTINUED | OUTPATIENT
Start: 2020-01-10 | End: 2020-01-10 | Stop reason: HOSPADM

## 2020-01-10 RX ORDER — HYDROXYZINE PAMOATE 25 MG/1
25 CAPSULE ORAL 3 TIMES DAILY PRN
Status: DISCONTINUED | OUTPATIENT
Start: 2020-01-10 | End: 2020-01-10 | Stop reason: HOSPADM

## 2020-01-10 RX ORDER — SODIUM CHLORIDE 9 MG/ML
INJECTION, SOLUTION INTRAVENOUS CONTINUOUS
Status: DISCONTINUED | OUTPATIENT
Start: 2020-01-10 | End: 2020-01-10 | Stop reason: HOSPADM

## 2020-01-10 RX ORDER — HYDROXYZINE PAMOATE 25 MG/1
25 CAPSULE ORAL 3 TIMES DAILY PRN
Qty: 30 CAPSULE | Refills: 1 | Status: SHIPPED | OUTPATIENT
Start: 2020-01-10 | End: 2020-06-26

## 2020-01-10 RX ORDER — BUTALBITAL, ACETAMINOPHEN AND CAFFEINE 50; 325; 40 MG/1; MG/1; MG/1
1 TABLET ORAL EVERY 4 HOURS PRN
Status: DISCONTINUED | OUTPATIENT
Start: 2020-01-10 | End: 2020-01-10 | Stop reason: HOSPADM

## 2020-01-10 RX ORDER — ACETAMINOPHEN 500 MG
500 TABLET ORAL EVERY 6 HOURS PRN
Status: DISCONTINUED | OUTPATIENT
Start: 2020-01-10 | End: 2020-01-10 | Stop reason: HOSPADM

## 2020-01-10 RX ORDER — BUTALBITAL, ACETAMINOPHEN AND CAFFEINE 50; 325; 40 MG/1; MG/1; MG/1
1 TABLET ORAL EVERY 4 HOURS PRN
Qty: 30 TABLET | Refills: 1 | Status: SHIPPED | OUTPATIENT
Start: 2020-01-10 | End: 2020-01-20

## 2020-01-10 RX ORDER — NIFEDIPINE 30 MG/1
30 TABLET, EXTENDED RELEASE ORAL DAILY
Status: DISCONTINUED | OUTPATIENT
Start: 2020-01-10 | End: 2020-01-10 | Stop reason: HOSPADM

## 2020-01-10 RX ORDER — NIFEDIPINE 30 MG/1
30 TABLET, EXTENDED RELEASE ORAL DAILY
Qty: 30 TABLET | Refills: 2 | Status: SHIPPED | OUTPATIENT
Start: 2020-01-11

## 2020-01-10 RX ADMIN — NIFEDIPINE 30 MG: 30 TABLET, FILM COATED, EXTENDED RELEASE ORAL at 01:01

## 2020-01-10 RX ADMIN — BUTALBITAL, ACETAMINOPHEN, AND CAFFEINE 1 TABLET: 50; 325; 40 TABLET ORAL at 01:01

## 2020-01-10 NOTE — ASSESSMENT & PLAN NOTE
Serial BP's 90% mild elevated /normal range, with 2 pressures elevated > 140/90.  Pre-e labs normal. PCR unable to calculate/low range.  Will administer Fioricet now for headache, start on Procardia 30 mg xl and cont. QD.  RX to be delivered bedside.  Patient to follow up in office on Monday for BP check.   Pre-E precautions continue.

## 2020-01-10 NOTE — DISCHARGE INSTRUCTIONS

## 2020-01-10 NOTE — SUBJECTIVE & OBJECTIVE
OB History    Para Term  AB Living   5 4 4 0 1 4   SAB TAB Ectopic Multiple Live Births   0 1 0 0 4      # Outcome Date GA Lbr Brandon/2nd Weight Sex Delivery Anes PTL Lv   5 Term 20 39w1d  3.63 kg (8 lb) M CS-LTranv Spinal N MARCIANO      Name: KAYODE MTZ      Apgar1: 9  Apgar5: 9   4 Term 17 38w2d  2.81 kg (6 lb 3.1 oz) M CS-LTranv Spinal N MARCIANO      Name: KAYODE MTZ      Apgar1: 9  Apgar5: 9   3 Term 14 41w2d  4.321 kg (9 lb 8.4 oz) F CS-LTranv Spinal N MARCIANO      Apgar1: 9  Apgar5: 9   2 Term 13 39w5d  3.544 kg (7 lb 13 oz) M CS-Unspec   MARCIANO   1 TAB              Past Medical History:   Diagnosis Date    ADD (attention deficit disorder)     Anxiety     Depression     Hepatitis     Hep A,B,C    Hypertension     Migraine headache     Panic attack     Postpartum depression associated with first pregnancy     pt verbalized she was hospitalized     Past Surgical History:   Procedure Laterality Date    APPENDECTOMY       SECTION      x1     SECTION WITH TUBAL LIGATION N/A 2020    Procedure:  SECTION, WITH TUBAL LIGATION;  Surgeon: Abram Ng MD;  Location: [facility] [place] L&D;  Service: OB/GYN;  Laterality: N/A;       PTA Medications   Medication Sig    ferrous sulfate (FEOSOL) 325 mg (65 mg iron) Tab tablet Take 1 tablet (325 mg total) by mouth once daily. (Patient not taking: Reported on 1/10/2020)    ibuprofen (ADVIL,MOTRIN) 800 MG tablet Take 1 tablet (800 mg total) by mouth every 8 (eight) hours.    multivitamin capsule Take 1 capsule by mouth once daily.    oxyCODONE-acetaminophen (PERCOCET) 5-325 mg per tablet Take 1 tablet by mouth every 4 (four) hours as needed for Pain. (Patient not taking: Reported on 1/10/2020)       Review of patient's allergies indicates:  No Known Allergies     Family History     Problem Relation (Age of Onset)    Hypertension Mother        Tobacco Use    Smoking status: Current Some Day Smoker     Packs/day:  0.25     Types: Cigarettes    Smokeless tobacco: Never Used   Substance and Sexual Activity    Alcohol use: Yes     Comment: OCC    Drug use: Yes     Types: Marijuana     Comment: previous drug user (heroin, meth)    Sexual activity: Yes     Partners: Male     Birth control/protection: None     Review of Systems   Constitutional: Negative for fever.   Eyes: Negative for visual disturbance.   Respiratory: Negative for shortness of breath.    Cardiovascular: Negative for chest pain.   Gastrointestinal: Positive for abdominal pain (mild post op incisional discomfort). Negative for nausea and vomiting.   Genitourinary: Positive for vaginal bleeding (normal lochia, no odor.).   Integumentary:  Positive for hair changes.   Neurological: Positive for headaches (start in mornings and stay dull all day.  no ruq pain. ).      Objective:     Vital Signs (Most Recent):  Pulse: (!) 59 (01/10/20 1218)  BP: 132/89 (01/10/20 1218) Vital Signs (24h Range):  Pulse:  [59-82] 59  BP: (128-165)/() 132/89        There is no height or weight on file to calculate BMI.    Patient's last menstrual period was 04/03/2019.    Physical Exam:   Constitutional: She is oriented to person, place, and time. She appears well-developed and well-nourished.    HENT:   Head: Normocephalic.      Cardiovascular: Normal rate, regular rhythm and normal heart sounds.     Pulmonary/Chest: Effort normal and breath sounds normal.        Abdominal: Soft.     Genitourinary: Uterus normal. Vaginal discharge (normal lochia w/o odor. ) found.   Genitourinary Comments: FF @ u -2  Abdominal incision intact, well approximated, no redness or drainage.            Musculoskeletal: Normal range of motion and moves all extremeties. She exhibits edema (1+ bilateral lower extremities).       Neurological: She is alert and oriented to person, place, and time. She has normal reflexes.    Skin: Skin is warm and dry.    Psychiatric: She has a normal mood and affect.        Laboratory:  CBC:   Recent Labs   Lab 01/10/20  1140   WBC 6.89   RBC 3.52*   HGB 10.5*   HCT 33.0*      MCV 94   MCH 29.8   MCHC 31.8*     CMP:   Recent Labs   Lab 01/10/20  1140   GLU 87   CALCIUM 8.8   ALBUMIN 3.0*   PROT 7.0      K 4.5   CO2 22*      BUN 12   CREATININE 0.7   ALKPHOS 79   ALT 20   AST 17   BILITOT 0.3     I have personallly reviewed all pertinent lab results from the last 24 hours.    Diagnostic Results:  Labs: Reviewed  All normal.

## 2020-01-10 NOTE — H&P
[facility]  Obstetrics & Gynecology  History & Physical    Patient Name: Radha Vanegas  MRN: [id number]  Admission Date: 1/10/2020  Primary Care Provider: Anika Johnston MD    Subjective:     Chief Complaint/Reason for Admission: headache with elevated home BP readings.     History of Present Illness:  , RCS POD # 8 , am headaches with elevated BP. Sent from office.           OB History    Para Term  AB Living   5 4 4 0 1 4   SAB TAB Ectopic Multiple Live Births   0 1 0 0 4      # Outcome Date GA Lbr Brandon/2nd Weight Sex Delivery Anes PTL Lv   5 Term 20 39w1d  3.63 kg (8 lb) M CS-LTranv Spinal N MARCIANO      Name: KAYODE VANEGAS      Apgar1: 9  Apgar5: 9   4 Term 17 38w2d  2.81 kg (6 lb 3.1 oz) M CS-LTranv Spinal N MARCIANO      Name: KAYODE VANEGAS      Apgar1: 9  Apgar5: 9   3 Term 14 41w2d  4.321 kg (9 lb 8.4 oz) F CS-LTranv Spinal N MARCIANO      Apgar1: 9  Apgar5: 9   2 Term 13 39w5d  3.544 kg (7 lb 13 oz) M CS-Unspec   MARCIANO   1 TAB              Past Medical History:   Diagnosis Date    ADD (attention deficit disorder)     Anxiety     Depression     Hepatitis     Hep A,B,C    Hypertension     Migraine headache     Panic attack     Postpartum depression associated with first pregnancy     pt verbalized she was hospitalized     Past Surgical History:   Procedure Laterality Date    APPENDECTOMY       SECTION      x1     SECTION WITH TUBAL LIGATION N/A 2020    Procedure:  SECTION, WITH TUBAL LIGATION;  Surgeon: Abram Ng MD;  Location: [facility] L&D;  Service: OB/GYN;  Laterality: N/A;       PTA Medications   Medication Sig    ferrous sulfate (FEOSOL) 325 mg (65 mg iron) Tab tablet Take 1 tablet (325 mg total) by mouth once daily. (Patient not taking: Reported on 1/10/2020)    ibuprofen (ADVIL,MOTRIN) 800 MG tablet Take 1 tablet (800 mg total) by mouth every 8 (eight) hours.    multivitamin capsule Take 1 capsule by mouth once  daily.    oxyCODONE-acetaminophen (PERCOCET) 5-325 mg per tablet Take 1 tablet by mouth every 4 (four) hours as needed for Pain. (Patient not taking: Reported on 1/10/2020)       Review of patient's allergies indicates:  No Known Allergies     Family History     Problem Relation (Age of Onset)    Hypertension Mother        Tobacco Use    Smoking status: Current Some Day Smoker     Packs/day: 0.25     Types: Cigarettes    Smokeless tobacco: Never Used   Substance and Sexual Activity    Alcohol use: Yes     Comment: OCC    Drug use: Yes     Types: Marijuana     Comment: previous drug user (heroin, meth)    Sexual activity: Yes     Partners: Male     Birth control/protection: None     Review of Systems   Constitutional: Negative for fever.   Eyes: Negative for visual disturbance.   Respiratory: Negative for shortness of breath.    Cardiovascular: Negative for chest pain.   Gastrointestinal: Positive for abdominal pain (mild post op incisional discomfort). Negative for nausea and vomiting.   Genitourinary: Positive for vaginal bleeding (normal lochia, no odor.).   Integumentary:  Positive for hair changes.   Neurological: Positive for headaches (start in mornings and stay dull all day.  no ruq pain. ).      Objective:     Vital Signs (Most Recent):  Pulse: (!) 59 (01/10/20 1218)  BP: 132/89 (01/10/20 1218) Vital Signs (24h Range):  Pulse:  [59-82] 59  BP: (128-165)/() 132/89        There is no height or weight on file to calculate BMI.    Patient's last menstrual period was 04/03/2019.    Physical Exam:   Constitutional: She is oriented to person, place, and time. She appears well-developed and well-nourished.    HENT:   Head: Normocephalic.      Cardiovascular: Normal rate, regular rhythm and normal heart sounds.     Pulmonary/Chest: Effort normal and breath sounds normal.        Abdominal: Soft.     Genitourinary: Uterus normal. Vaginal discharge (normal lochia w/o odor. ) found.   Genitourinary Comments:  FF @ u -2  Abdominal incision intact, well approximated, no redness or drainage.            Musculoskeletal: Normal range of motion and moves all extremeties. She exhibits edema (1+ bilateral lower extremities).       Neurological: She is alert and oriented to person, place, and time. She has normal reflexes.    Skin: Skin is warm and dry.    Psychiatric: She has a normal mood and affect.       Laboratory:  CBC:   Recent Labs   Lab 01/10/20  1140   WBC 6.89   RBC 3.52*   HGB 10.5*   HCT 33.0*      MCV 94   MCH 29.8   MCHC 31.8*     CMP:   Recent Labs   Lab 01/10/20  1140   GLU 87   CALCIUM 8.8   ALBUMIN 3.0*   PROT 7.0      K 4.5   CO2 22*      BUN 12   CREATININE 0.7   ALKPHOS 79   ALT 20   AST 17   BILITOT 0.3     I have personallly reviewed all pertinent lab results from the last 24 hours.    Diagnostic Results:  Labs: Reviewed  All normal.     Assessment/Plan:     * Elevated blood pressure reading  Serial BP's 90% mild elevated /normal range, with 2 pressures elevated > 140/90.  Pre-e labs normal. PCR unable to calculate/low range.  Will administer Fioricet now for headache, start on Procardia 30 mg xl and cont. QD.  RX to be delivered bedside.  Patient to follow up in office on Monday for BP check.   Pre-E precautions continue.         Diana Gamble CNM  Obstetrics & Gynecology  [facility]

## 2020-01-10 NOTE — PROGRESS NOTES
Patient sitting on side of bed in room with clothes on. States she is ready to go, she is getting more and more anxious and the blood pressure machine kept going off and no one was coming to quiet it. Explained to patient that alarm is not cued at nurses desk and we do not hear it from the room so patient has to alert us on call bell when alarming. She states oohhh ok I thought it was just being ignored. Notified CNM of patient mentioning how anxious she is all the time and how antsy she always is. CNM to assess patient again.

## 2020-01-10 NOTE — DISCHARGE SUMMARY
[facility] -   Obstetrics & Gynecology  Discharge Summary    Patient Name: Radha Vanegas  MRN: [id number]  Admission Date: 1/10/2020  Hospital Length of Stay: 0 days  Discharge Date and Time:  01/10/2020 1:55 PM  Attending Physician: Vera Dorado, *   Discharging Provider: Diana Gamble CNM  Primary Care Provider: Anika Johnston MD    HPI:  , RCS POD # 8 , am headaches with elevated BP. Sent from office.       Hospital Course:  Serial BP's with one elevated BP and remainders upper limits normal.  Pre-E lab work up normal.    Abdominal incision well approximated and w/o redness/drainage.  Will give Fioricet now for headache and begin Procardia 30 mg XL po now and then continue qd.   Follow up in office on Monday for BP check.   Will send RX to on campus pharmacy for delivery here.     * No surgery found *         Significant Diagnostic Studies: Labs: All labs within the past 24 hours have been reviewed    Pending Diagnostic Studies:     None        Final Active Diagnoses:    Diagnosis Date Noted POA    PRINCIPAL PROBLEM:  Elevated blood pressure reading [R03.0] 01/10/2020 Yes      Problems Resolved During this Admission:        Discharged Condition: good    Disposition:     Follow Up:    Patient Instructions:   No discharge procedures on file.  Medications:  Reconciled Home Medications:      Medication List      START taking these medications    butalbital-acetaminophen-caffeine -40 mg -40 mg per tablet  Commonly known as:  FIORICET, ESGIC  Take 1 tablet by mouth every 4 (four) hours as needed for Headaches (not relieved by plain acetaminophen).     hydrOXYzine pamoate 25 MG Cap  Commonly known as:  VISTARIL  Take 1 capsule (25 mg total) by mouth 3 (three) times daily as needed.     NIFEdipine 30 MG (OSM) 24 hr tablet  Commonly known as:  PROCARDIA-XL  Take 1 tablet (30 mg total) by mouth once daily.  Start taking on:  2020        CONTINUE taking these  medications    ferrous sulfate 325 mg (65 mg iron) Tab tablet  Commonly known as:  FEOSOL  Take 1 tablet (325 mg total) by mouth once daily.     ibuprofen 800 MG tablet  Commonly known as:  ADVIL,MOTRIN  Take 1 tablet (800 mg total) by mouth every 8 (eight) hours.     oxyCODONE-acetaminophen 5-325 mg per tablet  Commonly known as:  PERCOCET  Take 1 tablet by mouth every 4 (four) hours as needed for Pain.        ASK your doctor about these medications    multivitamin capsule  Take 1 capsule by mouth once daily.            Diana Gamble CNM  Obstetrics & Gynecology  [facility]

## 2020-01-10 NOTE — PROGRESS NOTES
Patient able to recall s/s HTN to return to Dr. Garcia. Prescriptions received from hospital outpt. Pharmacy.

## 2020-01-10 NOTE — HOSPITAL COURSE
Serial BP's with one elevated BP and remainders upper limits normal.  Pre-E lab work up normal.    Abdominal incision well approximated and w/o redness/drainage.  Will give Fioricet now for headache and begin Procardia 30 mg XL po now and then continue qd.   Follow up in office on Monday for BP check.   Will send RX to on campus pharmacy for delivery here.

## 2020-04-06 PROBLEM — Z30.2 STATUS POST TUBAL LIGATION AT TIME OF DELIVERY, CURRENT HOSPITALIZATION: Status: RESOLVED | Noted: 2020-01-02 | Resolved: 2020-04-06

## 2020-04-29 ENCOUNTER — PATIENT MESSAGE (OUTPATIENT)
Dept: GASTROENTEROLOGY | Facility: CLINIC | Age: 30
End: 2020-04-29

## 2020-05-22 ENCOUNTER — PATIENT MESSAGE (OUTPATIENT)
Dept: OBSTETRICS AND GYNECOLOGY | Facility: CLINIC | Age: 30
End: 2020-05-22

## 2020-05-22 RX ORDER — NIFEDIPINE 30 MG/1
TABLET, FILM COATED, EXTENDED RELEASE ORAL
Qty: 10 TABLET | Refills: 0 | Status: SHIPPED | OUTPATIENT
Start: 2020-05-22 | End: 2020-06-26

## 2020-06-26 ENCOUNTER — OFFICE VISIT (OUTPATIENT)
Dept: OBSTETRICS AND GYNECOLOGY | Facility: CLINIC | Age: 30
End: 2020-06-26
Payer: MEDICAID

## 2020-06-26 VITALS
DIASTOLIC BLOOD PRESSURE: 88 MMHG | BODY MASS INDEX: 32.78 KG/M2 | WEIGHT: 215.63 LBS | SYSTOLIC BLOOD PRESSURE: 142 MMHG

## 2020-06-26 DIAGNOSIS — Z11.3 SCREEN FOR STD (SEXUALLY TRANSMITTED DISEASE): ICD-10-CM

## 2020-06-26 DIAGNOSIS — N89.8 VAGINAL DISCHARGE: Primary | ICD-10-CM

## 2020-06-26 PROBLEM — O09.299 HISTORY OF POSTPARTUM HEMORRHAGE, CURRENTLY PREGNANT: Status: RESOLVED | Noted: 2019-05-27 | Resolved: 2020-06-26

## 2020-06-26 PROBLEM — Z91.199 NON-COMPLIANT PREGNANT PATIENT, THIRD TRIMESTER: Status: RESOLVED | Noted: 2019-12-24 | Resolved: 2020-06-26

## 2020-06-26 PROBLEM — O09.93 SUPERVISION OF HIGH RISK PREGNANCY IN THIRD TRIMESTER: Status: RESOLVED | Noted: 2019-07-25 | Resolved: 2020-06-26

## 2020-06-26 PROBLEM — O99.891 HISTORY OF POSTPARTUM DEPRESSION, CURRENTLY PREGNANT: Status: RESOLVED | Noted: 2019-05-27 | Resolved: 2020-06-26

## 2020-06-26 PROBLEM — O09.299 HISTORY OF PRE-ECLAMPSIA IN PRIOR PREGNANCY, CURRENTLY PREGNANT: Status: RESOLVED | Noted: 2019-08-22 | Resolved: 2020-06-26

## 2020-06-26 PROBLEM — Z86.59 HISTORY OF POSTPARTUM DEPRESSION, CURRENTLY PREGNANT: Status: RESOLVED | Noted: 2019-05-27 | Resolved: 2020-06-26

## 2020-06-26 PROBLEM — R03.0 ELEVATED BLOOD PRESSURE READING: Status: RESOLVED | Noted: 2020-01-10 | Resolved: 2020-06-26

## 2020-06-26 PROBLEM — Z98.891 STATUS POST REPEAT LOW TRANSVERSE CESAREAN SECTION: Status: RESOLVED | Noted: 2017-03-09 | Resolved: 2020-06-26

## 2020-06-26 PROBLEM — O16.3 HYPERTENSION AFFECTING PREGNANCY IN THIRD TRIMESTER: Status: RESOLVED | Noted: 2019-12-12 | Resolved: 2020-06-26

## 2020-06-26 PROBLEM — O09.893 NON-COMPLIANT PREGNANT PATIENT, THIRD TRIMESTER: Status: RESOLVED | Noted: 2019-12-24 | Resolved: 2020-06-26

## 2020-06-26 PROCEDURE — 99212 OFFICE O/P EST SF 10 MIN: CPT | Mod: PBBFAC | Performed by: NURSE PRACTITIONER

## 2020-06-26 PROCEDURE — 99213 PR OFFICE/OUTPT VISIT, EST, LEVL III, 20-29 MIN: ICD-10-PCS | Mod: S$PBB,,, | Performed by: NURSE PRACTITIONER

## 2020-06-26 PROCEDURE — 99213 OFFICE O/P EST LOW 20 MIN: CPT | Mod: S$PBB,,, | Performed by: NURSE PRACTITIONER

## 2020-06-26 PROCEDURE — 99999 PR PBB SHADOW E&M-EST. PATIENT-LVL II: CPT | Mod: PBBFAC,,, | Performed by: NURSE PRACTITIONER

## 2020-06-26 PROCEDURE — 99999 PR PBB SHADOW E&M-EST. PATIENT-LVL II: ICD-10-PCS | Mod: PBBFAC,,, | Performed by: NURSE PRACTITIONER

## 2020-06-26 PROCEDURE — 87491 CHLMYD TRACH DNA AMP PROBE: CPT

## 2020-06-26 NOTE — PROGRESS NOTES
CC: Vaginal discharge with odor     Radha Vanegas is a 30 y.o. female  presents with complaint of vaginal discharge with odor times 2 months    Past Medical History:   Diagnosis Date    ADD (attention deficit disorder)     Anxiety     Depression     Hepatitis     Hep A,B,C    Hypertension     Migraine headache     Panic attack     Postpartum depression associated with first pregnancy     pt verbalized she was hospitalized     Past Surgical History:   Procedure Laterality Date    APPENDECTOMY       SECTION      x1     SECTION WITH TUBAL LIGATION N/A 2020    Procedure:  SECTION, WITH TUBAL LIGATION;  Surgeon: Abram Ng MD;  Location: [facility] L&D;  Service: OB/GYN;  Laterality: N/A;     Social History     Tobacco Use    Smoking status: Current Some Day Smoker     Packs/day: 0.25     Types: Cigarettes    Smokeless tobacco: Never Used   Substance Use Topics    Alcohol use: Yes     Comment: OCC    Drug use: Yes     Types: Marijuana     Comment: previous drug user (heroin, meth)     Family History   Problem Relation Age of Onset    Hypertension Mother     Breast cancer Neg Hx     Ovarian cancer Neg Hx     Deep vein thrombosis Neg Hx     Stomach cancer Neg Hx     Liver disease Neg Hx     Colon cancer Neg Hx      OB History    Para Term  AB Living   5 4 4   1 4   SAB TAB Ectopic Multiple Live Births     1   0 4      # Outcome Date GA Lbr Brandon/2nd Weight Sex Delivery Anes PTL Lv   5 Term 20 39w1d  3.63 kg (8 lb) M CS-LTranv Spinal N MARCIANO   4 Term 17 38w2d  2.81 kg (6 lb 3.1 oz) M CS-LTranv Spinal N MARCIANO   3 Term 14 41w2d  4.321 kg (9 lb 8.4 oz) F CS-LTranv Spinal N MARCIANO   2 Term 13 39w5d  3.544 kg (7 lb 13 oz) M CS-Unspec   MARCIANO   1 TAB                BP (!) 142/88   Wt 97.8 kg (215 lb 9.8 oz)   LMP 2020 (Approximate)   Breastfeeding No   BMI 32.78 kg/m²     ROS:  Review of Systems   Constitutional: Negative.    HENT:  Negative.    Eyes: Negative.    Respiratory: Negative.    Cardiovascular: Negative.    Gastrointestinal: Negative.    Endocrine: Negative.    Genitourinary: Positive for vaginal discharge and vaginal odor.   Musculoskeletal: Negative.    Integumentary:  Negative.   Neurological: Negative.    Hematological: Negative.    Psychiatric/Behavioral: Negative.    Breast: negative.         PHYSICAL EXAM:  Physical Exam  Constitutional:       Appearance: Normal appearance.   Genitourinary:      Pelvic exam was performed with patient supine.      Vulva, inguinal canal, urethra, bladder and rectum normal.      Genitourinary Comments: Removed tampon from vaginal vault    HENT:      Head: Normocephalic.      Nose: Nose normal.      Mouth/Throat:      Mouth: Mucous membranes are moist.   Eyes:      Pupils: Pupils are equal, round, and reactive to light.   Neck:      Musculoskeletal: Normal range of motion.   Pulmonary:      Effort: Pulmonary effort is normal.   Abdominal:      General: Abdomen is flat.      Palpations: Abdomen is soft.   Musculoskeletal: Normal range of motion.   Neurological:      Mental Status: She is alert and oriented to person, place, and time.   Skin:     General: Skin is warm and dry.   Psychiatric:         Mood and Affect: Mood normal.         Behavior: Behavior normal.         Thought Content: Thought content normal.         Judgment: Judgment normal.          ASSESSMENT and PLAN:    ICD-10-CM ICD-9-CM    1. Vaginal discharge  N89.8 623.5 Vaginosis Screen by DNA Probe   2. Screen for STD (sexually transmitted disease)  Z11.3 V74.5 C. trachomatis/N. gonorrhoeae by AMP DNA [facility]; Cervix     Vaginal discharge  -     Vaginosis Screen by DNA Probe    Screen for STD (sexually transmitted disease)  -     C. trachomatis/N. gonorrhoeae by AMP DNA [facility]; Cervix        Patient was counseled today on vaginitis prevention including :  a. avoiding feminine products such as deoderant soaps, body wash, bubble bath,  douches, scented toilet paper, deoderant tampons or pads, feminine wipes, chronic pad use, etc.  b. avoiding other vulvovaginal irritants such as long hot baths, humidity, tight, synthetic clothing, chlorine and sitting around in wet bathing suits  c. wearing cotton underwear, avoiding thong underwear and no underwear to bed  d. taking showers instead of baths and use a hair dryer on cool setting afterwards to dry  e. wearing cotton to exercise and shower immediately after exercise and change clothes  f. using polyurethane condoms without spermicide if sexually active and symptoms are triggered by intercourse  Removed tampon from vaginal vault, other treatment pending vaginosis and gc/ct screen

## 2020-06-29 LAB
C TRACH DNA SPEC QL NAA+PROBE: NOT DETECTED
N GONORRHOEA DNA SPEC QL NAA+PROBE: NOT DETECTED

## 2020-07-16 ENCOUNTER — TELEPHONE (OUTPATIENT)
Dept: OBSTETRICS AND GYNECOLOGY | Facility: CLINIC | Age: 30
End: 2020-07-16

## 2020-07-16 NOTE — TELEPHONE ENCOUNTER
Spoke with patient notified of cancellation of vaginosis swab. Pt states her problem at that time was a retained tampon. Pt is not having any current symptoms. Advised to f/u if she has any new problems. Patient verbalized understanding

## 2020-07-16 NOTE — TELEPHONE ENCOUNTER
"----- Message from [employer] sent at 7/14/2020 11:51 AM CDT -----  Regarding: Cancellation of Order # [id number]  Order number [id number] for the procedure VAGINOSIS SCREEN BY DNA   PROBE [PEY086] has been canceled by [employer]   [[id number]]. This procedure was ordered by Cata Vera NP   [[id number]] on Jun 26, 2020 for the patient Radha Vanegas   [[id number]]. The reason for cancellation was "Other".    Please notify client that her vaginal culture screen was cancelled by the lab. If vaginal symptoms continues please schedule appt for evaluation.     "

## 2021-05-06 ENCOUNTER — PATIENT MESSAGE (OUTPATIENT)
Dept: RESEARCH | Facility: HOSPITAL | Age: 31
End: 2021-05-06

## (undated) DEVICE — TAPE SILK 3IN

## (undated) DEVICE — DRESSING COVER AQUACEL AG SURG

## (undated) DEVICE — TAPE SURG MEDIPORE 6X72IN

## (undated) DEVICE — PAD ABD 8X10 STERILE